# Patient Record
Sex: FEMALE | Race: WHITE | NOT HISPANIC OR LATINO | Employment: OTHER | URBAN - METROPOLITAN AREA
[De-identification: names, ages, dates, MRNs, and addresses within clinical notes are randomized per-mention and may not be internally consistent; named-entity substitution may affect disease eponyms.]

---

## 2017-01-13 PROBLEM — Z72.0 TOBACCO USE: Status: ACTIVE | Noted: 2017-01-13

## 2017-01-13 PROBLEM — I48.91 A-FIB (HCC): Status: ACTIVE | Noted: 2017-01-13

## 2017-01-13 PROBLEM — K52.9 CHRONIC COLITIS: Status: ACTIVE | Noted: 2017-01-13

## 2017-03-30 ENCOUNTER — OFFICE VISIT (OUTPATIENT)
Dept: CARDIOLOGY | Facility: CLINIC | Age: 67
End: 2017-03-30

## 2017-03-30 VITALS
BODY MASS INDEX: 23.49 KG/M2 | HEIGHT: 65 IN | DIASTOLIC BLOOD PRESSURE: 68 MMHG | WEIGHT: 141 LBS | SYSTOLIC BLOOD PRESSURE: 116 MMHG | HEART RATE: 72 BPM

## 2017-03-30 DIAGNOSIS — I48.0 PAROXYSMAL ATRIAL FIBRILLATION (HCC): ICD-10-CM

## 2017-03-30 DIAGNOSIS — Z72.0 TOBACCO USE: Primary | ICD-10-CM

## 2017-03-30 DIAGNOSIS — F10.90 CHRONIC ALCOHOL USE: ICD-10-CM

## 2017-03-30 PROCEDURE — 99213 OFFICE O/P EST LOW 20 MIN: CPT | Performed by: PHYSICIAN ASSISTANT

## 2017-03-30 PROCEDURE — 93000 ELECTROCARDIOGRAM COMPLETE: CPT | Performed by: PHYSICIAN ASSISTANT

## 2017-03-30 RX ORDER — MELOXICAM 15 MG/1
15 TABLET ORAL DAILY
COMMUNITY
End: 2019-10-11

## 2017-03-30 RX ORDER — ERGOCALCIFEROL (VITAMIN D2) 10 MCG
400 TABLET ORAL DAILY
COMMUNITY
End: 2019-10-11

## 2017-03-30 NOTE — PROGRESS NOTES
"     [facility] - Office Note  Mar Joseph         [address] [address]  1950   [phone] (home) [phone] (work)     LOCATION:  [place] office.  Visit Type: Follow Up.    PCP:  Adair Duarte MD    03/30/17   Mar Joseph is a 66 y.o.  female  Self employed.      Chief Complaint: Follow up on AFib.  PROBLEM LIST:  1. Isolated episode of atrial fibrillation converting spontaneously on beta-blocker therapy, September 2013.  2. Fall with right tibia/fibula fracture, status post tibia IM harish, September 2013.  3. History of chronic alcohol use.  4. History of chronic tobacco use.  5. Chronic colitis.      Allergies   Allergen Reactions   • Penicillins Hives   • Sulfa Antibiotics      nausea         Current Outpatient Prescriptions:   •  atenolol (TENORMIN) 100 MG tablet, Take 100 mg by mouth daily., Disp: , Rfl:   •  bimatoprost (LUMIGAN) 0.01 % ophthalmic drops, 1 drop every night., Disp: , Rfl:   •  escitalopram (LEXAPRO) 10 MG tablet, Take 10 mg by mouth daily., Disp: , Rfl:   •  meloxicam (MOBIC) 15 MG tablet, Take 15 mg by mouth Daily., Disp: , Rfl:   •  Vitamin D, Cholecalciferol, (CHOLECALCIFEROL) 400 UNITS tablet, Take 400 Units by mouth Daily., Disp: , Rfl:   •  vitamin E 100 UNIT capsule, Take 100 Units by mouth Daily., Disp: , Rfl:     HPI  Mar is here for routine follow-up on history of remote atrial fibrillation.  From a cardiac standpoint she is done well since her last visit.  Occasionally she will get a \"flutter\" that will last a few seconds.  She's had no early follow-up or ER visits.  She is currently dealing with a sinus infection and is wanting us to clarify what she can and cannot take over-the-counter.  The following portions of the patient's history were reviewed in the chart and updated as appropriate: allergies, current medications, past family history, past medical history, past social history, past surgical history and " "problem list.    Review of Systems   HENT: Positive for congestion.    Cardiovascular: Positive for palpitations. Negative for chest pain, dyspnea on exertion and irregular heartbeat.   Neurological: Negative.    All other systems reviewed and are negative.            height is 65\" (165.1 cm) and weight is 141 lb (64 kg). Her blood pressure is 116/68 and her pulse is 72.   Physical Exam   Constitutional: Vital signs are normal. She appears well-developed and well-nourished.   Cardiovascular: Normal rate, regular rhythm, S1 normal, S2 normal, normal heart sounds, intact distal pulses and normal pulses.    Pulmonary/Chest: Effort normal and breath sounds normal. She has no wheezes. She has no rhonchi. She has no rales.   Abdominal: Soft. Normal appearance and bowel sounds are normal. There is no hepatosplenomegaly.   Neurological: She is alert.           ECG 12 Lead  Date/Time: 3/30/2017 2:03 PM  Performed by: NURIA BUCIO  Authorized by: NURIA BUCIO   Previous ECG: no previous ECG available  Rhythm: sinus rhythm  Rate: normal  QRS axis: normal  Clinical impression: normal ECG             Assessment/ Plan     Tobacco use:  Patient not interested in smoking cessation at this time.    Chronic alcohol use:  Per PCP.    Paroxysmal atrial fibrillation:  No documented recurrences.  EKG today reviewed and within normal limits.  Return to clinic one year or sooner when necessary.    Nuria Bucio PA-C  3/30/2017 2:02 PM      EMR Dragon/Transcription disclaimer:   Much of this encounter note is an electronic transcription/translation of spoken language to printed text. The electronic translation of spoken language may permit erroneous, or at times, nonsensical words or phrases to be inadvertently transcribed; Although I have reviewed the note for such errors, some may still exist.      "

## 2019-10-09 ENCOUNTER — LAB (OUTPATIENT)
Dept: LAB | Facility: HOSPITAL | Age: 69
End: 2019-10-09

## 2019-10-09 ENCOUNTER — OFFICE VISIT (OUTPATIENT)
Dept: CARDIOLOGY | Facility: HOSPITAL | Age: 69
End: 2019-10-09

## 2019-10-09 ENCOUNTER — HOSPITAL ENCOUNTER (OUTPATIENT)
Dept: CARDIOLOGY | Facility: HOSPITAL | Age: 69
Discharge: HOME | End: 2019-10-09
Admitting: NURSE PRACTITIONER

## 2019-10-09 VITALS
HEART RATE: 83 BPM | HEIGHT: 64 IN | BODY MASS INDEX: 22.9 KG/M2 | WEIGHT: 134.13 LBS | RESPIRATION RATE: 18 BRPM | SYSTOLIC BLOOD PRESSURE: 135 MMHG | TEMPERATURE: 97 F | DIASTOLIC BLOOD PRESSURE: 69 MMHG | OXYGEN SATURATION: 99 %

## 2019-10-09 DIAGNOSIS — I48.91 ATRIAL FIBRILLATION, UNSPECIFIED TYPE (HCC): ICD-10-CM

## 2019-10-09 DIAGNOSIS — I48.19 OTHER PERSISTENT ATRIAL FIBRILLATION (HCC): Primary | ICD-10-CM

## 2019-10-09 DIAGNOSIS — I48.19 OTHER PERSISTENT ATRIAL FIBRILLATION (HCC): ICD-10-CM

## 2019-10-09 LAB
ALBUMIN SERPL-MCNC: 4.6 G/DL (ref 3.5–5.2)
ALBUMIN/GLOB SERPL: 1.5 G/DL
ALP SERPL-CCNC: 57 U/L (ref 39–117)
ALT SERPL W P-5'-P-CCNC: 11 U/L (ref 1–33)
ANION GAP SERPL CALCULATED.3IONS-SCNC: 11 MMOL/L (ref 5–15)
AST SERPL-CCNC: 17 U/L (ref 1–32)
BASOPHILS # BLD AUTO: 0.05 10*3/MM3 (ref 0–0.2)
BASOPHILS NFR BLD AUTO: 0.6 % (ref 0–1.5)
BILIRUB SERPL-MCNC: 0.4 MG/DL (ref 0.2–1.2)
BUN BLD-MCNC: 13 MG/DL (ref 8–23)
BUN/CREAT SERPL: 26 (ref 7–25)
CALCIUM SPEC-SCNC: 10 MG/DL (ref 8.6–10.5)
CHLORIDE SERPL-SCNC: 98 MMOL/L (ref 98–107)
CO2 SERPL-SCNC: 27 MMOL/L (ref 22–29)
CREAT BLD-MCNC: 0.5 MG/DL (ref 0.57–1)
DEPRECATED RDW RBC AUTO: 43.7 FL (ref 37–54)
EOSINOPHIL # BLD AUTO: 0.1 10*3/MM3 (ref 0–0.4)
EOSINOPHIL NFR BLD AUTO: 1.1 % (ref 0.3–6.2)
ERYTHROCYTE [DISTWIDTH] IN BLOOD BY AUTOMATED COUNT: 12 % (ref 12.3–15.4)
GFR SERPL CREATININE-BSD FRML MDRD: 123 ML/MIN/1.73
GLOBULIN UR ELPH-MCNC: 3 GM/DL
GLUCOSE BLD-MCNC: 105 MG/DL (ref 65–99)
HCT VFR BLD AUTO: 39.7 % (ref 34–46.6)
HGB BLD-MCNC: 13.5 G/DL (ref 12–15.9)
IMM GRANULOCYTES # BLD AUTO: 0.06 10*3/MM3 (ref 0–0.05)
IMM GRANULOCYTES NFR BLD AUTO: 0.7 % (ref 0–0.5)
LYMPHOCYTES # BLD AUTO: 1.51 10*3/MM3 (ref 0.7–3.1)
LYMPHOCYTES NFR BLD AUTO: 16.6 % (ref 19.6–45.3)
MAGNESIUM SERPL-MCNC: 2.1 MG/DL (ref 1.6–2.4)
MCH RBC QN AUTO: 33.5 PG (ref 26.6–33)
MCHC RBC AUTO-ENTMCNC: 34 G/DL (ref 31.5–35.7)
MCV RBC AUTO: 98.5 FL (ref 79–97)
MONOCYTES # BLD AUTO: 0.82 10*3/MM3 (ref 0.1–0.9)
MONOCYTES NFR BLD AUTO: 9 % (ref 5–12)
NEUTROPHILS # BLD AUTO: 6.54 10*3/MM3 (ref 1.7–7)
NEUTROPHILS NFR BLD AUTO: 72 % (ref 42.7–76)
NRBC BLD AUTO-RTO: 0 /100 WBC (ref 0–0.2)
PLATELET # BLD AUTO: 259 10*3/MM3 (ref 140–450)
PMV BLD AUTO: 12.4 FL (ref 6–12)
POTASSIUM BLD-SCNC: 4.3 MMOL/L (ref 3.5–5.2)
PROT SERPL-MCNC: 7.6 G/DL (ref 6–8.5)
RBC # BLD AUTO: 4.03 10*6/MM3 (ref 3.77–5.28)
SODIUM BLD-SCNC: 136 MMOL/L (ref 136–145)
T-UPTAKE NFR SERPL: 0.99 TBI (ref 0.8–1.3)
T4 SERPL-MCNC: 5.75 MCG/DL (ref 4.5–11.7)
TSH SERPL DL<=0.05 MIU/L-ACNC: 1.55 UIU/ML (ref 0.27–4.2)
WBC NRBC COR # BLD: 9.08 10*3/MM3 (ref 3.4–10.8)

## 2019-10-09 PROCEDURE — 85025 COMPLETE CBC W/AUTO DIFF WBC: CPT

## 2019-10-09 PROCEDURE — 36415 COLL VENOUS BLD VENIPUNCTURE: CPT

## 2019-10-09 PROCEDURE — 84436 ASSAY OF TOTAL THYROXINE: CPT

## 2019-10-09 PROCEDURE — 99214 OFFICE O/P EST MOD 30 MIN: CPT | Performed by: NURSE PRACTITIONER

## 2019-10-09 PROCEDURE — 80053 COMPREHEN METABOLIC PANEL: CPT

## 2019-10-09 PROCEDURE — 83735 ASSAY OF MAGNESIUM: CPT

## 2019-10-09 PROCEDURE — 84443 ASSAY THYROID STIM HORMONE: CPT

## 2019-10-09 PROCEDURE — 84479 ASSAY OF THYROID (T3 OR T4): CPT

## 2019-10-09 RX ORDER — TRAMADOL HYDROCHLORIDE 50 MG/1
1 TABLET ORAL EVERY 12 HOURS SCHEDULED
COMMUNITY
End: 2019-11-25

## 2019-10-09 RX ORDER — APIXABAN 5 MG/1
1 TABLET, FILM COATED ORAL 2 TIMES DAILY
Refills: 0 | Status: ON HOLD | COMMUNITY
Start: 2019-10-08 | End: 2019-10-16 | Stop reason: SDUPTHER

## 2019-10-09 RX ORDER — IBUPROFEN 200 MG
1 TABLET ORAL EVERY 6 HOURS
COMMUNITY
End: 2019-10-11

## 2019-10-09 NOTE — PROGRESS NOTES
[facility]  [facility]      Encounter Date:10/09/2019     Mar Joseph  [address] [address]  [unfilled]    1950    Adair Duarte MD    Mar Joseph is a 68 y.o. female.      Subjective:     Chief Complaint:  Atrial Fibrillation       HPI   Patient is a 68-year-old female with past medical history significant for isolated episode of atrial fibrillation in 2013 who presents to the [facility] as an add-on visit for atrial fibrillation.  2 weeks ago she started having significant fatigue, which was unusual for her.  She normally goes to the gym on a daily basis and reports on Friday she did her routine aerobic exercise and her heart rate Jumping up to 135 according to her apple watch.  No shortness of breath, lightheadedness or presyncope, chest pain.  She went to urgent treatment center and was started on Eliquis and her atenolol was increased from 25 mg daily to 50 mg twice a day.  She has had some improvement in her symptoms but still has some fatigue.  She continues to exercise regularly without bothersome symptoms.  She has no history of CAD, hypertension, DM or CHF.  No history of sleep apnea or sleep disturbances.  She does drink 2 light beers a night.  She recently changed her 1 cup of coffee a day to decaf.    Patient Active Problem List   Diagnosis   • A-fib (CMS/HCC)   • Chronic alcohol use   • Tobacco use   • Chronic colitis   • Palpitation       Past Medical History:   Diagnosis Date   • A-fib (CMS/HCC)    • Chronic alcohol use    • Chronic colitis    • Palpitation     Benign palpitations   • Tibia/fibula fracture     Fall with right tibia/fibula fracture, status post tibia IM harish, September 2013.   • Tobacco use        History reviewed. No pertinent surgical history.    Family History   Problem Relation Age of Onset   • Asthma Mother    • No Known Problems Father    • Heart disease Sister    • Cancer Brother    • No Known Problems  Maternal Grandmother    • No Known Problems Maternal Grandfather    • No Known Problems Paternal Grandmother    • No Known Problems Paternal Grandfather    • No Known Problems Brother        Social History     Socioeconomic History   • Marital status:      Spouse name: Not on file   • Number of children: Not on file   • Years of education: Not on file   • Highest education level: Not on file   Tobacco Use   • Smoking status: Current Every Day Smoker     Packs/day: 1.00     Types: Cigarettes   • Smokeless tobacco: Never Used   Substance and Sexual Activity   • Alcohol use: Yes     Frequency: Monthly or less     Drinks per session: 1 or 2     Comment: occasional   • Drug use: No   • Sexual activity: Defer   Social History Narrative    Caffeine: 1 decaf recently       Allergies   Allergen Reactions   • Penicillins Hives   • Sulfa Antibiotics      nausea         Current Outpatient Medications:   •  atenolol (TENORMIN) 25 MG tablet, Take 50 mg by mouth 2 (Two) Times a Day., Disp: , Rfl:   •  bimatoprost (LUMIGAN) 0.01 % ophthalmic drops, 1 drop every night., Disp: , Rfl:   •  ELIQUIS 5 MG tablet tablet, Take 1 tablet by mouth 2 (Two) Times a Day., Disp: , Rfl: 0  •  escitalopram (LEXAPRO) 10 MG tablet, Take 10 mg by mouth daily., Disp: , Rfl:   •  ibuprofen (ADVIL,MOTRIN) 200 MG tablet, Take 1 tablet by mouth Every 6 (Six) Hours., Disp: , Rfl:   •  meloxicam (MOBIC) 15 MG tablet, Take 15 mg by mouth Daily., Disp: , Rfl:   •  traMADol (ULTRAM) 50 MG tablet, Take 1 tablet by mouth Every 12 (Twelve) Hours., Disp: , Rfl:   •  Vitamin D, Cholecalciferol, (CHOLECALCIFEROL) 400 UNITS tablet, Take 400 Units by mouth Daily., Disp: , Rfl:   •  vitamin E 100 UNIT capsule, Take 100 Units by mouth Daily., Disp: , Rfl:     The following portions of the patient's history were reviewed today and updated as appropriate: allergies, current medications, past family history, past medical history, past social history, past surgical  "history and problem list     Review of Systems   Constitution: Positive for malaise/fatigue. Negative for chills and fever.   HENT: Negative.    Eyes: Negative.    Cardiovascular: Positive for irregular heartbeat. Negative for chest pain, claudication, cyanosis, dyspnea on exertion, leg swelling, near-syncope, orthopnea, palpitations, paroxysmal nocturnal dyspnea and syncope.   Respiratory: Negative for cough, shortness of breath and snoring.    Endocrine: Negative.    Hematologic/Lymphatic: Does not bruise/bleed easily.   Skin: Negative for poor wound healing.   Musculoskeletal: Positive for back pain.   Gastrointestinal: Negative for abdominal pain, heartburn, hematemesis, melena, nausea and vomiting.   Genitourinary: Negative.  Negative for hematuria.   Neurological: Negative.    Psychiatric/Behavioral: Negative.    Allergic/Immunologic: Negative.        Objective:     Vitals:    10/09/19 1242   BP: 135/69   BP Location: Right arm   Patient Position: Sitting   Cuff Size: Adult   Pulse: 83   Resp: 18   Temp: 97 °F (36.1 °C)   TempSrc: Temporal   SpO2: 99%   Weight: 60.8 kg (134 lb 2 oz)   Height: 162.6 cm (64\")       Physical Exam   Constitutional: She is oriented to person, place, and time. She appears well-developed and well-nourished. No distress.   HENT:   Head: Normocephalic.   Eyes: Conjunctivae are normal. Pupils are equal, round, and reactive to light.   Neck: Neck supple. No JVD present. No thyromegaly present.   Cardiovascular: Normal rate, normal heart sounds and intact distal pulses. An irregularly irregular rhythm present. Exam reveals no gallop and no friction rub.   No murmur heard.  Pulmonary/Chest: Effort normal and breath sounds normal. No respiratory distress. She has no wheezes. She has no rales. She exhibits no tenderness.   Abdominal: Soft. Bowel sounds are normal.   Musculoskeletal: Normal range of motion. She exhibits no edema.   Neurological: She is alert and oriented to person, place, and " time.   Skin: Skin is warm and dry.   Psychiatric: She has a normal mood and affect. Her behavior is normal. Thought content normal.   Vitals reviewed.      Lab and Diagnostic Review:    EKG 10/8/2019 showed atrial fibrillation with normal ventricular response, heart rate 97, QRS 90, QTc 434 ms    Assessment and Plan:   1. Other persistent atrial fibrillation  Atrial fibrillation education provided today including: causes of afib, s/s, risks for stroke and use of anticoagulation for stroke prevention and treatment of atrial fibrillation. Rhythm vs rate control discussed as well as medication management.  CHADS-VASc Risk Assessment            2       Total Score        1 Age 65-74    1 Sex: Female        Criteria that do not apply:    CHF    Hypertension    Age >/= 75    DM    PRIOR STROKE/TIA/THROMBO    Vascular Disease        Continue eliquis 5mg PO BID  Avoid NSAIDs  Decrease ETOH, preferably avoid  Keep appt with Dr. Lafleur. Will need TTE if YOLANDA/ECV not done. At this time she is asymptomatic so no need for urgent ECV    - Thyroid Panel With TSH; Future  - Comprehensive Metabolic Panel; Future  - CBC & Differential; Future  - Magnesium; Future    RV PRN    It has been a pleasure to participate in the care of this patient.  Patient was instructed to call the [facility] with any questions, concerns, or worsening symptoms.    *Please note that portions of this note were completed with a voice recognition program. Efforts were made to edit the dictations, but occasionally words are mistranscribed.

## 2019-10-11 ENCOUNTER — PREP FOR SURGERY (OUTPATIENT)
Dept: OTHER | Facility: HOSPITAL | Age: 69
End: 2019-10-11

## 2019-10-11 ENCOUNTER — OFFICE VISIT (OUTPATIENT)
Dept: CARDIOLOGY | Facility: CLINIC | Age: 69
End: 2019-10-11

## 2019-10-11 VITALS
OXYGEN SATURATION: 99 % | SYSTOLIC BLOOD PRESSURE: 110 MMHG | HEART RATE: 73 BPM | WEIGHT: 132 LBS | BODY MASS INDEX: 22.53 KG/M2 | DIASTOLIC BLOOD PRESSURE: 60 MMHG | HEIGHT: 64 IN

## 2019-10-11 DIAGNOSIS — Z72.0 TOBACCO USE: ICD-10-CM

## 2019-10-11 DIAGNOSIS — I48.0 PAROXYSMAL ATRIAL FIBRILLATION (HCC): Primary | ICD-10-CM

## 2019-10-11 DIAGNOSIS — E78.2 MIXED HYPERLIPIDEMIA: ICD-10-CM

## 2019-10-11 PROBLEM — E78.1 PURE HYPERTRIGLYCERIDEMIA: Status: ACTIVE | Noted: 2019-10-11

## 2019-10-11 PROCEDURE — 99214 OFFICE O/P EST MOD 30 MIN: CPT | Performed by: INTERNAL MEDICINE

## 2019-10-11 PROCEDURE — 93000 ELECTROCARDIOGRAM COMPLETE: CPT | Performed by: INTERNAL MEDICINE

## 2019-10-11 RX ORDER — SODIUM CHLORIDE 0.9 % (FLUSH) 0.9 %
10 SYRINGE (ML) INJECTION AS NEEDED
Status: CANCELLED | OUTPATIENT
Start: 2019-10-11

## 2019-10-11 RX ORDER — ONDANSETRON 2 MG/ML
4 INJECTION INTRAMUSCULAR; INTRAVENOUS EVERY 6 HOURS PRN
Status: CANCELLED | OUTPATIENT
Start: 2019-10-11

## 2019-10-11 RX ORDER — SODIUM CHLORIDE 0.9 % (FLUSH) 0.9 %
3 SYRINGE (ML) INJECTION EVERY 12 HOURS SCHEDULED
Status: CANCELLED | OUTPATIENT
Start: 2019-10-11

## 2019-10-11 RX ORDER — NITROGLYCERIN 0.4 MG/1
0.4 TABLET SUBLINGUAL
Status: CANCELLED | OUTPATIENT
Start: 2019-10-11

## 2019-10-11 NOTE — PROGRESS NOTES
Subjective   Mar Joseph is a 68 y.o. female.  Primary Care: Adair Duarte MD  Referring: Adair Duarte MD  [address] [place] [address]      Chief Complaint   Patient presents with   • Atrial Fibrillation       Patient Active Problem List    Diagnosis Date Noted   • Atrial fibrillation (CMS/HCC) 01/13/2017     1. Isolated episode of atrial fibrillation converting spontaneously on beta-blocker therapy, September 2013.  2. Recurrent persistent atrial fibrillation.  3. CHADVASC 2   • Palpitation    • Mixed hyperlipidemia    • Tobacco use    • Chronic colitis 01/13/2017      History of Present Illness   This is a 68-year-old dyslipidemic female smoker with a prior history of paroxysmal atrial fibrillation with no known recurrence since 2013.  She recently presented to primary care with a 2 to 3-week complaint of awareness of tachypalpitations.  She checks her rhythm on her iWatch which shows atrial fibrillation.  She does not check her blood pressure regularly.  She denies associated chest pain dizziness or syncope.  She continues to exercise regularly 1-1/2 hours 2 times per week.  She is originally presented to an urgent treatment center where she was prescribed Eliquis 5 mg twice daily.  She is concerned about her cup coming spinal steroid injection.  Her thyroid function is within normal limits.  She denies orthopnea, PND, claudication, lower extremity edema.  She denies dizziness or syncope.    History reviewed. No pertinent surgical history.    The following portions of the patient's history were reviewed and updated as appropriate: allergies, current medications, past family history, past medical history, past social history, past surgical history and problem list.    Allergies   Allergen Reactions   • Penicillins Hives   • Sulfa Antibiotics      nausea         Current Outpatient Medications:   •  atenolol (TENORMIN) 100 MG tablet, Take 50 mg by mouth 2 (Two) Times a Day., Disp: , Rfl:   •   bimatoprost (LUMIGAN) 0.01 % ophthalmic drops, 1 drop every night., Disp: , Rfl:   •  ELIQUIS 5 MG tablet tablet, Take 1 tablet by mouth 2 (Two) Times a Day., Disp: , Rfl: 0  •  escitalopram (LEXAPRO) 10 MG tablet, Take 10 mg by mouth daily., Disp: , Rfl:   •  traMADol (ULTRAM) 50 MG tablet, Take 1 tablet by mouth Every 12 (Twelve) Hours., Disp: , Rfl:     Review of Systems   Constitution: Positive for malaise/fatigue.   HENT: Negative.    Eyes: Positive for vision loss in left eye and vision loss in right eye.   Cardiovascular: Positive for palpitations.   Respiratory: Negative.    Endocrine: Negative.    Hematologic/Lymphatic: Negative.    Skin: Negative.    Musculoskeletal: Positive for arthritis.   Gastrointestinal: Negative.    Genitourinary: Negative.    Neurological: Negative.    Psychiatric/Behavioral: Negative.    Allergic/Immunologic: Negative.    All other systems reviewed and are negative.      Social History     Socioeconomic History   • Marital status:      Spouse name: Not on file   • Number of children: Not on file   • Years of education: Not on file   • Highest education level: Not on file   Tobacco Use   • Smoking status: Current Every Day Smoker     Packs/day: 0.75     Types: Cigarettes   • Smokeless tobacco: Never Used   Substance and Sexual Activity   • Alcohol use: Yes     Frequency: Monthly or less     Drinks per session: 1 or 2     Comment: occasional   • Drug use: No   • Sexual activity: Defer   Social History Narrative    Caffeine: 1 decaf recently       Family History   Problem Relation Age of Onset   • Asthma Mother    • No Known Problems Father    • Heart disease Sister    • Cancer Brother    • No Known Problems Maternal Grandmother    • No Known Problems Maternal Grandfather    • No Known Problems Paternal Grandmother    • No Known Problems Paternal Grandfather    • No Known Problems Brother        Objective      /60 (BP Location: Left arm, Patient Position: Sitting)    "Pulse 73   Ht 162.6 cm (64\")   Wt 59.9 kg (132 lb)   SpO2 99%   BMI 22.66 kg/m²     Physical Exam   Constitutional: She is oriented to person, place, and time. She appears well-developed and well-nourished. No distress.   HENT:   Head: Normocephalic and atraumatic.   Mouth/Throat: Oropharynx is clear and moist.   Eyes: Pupils are equal, round, and reactive to light. No scleral icterus.   Neck: Neck supple. No JVD present. No tracheal deviation present. No thyromegaly present.   Cardiovascular: Normal rate and normal heart sounds. An irregular rhythm present. Exam reveals no gallop and no friction rub.   No murmur heard.  Pulmonary/Chest: Effort normal and breath sounds normal. No respiratory distress. She has no wheezes. She has no rales.   Abdominal: Soft. Bowel sounds are normal. She exhibits no distension and no mass. There is no tenderness. There is no rebound and no guarding.   Musculoskeletal: Normal range of motion. She exhibits no edema or deformity.   Lymphadenopathy:     She has no cervical adenopathy.   Neurological: She is alert and oriented to person, place, and time. No cranial nerve deficit.   Skin: Skin is warm and dry. No rash noted. She is not diaphoretic.   Psychiatric: She has a normal mood and affect.   Nursing note and vitals reviewed.        ECG 12 Lead  Date/Time: 10/11/2019 8:26 AM  Performed by: Marcelo Lafleur MD  Authorized by: Marcelo Lafleur MD   Previous ECG: no previous ECG available  Rhythm: sinus rhythm and atrial fibrillation  Rate: normal  BPM: 78  Conduction: conduction normal  ST Segments: ST segments normal  T Waves: T waves normal  QRS axis: normal  Other: no other findings    Clinical impression: abnormal EKG                Lab Review:   Lab Results   Component Value Date    GLUCOSE 105 (H) 10/09/2019    BUN 13 10/09/2019    CREATININE 0.50 (L) 10/09/2019    EGFRIFNONA 123 10/09/2019    BCR 26.0 (H) 10/09/2019    CO2 27.0 10/09/2019    CALCIUM 10.0 10/09/2019    ALBUMIN 4.60 " 10/09/2019    AST 17 10/09/2019    ALT 11 10/09/2019       Lab Results   Component Value Date    WBC 9.08 10/09/2019    HGB 13.5 10/09/2019    HCT 39.7 10/09/2019    MCV 98.5 (H) 10/09/2019     10/09/2019           Lab Results   Component Value Date    TSH 1.550 10/09/2019         CHADS-VASc Risk Assessment            2       Total Score        1 Age 65-74    1 Sex: Female        Criteria that do not apply:    CHF    Hypertension    Age >/= 75    DM    PRIOR STROKE/TIA/THROMBO    Vascular Disease        Assessment:   Diagnosis Plan   1.  Persistent atrial fibrillation (CMS/HCC), ongoing awareness of abnormal rhythm despite controlled rate. ECG 12 Lead    Cardioversion External in Cardiology Department    Adult Transesophageal Echo (YOLANDA) W/ Cont if Necessary Per Protocol   2. Mixed hyperlipidemia   defer to primary care   3. Tobacco use   smoking cessation recommended     Plan:  Continue current medications.  Transesophageal echocardiogram followed by ECV if appropriate.  Subsequently we will consider changing her to sotalol.  Follow-up after the procedure.  Thank you for allowing us to participate in the care of your patient.     Scribed for Marcelo Lafleur MD by Electronically signed by Electronically signed by RONAK Walsh, 10/11/19, 10:55 AM.    I, Marcelo Lafleur MD, personally performed the services described in this documentation as scribed by the above named individual in my presence, and it is both accurate and complete.  10/11/2019  11:16 AM

## 2019-10-11 NOTE — H&P (VIEW-ONLY)
Subjective   Mar Joseph is a 68 y.o. female.  Primary Care: Adair Duarte MD  Referring: Adair Duarte MD  [address] [place] [address]      Chief Complaint   Patient presents with   • Atrial Fibrillation       Patient Active Problem List    Diagnosis Date Noted   • Atrial fibrillation (CMS/HCC) 01/13/2017     1. Isolated episode of atrial fibrillation converting spontaneously on beta-blocker therapy, September 2013.  2. Recurrent persistent atrial fibrillation.  3. CHADVASC 2   • Palpitation    • Mixed hyperlipidemia    • Tobacco use    • Chronic colitis 01/13/2017      History of Present Illness   This is a 68-year-old dyslipidemic female smoker with a prior history of paroxysmal atrial fibrillation with no known recurrence since 2013.  She recently presented to primary care with a 2 to 3-week complaint of awareness of tachypalpitations.  She checks her rhythm on her iWatch which shows atrial fibrillation.  She does not check her blood pressure regularly.  She denies associated chest pain dizziness or syncope.  She continues to exercise regularly 1-1/2 hours 2 times per week.  She is originally presented to an urgent treatment center where she was prescribed Eliquis 5 mg twice daily.  She is concerned about her cup coming spinal steroid injection.  Her thyroid function is within normal limits.  She denies orthopnea, PND, claudication, lower extremity edema.  She denies dizziness or syncope.    History reviewed. No pertinent surgical history.    The following portions of the patient's history were reviewed and updated as appropriate: allergies, current medications, past family history, past medical history, past social history, past surgical history and problem list.    Allergies   Allergen Reactions   • Penicillins Hives   • Sulfa Antibiotics      nausea         Current Outpatient Medications:   •  atenolol (TENORMIN) 100 MG tablet, Take 50 mg by mouth 2 (Two) Times a Day., Disp: , Rfl:   •   bimatoprost (LUMIGAN) 0.01 % ophthalmic drops, 1 drop every night., Disp: , Rfl:   •  ELIQUIS 5 MG tablet tablet, Take 1 tablet by mouth 2 (Two) Times a Day., Disp: , Rfl: 0  •  escitalopram (LEXAPRO) 10 MG tablet, Take 10 mg by mouth daily., Disp: , Rfl:   •  traMADol (ULTRAM) 50 MG tablet, Take 1 tablet by mouth Every 12 (Twelve) Hours., Disp: , Rfl:     Review of Systems   Constitution: Positive for malaise/fatigue.   HENT: Negative.    Eyes: Positive for vision loss in left eye and vision loss in right eye.   Cardiovascular: Positive for palpitations.   Respiratory: Negative.    Endocrine: Negative.    Hematologic/Lymphatic: Negative.    Skin: Negative.    Musculoskeletal: Positive for arthritis.   Gastrointestinal: Negative.    Genitourinary: Negative.    Neurological: Negative.    Psychiatric/Behavioral: Negative.    Allergic/Immunologic: Negative.    All other systems reviewed and are negative.      Social History     Socioeconomic History   • Marital status:      Spouse name: Not on file   • Number of children: Not on file   • Years of education: Not on file   • Highest education level: Not on file   Tobacco Use   • Smoking status: Current Every Day Smoker     Packs/day: 0.75     Types: Cigarettes   • Smokeless tobacco: Never Used   Substance and Sexual Activity   • Alcohol use: Yes     Frequency: Monthly or less     Drinks per session: 1 or 2     Comment: occasional   • Drug use: No   • Sexual activity: Defer   Social History Narrative    Caffeine: 1 decaf recently       Family History   Problem Relation Age of Onset   • Asthma Mother    • No Known Problems Father    • Heart disease Sister    • Cancer Brother    • No Known Problems Maternal Grandmother    • No Known Problems Maternal Grandfather    • No Known Problems Paternal Grandmother    • No Known Problems Paternal Grandfather    • No Known Problems Brother        Objective      /60 (BP Location: Left arm, Patient Position: Sitting)    "Pulse 73   Ht 162.6 cm (64\")   Wt 59.9 kg (132 lb)   SpO2 99%   BMI 22.66 kg/m²     Physical Exam   Constitutional: She is oriented to person, place, and time. She appears well-developed and well-nourished. No distress.   HENT:   Head: Normocephalic and atraumatic.   Mouth/Throat: Oropharynx is clear and moist.   Eyes: Pupils are equal, round, and reactive to light. No scleral icterus.   Neck: Neck supple. No JVD present. No tracheal deviation present. No thyromegaly present.   Cardiovascular: Normal rate and normal heart sounds. An irregular rhythm present. Exam reveals no gallop and no friction rub.   No murmur heard.  Pulmonary/Chest: Effort normal and breath sounds normal. No respiratory distress. She has no wheezes. She has no rales.   Abdominal: Soft. Bowel sounds are normal. She exhibits no distension and no mass. There is no tenderness. There is no rebound and no guarding.   Musculoskeletal: Normal range of motion. She exhibits no edema or deformity.   Lymphadenopathy:     She has no cervical adenopathy.   Neurological: She is alert and oriented to person, place, and time. No cranial nerve deficit.   Skin: Skin is warm and dry. No rash noted. She is not diaphoretic.   Psychiatric: She has a normal mood and affect.   Nursing note and vitals reviewed.        ECG 12 Lead  Date/Time: 10/11/2019 8:26 AM  Performed by: Marcelo Lafleur MD  Authorized by: Marcelo Lafleur MD   Previous ECG: no previous ECG available  Rhythm: sinus rhythm and atrial fibrillation  Rate: normal  BPM: 78  Conduction: conduction normal  ST Segments: ST segments normal  T Waves: T waves normal  QRS axis: normal  Other: no other findings    Clinical impression: abnormal EKG                Lab Review:   Lab Results   Component Value Date    GLUCOSE 105 (H) 10/09/2019    BUN 13 10/09/2019    CREATININE 0.50 (L) 10/09/2019    EGFRIFNONA 123 10/09/2019    BCR 26.0 (H) 10/09/2019    CO2 27.0 10/09/2019    CALCIUM 10.0 10/09/2019    ALBUMIN 4.60 " 10/09/2019    AST 17 10/09/2019    ALT 11 10/09/2019       Lab Results   Component Value Date    WBC 9.08 10/09/2019    HGB 13.5 10/09/2019    HCT 39.7 10/09/2019    MCV 98.5 (H) 10/09/2019     10/09/2019           Lab Results   Component Value Date    TSH 1.550 10/09/2019         CHADS-VASc Risk Assessment            2       Total Score        1 Age 65-74    1 Sex: Female        Criteria that do not apply:    CHF    Hypertension    Age >/= 75    DM    PRIOR STROKE/TIA/THROMBO    Vascular Disease        Assessment:   Diagnosis Plan   1.  Persistent atrial fibrillation (CMS/HCC), ongoing awareness of abnormal rhythm despite controlled rate. ECG 12 Lead    Cardioversion External in Cardiology Department    Adult Transesophageal Echo (YOLANDA) W/ Cont if Necessary Per Protocol   2. Mixed hyperlipidemia   defer to primary care   3. Tobacco use   smoking cessation recommended     Plan:  Continue current medications.  Transesophageal echocardiogram followed by ECV if appropriate.  Subsequently we will consider changing her to sotalol.  Follow-up after the procedure.  Thank you for allowing us to participate in the care of your patient.     Scribed for Marcelo Lafleur MD by Electronically signed by Electronically signed by RONAK Walsh, 10/11/19, 10:55 AM.    I, Marcelo Lafleur MD, personally performed the services described in this documentation as scribed by the above named individual in my presence, and it is both accurate and complete.  10/11/2019  11:16 AM

## 2019-10-16 ENCOUNTER — HOSPITAL ENCOUNTER (OUTPATIENT)
Dept: CARDIOLOGY | Facility: HOSPITAL | Age: 69
Discharge: HOME | End: 2019-10-16
Attending: INTERNAL MEDICINE | Admitting: INTERNAL MEDICINE

## 2019-10-16 VITALS
HEIGHT: 64 IN | TEMPERATURE: 97.3 F | OXYGEN SATURATION: 95 % | RESPIRATION RATE: 16 BRPM | SYSTOLIC BLOOD PRESSURE: 140 MMHG | BODY MASS INDEX: 22.36 KG/M2 | DIASTOLIC BLOOD PRESSURE: 82 MMHG | HEART RATE: 65 BPM | WEIGHT: 131 LBS

## 2019-10-16 DIAGNOSIS — I48.0 PAROXYSMAL ATRIAL FIBRILLATION (HCC): ICD-10-CM

## 2019-10-16 LAB
ANION GAP SERPL CALCULATED.3IONS-SCNC: 12 MMOL/L (ref 5–15)
BH CV VAS BP LEFT ARM: NORMAL MMHG
BUN BLD-MCNC: 8 MG/DL (ref 8–23)
BUN/CREAT SERPL: 18.6 (ref 7–25)
CALCIUM SPEC-SCNC: 9.5 MG/DL (ref 8.6–10.5)
CHLORIDE SERPL-SCNC: 102 MMOL/L (ref 98–107)
CO2 SERPL-SCNC: 24 MMOL/L (ref 22–29)
CREAT BLD-MCNC: 0.43 MG/DL (ref 0.57–1)
DEPRECATED RDW RBC AUTO: 44.1 FL (ref 37–54)
ERYTHROCYTE [DISTWIDTH] IN BLOOD BY AUTOMATED COUNT: 12.1 % (ref 12.3–15.4)
GFR SERPL CREATININE-BSD FRML MDRD: 146 ML/MIN/1.73
GLUCOSE BLD-MCNC: 92 MG/DL (ref 65–99)
HCT VFR BLD AUTO: 37.4 % (ref 34–46.6)
HGB BLD-MCNC: 12.5 G/DL (ref 12–15.9)
LV EF 2D ECHO EST: 60 %
MAGNESIUM SERPL-MCNC: 1.9 MG/DL (ref 1.6–2.4)
MCH RBC QN AUTO: 32.8 PG (ref 26.6–33)
MCHC RBC AUTO-ENTMCNC: 33.4 G/DL (ref 31.5–35.7)
MCV RBC AUTO: 98.2 FL (ref 79–97)
PLATELET # BLD AUTO: 225 10*3/MM3 (ref 140–450)
PMV BLD AUTO: 10.6 FL (ref 6–12)
POTASSIUM BLD-SCNC: 4.5 MMOL/L (ref 3.5–5.2)
RBC # BLD AUTO: 3.81 10*6/MM3 (ref 3.77–5.28)
SODIUM BLD-SCNC: 138 MMOL/L (ref 136–145)
WBC NRBC COR # BLD: 6.85 10*3/MM3 (ref 3.4–10.8)

## 2019-10-16 PROCEDURE — 36415 COLL VENOUS BLD VENIPUNCTURE: CPT

## 2019-10-16 PROCEDURE — 93321 DOPPLER ECHO F-UP/LMTD STD: CPT | Performed by: INTERNAL MEDICINE

## 2019-10-16 PROCEDURE — 93312 ECHO TRANSESOPHAGEAL: CPT | Performed by: INTERNAL MEDICINE

## 2019-10-16 PROCEDURE — 25010000002 MIDAZOLAM PER 1 MG: Performed by: INTERNAL MEDICINE

## 2019-10-16 PROCEDURE — 93005 ELECTROCARDIOGRAM TRACING: CPT | Performed by: INTERNAL MEDICINE

## 2019-10-16 PROCEDURE — 92960 CARDIOVERSION ELECTRIC EXT: CPT | Performed by: INTERNAL MEDICINE

## 2019-10-16 PROCEDURE — 92960 CARDIOVERSION ELECTRIC EXT: CPT

## 2019-10-16 PROCEDURE — 85027 COMPLETE CBC AUTOMATED: CPT

## 2019-10-16 PROCEDURE — 80048 BASIC METABOLIC PNL TOTAL CA: CPT

## 2019-10-16 PROCEDURE — 93321 DOPPLER ECHO F-UP/LMTD STD: CPT

## 2019-10-16 PROCEDURE — 83735 ASSAY OF MAGNESIUM: CPT

## 2019-10-16 PROCEDURE — 93325 DOPPLER ECHO COLOR FLOW MAPG: CPT

## 2019-10-16 PROCEDURE — 93312 ECHO TRANSESOPHAGEAL: CPT

## 2019-10-16 PROCEDURE — 93325 DOPPLER ECHO COLOR FLOW MAPG: CPT | Performed by: INTERNAL MEDICINE

## 2019-10-16 RX ORDER — FLUMAZENIL 0.1 MG/ML
INJECTION INTRAVENOUS
Status: DISCONTINUED
Start: 2019-10-16 | End: 2019-10-16 | Stop reason: WASHOUT

## 2019-10-16 RX ORDER — SOTALOL HYDROCHLORIDE 80 MG/1
80 TABLET ORAL EVERY 12 HOURS SCHEDULED
Status: DISCONTINUED | OUTPATIENT
Start: 2019-10-16 | End: 2019-10-16 | Stop reason: HOSPADM

## 2019-10-16 RX ORDER — MIDAZOLAM HYDROCHLORIDE 1 MG/ML
INJECTION INTRAMUSCULAR; INTRAVENOUS
Status: DISCONTINUED
Start: 2019-10-16 | End: 2019-10-16 | Stop reason: HOSPADM

## 2019-10-16 RX ORDER — FENTANYL CITRATE 50 UG/ML
INJECTION, SOLUTION INTRAMUSCULAR; INTRAVENOUS
Status: DISCONTINUED
Start: 2019-10-16 | End: 2019-10-16 | Stop reason: WASHOUT

## 2019-10-16 RX ORDER — NALOXONE HYDROCHLORIDE 0.4 MG/ML
INJECTION, SOLUTION INTRAMUSCULAR; INTRAVENOUS; SUBCUTANEOUS
Status: DISCONTINUED
Start: 2019-10-16 | End: 2019-10-16 | Stop reason: WASHOUT

## 2019-10-16 RX ORDER — MIDAZOLAM HYDROCHLORIDE 1 MG/ML
INJECTION INTRAMUSCULAR; INTRAVENOUS
Status: DISCONTINUED | OUTPATIENT
Start: 2019-10-16 | End: 2019-10-16 | Stop reason: HOSPADM

## 2019-10-16 RX ORDER — SOTALOL HYDROCHLORIDE 80 MG/1
80 TABLET ORAL EVERY 12 HOURS SCHEDULED
Qty: 60 TABLET | Refills: 3 | Status: SHIPPED | OUTPATIENT
Start: 2019-10-16 | End: 2019-10-16

## 2019-10-16 RX ORDER — SOTALOL HYDROCHLORIDE 80 MG/1
80 TABLET ORAL EVERY 12 HOURS SCHEDULED
Qty: 60 TABLET | Refills: 3 | Status: SHIPPED | OUTPATIENT
Start: 2019-10-16 | End: 2019-10-17

## 2019-10-16 RX ADMIN — SOTALOL HYDROCHLORIDE 80 MG: 80 TABLET ORAL at 09:34

## 2019-10-16 RX ADMIN — MIDAZOLAM HYDROCHLORIDE 2 MG: 1 INJECTION, SOLUTION INTRAMUSCULAR; INTRAVENOUS at 08:53

## 2019-10-16 RX ADMIN — METHOHEXITAL SODIUM 40 MG: 500 INJECTION, POWDER, LYOPHILIZED, FOR SOLUTION INTRAMUSCULAR; INTRAVENOUS; RECTAL at 08:54

## 2019-10-16 RX ADMIN — METHOHEXITAL SODIUM 20 MG: 500 INJECTION, POWDER, LYOPHILIZED, FOR SOLUTION INTRAMUSCULAR; INTRAVENOUS; RECTAL at 08:59

## 2019-10-16 NOTE — INTERVAL H&P NOTE
H&P reviewed. The patient was examined and there are no changes to the H&P.      Pre-cardiac Catheterization Report  Cardiovascular Laboratory  [facility]    Patient:  Mar Joseph  :  1950  PCP:  Adair Duarte MD  PHONE:  [phone]    DATE: 10/16/2019      MEDICATIONS:  Prior to Admission medications    Medication Sig Start Date End Date Taking? Authorizing Provider   atenolol (TENORMIN) 100 MG tablet Take 50 mg by mouth 2 (Two) Times a Day.   Yes Emergency, Nurse FAITH Aldana   bimatoprost (LUMIGAN) 0.01 % ophthalmic drops 1 drop every night.   Yes Emergency, Nurse FAITH Aldana   ELIQUIS 5 MG tablet tablet Take 1 tablet by mouth 2 (Two) Times a Day. 10/8/19  Yes Zahira Kwon MD   escitalopram (LEXAPRO) 10 MG tablet Take 10 mg by mouth daily.   Yes Emergency, Nurse FAITH Aldana   traMADol (ULTRAM) 50 MG tablet Take 1 tablet by mouth Every 12 (Twelve) Hours.   Yes Provider, MD Zahira       Past medical & surgical history, social and family history reviewed in the electronic medical record.    Physical Exam:    Vitals:   Vitals:    10/16/19 0718   BP: 131/88   Pulse: 93   Resp: 16   Temp: 97.3 °F (36.3 °C)   SpO2: 97%    There were no vitals filed for this visit.There is no height or weight on file to calculate BMI.    GENERAL: No apparent distress.  No significant changes since last exam.  CHEST: Clear to auscultation bilaterally no stridor no wheeze.  CV: S1, S2, Regular without Murmurs, Rubs or Gallops  EXTREMITIES: No edema.          Results from last 7 days   Lab Units 10/09/19  1345   SODIUM mmol/L 136   POTASSIUM mmol/L 4.3   CHLORIDE mmol/L 98   CO2 mmol/L 27.0   BUN mg/dL 13   CREATININE mg/dL 0.50*   GLUCOSE mg/dL 105*   CALCIUM mg/dL 10.0     Results from last 7 days   Lab Units 10/16/19  0744   WBC 10*3/mm3 6.85   HEMOGLOBIN g/dL 12.5   HEMATOCRIT % 37.4   PLATELETS 10*3/mm3 225   Estimated Creatinine Clearance: 63.6 mL/min (A) (by C-G formula based on SCr of 0.5 mg/dL  (L)).    IMPRESSION:  Afib    PLAN:  · YOLANDA/ECV      Electronically signed by RONAK Walsh, 10/16/19, 8:01 AM.

## 2019-10-17 RX ORDER — SOTALOL HYDROCHLORIDE 80 MG/1
80 TABLET ORAL EVERY 12 HOURS SCHEDULED
Qty: 60 TABLET | Refills: 3 | Status: SHIPPED | OUTPATIENT
Start: 2019-10-17 | End: 2020-01-24

## 2019-10-18 ENCOUNTER — CLINICAL SUPPORT (OUTPATIENT)
Dept: CARDIOLOGY | Facility: CLINIC | Age: 69
End: 2019-10-18

## 2019-10-18 DIAGNOSIS — I48.0 PAROXYSMAL ATRIAL FIBRILLATION (HCC): Primary | ICD-10-CM

## 2019-10-18 PROCEDURE — 93000 ELECTROCARDIOGRAM COMPLETE: CPT | Performed by: INTERNAL MEDICINE

## 2019-10-23 ENCOUNTER — TELEPHONE (OUTPATIENT)
Dept: CARDIOLOGY | Facility: CLINIC | Age: 69
End: 2019-10-23

## 2019-10-23 NOTE — TELEPHONE ENCOUNTER
Patient needs clearance to hold eliquis for 72 hours prior and 24 hours after epidural injections at [facility].      The patient had a recent successful cardioversion.  She is taking Eliquis.  They want more than the traditional 48 hour hold.  Please advise.        Telma  P: [phone]

## 2019-10-23 NOTE — TELEPHONE ENCOUNTER
The package insert for Eliquis states, hold treatment greater than 24 hours before surgery or invasive procedures with low bleeding risk or greater than 48 hours if moderate to high bleeding risk.  Being that she was only recently cardioverted from atrial fibrillation to sinus rhythm I am uncomfortable assuming the risk for an recommended delay in anticoagulation therapy.

## 2019-10-23 NOTE — TELEPHONE ENCOUNTER
Per Dr. Miquel little for the patient to hold Eliquis 72 hours prior and 24 hours after the procedure.    LVM with MD office.

## 2019-10-28 ENCOUNTER — TELEPHONE (OUTPATIENT)
Dept: CARDIOLOGY | Facility: CLINIC | Age: 69
End: 2019-10-28

## 2019-10-28 DIAGNOSIS — I48.0 PAROXYSMAL ATRIAL FIBRILLATION (HCC): Primary | ICD-10-CM

## 2019-10-28 NOTE — TELEPHONE ENCOUNTER
Pt called and stated that she has been in and out of a-fib since cardioversion 10/16/19.  Pt has been consistently in a-fib since Saturday 10/26/19. Pt has been off Eliqus 5 mg BID since 10/23/19. Pt states that her HR is 70s-100s.    Pt is supposed to restart Eliquis tomorrow morning.    Pt had epidural injections this morning.     Please advise

## 2019-10-28 NOTE — TELEPHONE ENCOUNTER
Telma from [facility] called and LVM asking about holding Eliquis. Returned call, no answer. LVM stating that per Dr Lafleur, see above, pt can hold Eliquis 72 hours prior to procedure and 24 hours after procedure.    Advised Telma to call back with any questions and left direct line number.

## 2019-11-01 ENCOUNTER — TELEPHONE (OUTPATIENT)
Dept: CARDIOLOGY | Facility: CLINIC | Age: 69
End: 2019-11-01

## 2019-11-01 NOTE — TELEPHONE ENCOUNTER
Patient called wanting to know if she needs to keep appointment with [facility] on 11/22/2019 and with JDA with EP on 11/25/2019.    Patient would like to move appointment with AA out a couple months.

## 2019-11-18 ENCOUNTER — OFFICE VISIT (OUTPATIENT)
Dept: FAMILY MEDICINE CLINIC | Facility: CLINIC | Age: 69
End: 2019-11-18

## 2019-11-18 VITALS
BODY MASS INDEX: 21.17 KG/M2 | OXYGEN SATURATION: 98 % | HEART RATE: 68 BPM | DIASTOLIC BLOOD PRESSURE: 70 MMHG | SYSTOLIC BLOOD PRESSURE: 130 MMHG | WEIGHT: 124 LBS | HEIGHT: 64 IN

## 2019-11-18 DIAGNOSIS — J01.90 ACUTE SINUSITIS, RECURRENCE NOT SPECIFIED, UNSPECIFIED LOCATION: Primary | ICD-10-CM

## 2019-11-18 DIAGNOSIS — F41.9 ANXIETY: ICD-10-CM

## 2019-11-18 PROCEDURE — 99203 OFFICE O/P NEW LOW 30 MIN: CPT | Performed by: FAMILY MEDICINE

## 2019-11-18 RX ORDER — IPRATROPIUM BROMIDE 42 UG/1
2 SPRAY, METERED NASAL 4 TIMES DAILY
Qty: 15 ML | Refills: 1 | Status: SHIPPED | OUTPATIENT
Start: 2019-11-18 | End: 2019-11-23

## 2019-11-18 RX ORDER — BROMPHENIRAMINE MALEATE, PSEUDOEPHEDRINE HYDROCHLORIDE, AND DEXTROMETHORPHAN HYDROBROMIDE 2; 30; 10 MG/5ML; MG/5ML; MG/5ML
5 SYRUP ORAL 4 TIMES DAILY PRN
Qty: 118 ML | Refills: 1 | Status: SHIPPED | OUTPATIENT
Start: 2019-11-18 | End: 2019-11-25

## 2019-11-18 RX ORDER — FLUTICASONE PROPIONATE 50 MCG
2 SPRAY, SUSPENSION (ML) NASAL 2 TIMES DAILY
Qty: 9.9 ML | Refills: 0 | Status: SHIPPED | OUTPATIENT
Start: 2019-11-18 | End: 2019-11-23
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
## 2019-11-18 NOTE — PATIENT INSTRUCTIONS
Patient instructions:  · Attain adequate rest and increase clear fluid intake.   · Use warm salt water gargles, lozenges, honey as a natural antitussive, and/or Delsym syrup as needed for cough.   · Use warm compresses over sinuses for comfort.   · Zinc lozenges may prevent the adhesion of viruses in the respiratory tract to reduce your risk of getting sick.  · Alternate use of Tylenol 500 mg and Ibuprofen 400 mg every 4 hours as needed for headache, body aches, and/or fever reduction  · Use Loratadine (Claritin), Cetrizine (Zyrtec), or Fexofenadine (Allegra) once daily over the counter, Flonase 1 spray each nostril daily unless instructed differently by prescription, and auto-inflate ears using modified valsalva maneuver approximately 20 times daily for ear congestion if needed.

## 2019-11-23 NOTE — PROGRESS NOTES
Cardiac Electrophysiology Outpatient Follow Up Note            [facility] at [facility]     Follow Up Office Visit      Mar Joseph  [id number]  11/25/2019  [unfilled]  [unfilled]    Primary Care Physician: Anand Pineda DO    Referred By: Marcelo Lafleur MD    Subjective     Chief Complaint:   Chief Complaint   Patient presents with   • Atrial Fibrillation     Problem List:     1. Persistent Atrial Fibrillation  a. CHADSVASc = 2  on Eliquis   b. Diagnosed 2013  c. Recurrence with YOLANDA/ECV 10/16/2019 / LVEF 60% / LA moderately dilated   2. Dyslipidemia  3. Chronic Colitis  4. Anxiety  5. Tobacco Abuse  6. Surgeries  a. Tibia / Fibula Fracture with surgical repair    History of Present Illness:   Mrs. Mar Joseph is a 69 y.o. female who presents to my electrophysiology clinic for follow up of  her persistent atrial fibrillation with consideration for antiarrhythmic therapy verses possible ablation.  She is currently taking Atenol and Eliquis therapy.  She notes she has had atrial fibrillation for some years in a paroxysmal atrial fibrillation.  The episodes make her feel tired fatigued and she feels her heart pounding awfully in her chest when it happens.  She had her first persistent episode of age fibrillation in October of this year which required transthoracic cardioversion.  Prior to this she was 90th antiarrhythmic drug therapy.  She was started on sotalol at that time.  She says that the episodes of age fibrillation and become less frequent and shorter but she is still having a couple of them every week.    She has no chest pain nausea vomiting fevers chills.  She has never passed out she has no presyncope.  She has no anginal symptoms heart failure symptoms.  She does not have orthopnea or paroxysmal nocturnal dyspnea.    She is tolerating Eliquis beautifully.    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    He had discussed with  Dr. Lafleur the option of catheter ablation procedure and is quite interested in this.    Review of Systems:   Review of Systems:   Constitutional: No fevers or chills, no recent weight gain or weight loss or fatigue  Eyes: No visual loss, blurred vision, double vision, yellow sclerae.  ENT: No headaches, hearing loss, vertigo, congestion or sore throat.   Cardiovascular: Per HPI  Respiratory: No cough or wheezing, no sputum production, no hematemesis   Gastrointestinal: No abdominal pain, no nausea, vomiting, constipation, diarrhea, melena.   Genitourinary: No dysuria, hematuria or increased frequency.  Musculoskeletal:  No gait disturbance, weakness or joint pain or stiffness  Integumentary: No rashes, urticaria, ulcers or sores.   Neurological: No headache, dizziness, syncope, paralysis, ataxia, no prior CVA/TIA  Psychiatric: No anxiety, or depression  Endocrine: No diaphoresis, cold or heat intolerance. No polyuria or polydipsia.   Hematologic/Lymphatic: No anemia, abnormal bruising or bleeding. No history of DVT/PE.       Past Medical History:   Past Medical History:   Diagnosis Date   • A-fib (CMS/HCC)    • Arrhythmia    • Arthritis    • Chronic alcohol use    • Chronic colitis    • History of cardioversion    • History of transesophageal echocardiography (YOLANDA)    • Menopause    • Palpitation     Benign palpitations   • Tibia/fibula fracture     Fall with right tibia/fibula fracture, status post tibia IM harish, September 2013.   • Tobacco use        Past Surgical History:   Past Surgical History:   Procedure Laterality Date   • LEG SURGERY Right     states five breaks, steal harish in R leg       Family History:   Family History   Problem Relation Age of Onset   • Asthma Mother    • No Known Problems Father    • Heart disease Sister    • Cancer Brother    • No Known Problems Maternal Grandmother    • No Known Problems Maternal Grandfather    • No Known Problems Paternal Grandmother    • No Known Problems Paternal  "Grandfather    • No Known Problems Brother    • Heart disease Other    • Heart attack Other    • Diabetes Other        Social History:   Social History     Socioeconomic History   • Marital status:      Spouse name: Not on file   • Number of children: Not on file   • Years of education: Not on file   • Highest education level: Not on file   Tobacco Use   • Smoking status: Current Every Day Smoker     Packs/day: 0.75     Types: Cigarettes   • Smokeless tobacco: Never Used   Substance and Sexual Activity   • Alcohol use: Yes     Frequency: Monthly or less     Drinks per session: 1 or 2     Comment: occasional   • Drug use: No   • Sexual activity: Defer   Social History Narrative    Caffeine: 1 decaf recently       Medications:     Current Outpatient Medications:   •  apixaban (ELIQUIS) 5 MG tablet tablet, Take 1 tablet by mouth 2 (Two) Times a Day., Disp: 180 tablet, Rfl: 3  •  bimatoprost (LUMIGAN) 0.01 % ophthalmic drops, 1 drop every night., Disp: , Rfl:   •  escitalopram (LEXAPRO) 10 MG tablet, Take 10 mg by mouth daily., Disp: , Rfl:   •  sotalol (BETAPACE) 80 MG tablet, Take 1 tablet by mouth Every 12 (Twelve) Hours., Disp: 60 tablet, Rfl: 3    Allergies:   Allergies   Allergen Reactions   • Penicillins Hives   • Sulfa Antibiotics Nausea Only     nausea       Objective     Physical Exam:  Vital Signs:   Vitals:    11/25/19 0935   BP: 130/64   BP Location: Left arm   Patient Position: Sitting   Pulse: 62   Weight: 56.7 kg (125 lb)   Height: 162.6 cm (64\")     GEN: Well nourished, well-developed, no acute distress  HEENT: Normocephalic, atraumatic, moist mucous membranes  NECK: Supple, no JVD, no thyromegaly, no lymphadenopathy  CARD: Regular rate and rhythm, normal S1 & S2 are present.  No murmur, gallop or rubs are appreciated.  LUNGS: Clear to auscultation bilateraly, normal respiratory effort  ABDOMEN: Soft, nontender, normal bowel sounds  EXTREMITIES: No gross deformities, no clubbing, cyanosis.  Edema " none  SKIN: Warm, dry  NEURO: No focal deficits, alert and oriented x 3  PSYCHIATRIC: Normal affect and mood, appropriate use of semantics and logic.        Lab Results   Component Value Date    GLUCOSE 92 10/16/2019    CALCIUM 9.5 10/16/2019     10/16/2019    K 4.5 10/16/2019    CO2 24.0 10/16/2019     10/16/2019    BUN 8 10/16/2019    CREATININE 0.43 (L) 10/16/2019    EGFRIFNONA 146 10/16/2019    BCR 18.6 10/16/2019    ANIONGAP 12.0 10/16/2019     Lab Results   Component Value Date    WBC 6.85 10/16/2019    HGB 12.5 10/16/2019    HCT 37.4 10/16/2019    MCV 98.2 (H) 10/16/2019     10/16/2019     No results found for: INR, PROTIME  Lab Results   Component Value Date    TSH 1.550 10/09/2019    B5FFWES 5.75 10/09/2019       Cardiac Testing:  .all cardiac testing.    I personally viewed and interpreted the patient's EKG/Telemetry/lab data      ECG 12 Lead  Date/Time: 11/25/2019 10:42 AM  Performed by: Isiah Garcia DO  Authorized by: Isiah Garcia DO   Comparison: compared with previous ECG from 10/19/2019  Comparison to previous ECG: Sinus rhythm is now present.  Rhythm: sinus rhythm    Clinical impression: normal ECG        Tobacco Cessation: Cessation Counseling Provided   Obstructive Sleep Apnea Screening: N/A    Assessment & Plan      paroxysmal atrial fibrillation and a 69-year-old female patient with a structurally normal heart and moderate left atrial enlargement.  Her symptoms are refractory to sotalol therapy and she has had breakthrough episodes that are highly symptomatic.  She is done extensive amount of reading about catheter ablation for age fibrillation and she would like to proceed.  I discussed with her at length a 1 to 2% procedural complication rate including but not limited to bleeding at the groin cardiac perforation tamponade stroke myocardial infarction death phrenic nerve injury atrial esophageal injury atrial esophageal fistula in the morbidities associated with this.   He is highly motivated to proceed.    We will book her for a cryo-balloon ablation at a time of her choosing.  We will have her stop her sotalol 3 days before the procedure.  A preoperative CT scan will be obtained.  We will leave her anticoagulated with apixaban throughout the procedure time.    The patient was able to give verbal informed consent after revisiting the key portions of the risk versus benefit profile of the procedure.  This discussion was framed by our lengthy conversations (please see our detailed notes).  Patient verbalized strong understanding of this discussion and a strong desire to proceed with the procedure.  Please note that this detailed informed consent process utilized mutual and shared decision making process ( [facility] [place] Sharing Decision Making Tool ) between all parties involved, principally the physician and patient, but also potentially with input from the patient's selected family and friends.    Ongoing Tobacco Abuse Counseling:    This patient currently uses tobacco products.  We discussed that tobacco abuse is an addiction and is strongly linked with poor health outcomes such as heart disease, stroke, vascular disease, cancer and premature death.  I strongly advised the patient that immediate cessation of tobacco is critical to preserving, maintaining and improving their health.  We discussed various methods of tobacco abuse cessation, including counseling and various pharmacologic and non-pharmacologic therapies. We spent over 10 minutes discussing all options pertinent to tobacco cessation.  The patient voiced a clear understanding of these risks and these medical facts regarding tobacco abuse.    Isiah Garcia DO, [facility], [facility]  Cardiac Electrophysiologist  [facility]      Follow Up:     Thank you for allowing me to participate in the care of your patient. Please to not hesitate to contact me with additional questions or  concerns.        Isiah Garcia, DO, FACC, [facility]  Cardiac Electrophysiologist

## 2019-11-25 ENCOUNTER — CONSULT (OUTPATIENT)
Dept: CARDIOLOGY | Facility: CLINIC | Age: 69
End: 2019-11-25

## 2019-11-25 VITALS
HEART RATE: 62 BPM | WEIGHT: 125 LBS | BODY MASS INDEX: 21.34 KG/M2 | DIASTOLIC BLOOD PRESSURE: 64 MMHG | HEIGHT: 64 IN | SYSTOLIC BLOOD PRESSURE: 130 MMHG

## 2019-11-25 DIAGNOSIS — I48.0 PAROXYSMAL ATRIAL FIBRILLATION (HCC): Primary | ICD-10-CM

## 2019-11-25 PROCEDURE — 93000 ELECTROCARDIOGRAM COMPLETE: CPT | Performed by: INTERNAL MEDICINE

## 2019-11-25 PROCEDURE — 99205 OFFICE O/P NEW HI 60 MIN: CPT | Performed by: INTERNAL MEDICINE

## 2019-11-25 PROCEDURE — 99406 BEHAV CHNG SMOKING 3-10 MIN: CPT | Performed by: INTERNAL MEDICINE

## 2020-01-24 RX ORDER — SOTALOL HYDROCHLORIDE 80 MG/1
TABLET ORAL
Qty: 180 TABLET | Refills: 1 | Status: SHIPPED | OUTPATIENT
Start: 2020-01-24 | End: 2020-02-03

## 2020-02-03 ENCOUNTER — OFFICE VISIT (OUTPATIENT)
Dept: CARDIOLOGY | Facility: CLINIC | Age: 70
End: 2020-02-03

## 2020-02-03 VITALS
DIASTOLIC BLOOD PRESSURE: 60 MMHG | BODY MASS INDEX: 21.85 KG/M2 | WEIGHT: 128 LBS | OXYGEN SATURATION: 98 % | HEIGHT: 64 IN | SYSTOLIC BLOOD PRESSURE: 112 MMHG | HEART RATE: 69 BPM

## 2020-02-03 DIAGNOSIS — I48.0 PAROXYSMAL ATRIAL FIBRILLATION (HCC): Primary | ICD-10-CM

## 2020-02-03 PROCEDURE — 93000 ELECTROCARDIOGRAM COMPLETE: CPT | Performed by: INTERNAL MEDICINE

## 2020-02-03 PROCEDURE — 99213 OFFICE O/P EST LOW 20 MIN: CPT | Performed by: INTERNAL MEDICINE

## 2020-02-03 RX ORDER — SOTALOL HYDROCHLORIDE 80 MG/1
120 TABLET ORAL EVERY 12 HOURS
Qty: 180 TABLET | Refills: 1
Start: 2020-02-03 | End: 2020-04-20 | Stop reason: SDUPTHER

## 2020-02-03 NOTE — PROGRESS NOTES
"[facility] at [facility]  Office visit  Mar WILLARD Weathers  1950    [phone] (work)    VISIT DATE:  2/3/2020    PCP: Anand Pineda DO  [id number]  [facility] [id number]    CC:  Chief Complaint   Patient presents with   • Atrial Fibrillation       Previous cardiac studies and procedures:  October 2019  Transesophageal echocardiogram with successful cardioversion  · Left ventricular systolic function is normal. Estimated EF = 60%.  · Mild to moderate biatrial enlargement.  · No evidence of intracardiac thrombus or mass, clear left atrial appendage.  · No significant valvular disease.    ASSESSMENT:   Diagnosis Plan   1. Paroxysmal atrial fibrillation (CMS/HCC)         PLAN:  Continue Eliquis 5 mg p.o. twice daily  Increasing sotalol 120 mg p.o. twice daily, repeat ECG 48 hours after initiation of increased dose.  If she still has breakthrough on 120 mg p.o. twice daily sotalol will consider an alternative such as flecainide.  Planning for potential A. fib ablation with Dr. Garcia this summer.    Subjective  History of paroxysmal atrial fibrillation, initial episode in 2013 with recurrence last fall.  Still having episodes of A. fib lasting 1 to 2 days, usually occurring on the weekends.  Feels fatigued with intermittent palpitations.  She says she can feels when it starts and stops.  No obvious triggers.  She is compliant with sotalol.  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    PHYSICAL EXAMINATION:  Vitals:    02/03/20 1021   BP: 112/60   BP Location: Left arm   Patient Position: Sitting   Pulse: 69   SpO2: 98%   Weight: 58.1 kg (128 lb)   Height: 162.6 cm (64\")     General Appearance:    Alert, cooperative, no distress, appears stated age   Head:    Normocephalic, without obvious abnormality, atraumatic   Eyes:    conjunctiva/corneas clear   Nose:   Nares normal, septum midline, mucosa normal, " no drainage   Throat:   Lips, teeth and gums normal   Neck:   Supple, symmetrical, trachea midline, no carotid    bruit or JVD   Lungs:     Clear to auscultation bilaterally, respirations unlabored   Chest Wall:    No tenderness or deformity    Heart:    Regular rate and rhythm, S1 and S2 normal, no murmur, rub   or gallop, normal carotid impulse bilaterally without bruit.   Abdomen:     Soft, non-tender   Extremities:   Extremities normal, atraumatic, no cyanosis or edema   Pulses:   2+ and symmetric all extremities   Skin:   Skin color, texture, turgor normal, no rashes or lesions       Diagnostic Data:    ECG 12 Lead  Date/Time: 2/3/2020 10:25 AM  Performed by: Kirk Ocasio III, MD  Authorized by: Kirk Ocasio III, MD   Comparison: compared with previous ECG from 11/25/2019  Similar to previous ECG  Rhythm: sinus rhythm    Clinical impression: normal ECG          No results found for: CHLPL, TRIG, HDL, LDLDIRECT  Lab Results   Component Value Date    GLUCOSE 92 10/16/2019    BUN 8 10/16/2019    CREATININE 0.43 (L) 10/16/2019     10/16/2019    K 4.5 10/16/2019     10/16/2019    CO2 24.0 10/16/2019     No results found for: HGBA1C  Lab Results   Component Value Date    WBC 6.85 10/16/2019    HGB 12.5 10/16/2019    HCT 37.4 10/16/2019     10/16/2019       Allergies  Allergies   Allergen Reactions   • Penicillins Hives   • Sulfa Antibiotics Nausea Only     nausea       Current Medications    Current Outpatient Medications:   •  apixaban (ELIQUIS) 5 MG tablet tablet, Take 1 tablet by mouth 2 (Two) Times a Day., Disp: 180 tablet, Rfl: 3  •  bimatoprost (LUMIGAN) 0.01 % ophthalmic drops, 1 drop every night., Disp: , Rfl:   •  escitalopram (LEXAPRO) 10 MG tablet, Take 10 mg by mouth daily., Disp: , Rfl:   •  sotalol (BETAPACE) 80 MG tablet, Take 1.5 tablets by mouth Every 12 (Twelve) Hours., Disp: 180 tablet, Rfl: 1          ROS  Review of Systems   Cardiovascular: Positive for irregular heartbeat  and palpitations.   Respiratory: Negative for cough and shortness of breath.        SOCIAL HX  Social History     Socioeconomic History   • Marital status:      Spouse name: Not on file   • Number of children: Not on file   • Years of education: Not on file   • Highest education level: Not on file   Tobacco Use   • Smoking status: Current Every Day Smoker     Packs/day: 0.25     Types: Cigarettes   • Smokeless tobacco: Never Used   Substance and Sexual Activity   • Alcohol use: Yes     Alcohol/week: 2.0 standard drinks     Types: 1 Glasses of wine, 1 Cans of beer per week     Frequency: Monthly or less     Drinks per session: 1 or 2     Comment: one daily   • Drug use: No   • Sexual activity: Defer   Social History Narrative    Caffeine: 1 decaf recently       FAMILY HX  Family History   Problem Relation Age of Onset   • Asthma Mother    • No Known Problems Father    • Heart disease Sister    • Cancer Brother    • No Known Problems Maternal Grandmother    • No Known Problems Maternal Grandfather    • No Known Problems Paternal Grandmother    • No Known Problems Paternal Grandfather    • No Known Problems Brother    • Heart disease Other    • Heart attack Other    • Diabetes Other              Kirk Ocasio III, MD, FACC

## 2020-02-10 ENCOUNTER — CLINICAL SUPPORT (OUTPATIENT)
Dept: CARDIOLOGY | Facility: CLINIC | Age: 70
End: 2020-02-10

## 2020-02-10 DIAGNOSIS — I48.0 PAROXYSMAL ATRIAL FIBRILLATION (HCC): Primary | ICD-10-CM

## 2020-02-10 PROCEDURE — 93000 ELECTROCARDIOGRAM COMPLETE: CPT | Performed by: INTERNAL MEDICINE

## 2020-04-20 RX ORDER — SOTALOL HYDROCHLORIDE 80 MG/1
120 TABLET ORAL EVERY 12 HOURS
Qty: 270 TABLET | Refills: 1 | Status: ON HOLD | OUTPATIENT
Start: 2020-04-20 | End: 2020-06-26

## 2020-05-18 ENCOUNTER — OFFICE VISIT (OUTPATIENT)
Dept: FAMILY MEDICINE CLINIC | Facility: CLINIC | Age: 70
End: 2020-05-18

## 2020-05-18 VITALS
DIASTOLIC BLOOD PRESSURE: 82 MMHG | HEART RATE: 71 BPM | SYSTOLIC BLOOD PRESSURE: 124 MMHG | BODY MASS INDEX: 22.26 KG/M2 | HEIGHT: 64 IN | OXYGEN SATURATION: 98 % | WEIGHT: 130.4 LBS

## 2020-05-18 DIAGNOSIS — Z00.00 MEDICARE ANNUAL WELLNESS VISIT, SUBSEQUENT: Primary | ICD-10-CM

## 2020-05-18 DIAGNOSIS — F41.9 ANXIETY: ICD-10-CM

## 2020-05-18 DIAGNOSIS — I48.0 PAROXYSMAL ATRIAL FIBRILLATION (HCC): ICD-10-CM

## 2020-05-18 DIAGNOSIS — E78.2 MIXED HYPERLIPIDEMIA: ICD-10-CM

## 2020-05-18 DIAGNOSIS — Z00.00 PREVENTATIVE HEALTH CARE: ICD-10-CM

## 2020-05-18 PROCEDURE — G0439 PPPS, SUBSEQ VISIT: HCPCS | Performed by: FAMILY MEDICINE

## 2020-05-18 PROCEDURE — 96160 PT-FOCUSED HLTH RISK ASSMT: CPT | Performed by: FAMILY MEDICINE

## 2020-05-18 PROCEDURE — 99397 PER PM REEVAL EST PAT 65+ YR: CPT | Performed by: FAMILY MEDICINE

## 2020-05-18 RX ORDER — ESCITALOPRAM OXALATE 10 MG/1
10 TABLET ORAL DAILY
Qty: 90 TABLET | Refills: 3 | Status: SHIPPED | OUTPATIENT
Start: 2020-05-18 | End: 2021-03-29

## 2020-06-01 ENCOUNTER — OFFICE VISIT (OUTPATIENT)
Dept: CARDIOLOGY | Facility: CLINIC | Age: 70
End: 2020-06-01

## 2020-06-01 VITALS
DIASTOLIC BLOOD PRESSURE: 74 MMHG | HEIGHT: 65 IN | HEART RATE: 91 BPM | OXYGEN SATURATION: 98 % | WEIGHT: 130.8 LBS | BODY MASS INDEX: 21.79 KG/M2 | TEMPERATURE: 97.6 F | SYSTOLIC BLOOD PRESSURE: 122 MMHG

## 2020-06-01 DIAGNOSIS — E78.2 MIXED HYPERLIPIDEMIA: ICD-10-CM

## 2020-06-01 DIAGNOSIS — I48.0 PAROXYSMAL ATRIAL FIBRILLATION (HCC): Primary | ICD-10-CM

## 2020-06-01 PROCEDURE — 93000 ELECTROCARDIOGRAM COMPLETE: CPT | Performed by: INTERNAL MEDICINE

## 2020-06-01 PROCEDURE — 99213 OFFICE O/P EST LOW 20 MIN: CPT | Performed by: INTERNAL MEDICINE

## 2020-06-01 NOTE — PROGRESS NOTES
"[facility] at [facility]  Office visit  Mar Joseph  1950    [phone] (work)    VISIT DATE:  6/1/2020    PCP: Anand Pineda DO  [address] [facility] [address]    CC:  Chief Complaint   Patient presents with   • Atrial Fibrillation       Previous cardiac studies and procedures:  October 2019  Transesophageal echocardiogram with successful cardioversion  · Left ventricular systolic function is normal. Estimated EF = 60%.  · Mild to moderate biatrial enlargement.  · No evidence of intracardiac thrombus or mass, clear left atrial appendage.  · No significant valvular disease.    ASSESSMENT:   Diagnosis Plan   1. Paroxysmal atrial fibrillation (CMS/HCC)     2. Mixed hyperlipidemia         PLAN:  Continue Eliquis 5 mg p.o. twice daily  Continue sotalol 120 mg p.o. twice daily  We will arrange for elective cardioversion this week if she does not self convert.  Arranging for potential pulmonary vein isolation with electrophysiology colleague    Subjective  History of paroxysmal atrial fibrillation, initial episode in 2013 with recurrence last fall.  She reports going out of rhythm approximately 2 days ago.  She has had generalized fatigue since that time.  Reports that she just feels depressed when she goes into [employer].  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  Twelve-lead EKG today reveals atrial fibrillation.  She reports that her episodes of atrial fibrillation usually last 2 to 3 days.    PHYSICAL EXAMINATION:  Vitals:    06/01/20 1154   BP: 122/74   BP Location: Left arm   Patient Position: Sitting   Pulse: 91   Temp: 97.6 °F (36.4 °C)   SpO2: 98%   Weight: 59.3 kg (130 lb 12.8 oz)   Height: 165.1 cm (65\")     General Appearance:    Alert, cooperative, no distress, appears stated age   Head:    Normocephalic, without obvious abnormality, atraumatic "   Eyes:    conjunctiva/corneas clear   Nose:   Nares normal, septum midline, mucosa normal, no drainage   Throat:   Lips, teeth and gums normal   Neck:   Supple, symmetrical, trachea midline, no carotid    bruit or JVD   Lungs:     Clear to auscultation bilaterally, respirations unlabored   Chest Wall:    No tenderness or deformity    Heart:   Irregularly irregular, S1 and S2 normal, no murmur, rub   or gallop, normal carotid impulse bilaterally without bruit.   Abdomen:     Soft, non-tender   Extremities:   Extremities normal, atraumatic, no cyanosis or edema   Pulses:   2+ and symmetric all extremities   Skin:   Skin color, texture, turgor normal, no rashes or lesions       Diagnostic Data:    ECG 12 Lead  Date/Time: 6/1/2020 12:22 PM  Performed by: Kirk Ocasio III, MD  Authorized by: Kirk Ocasio III, MD   Comparison: compared with previous ECG from 2/10/2020  Comparison to previous ECG: A. fib is replaced sinus rhythm  Rhythm: atrial fibrillation    Clinical impression: abnormal EKG          No results found for: CHLPL, TRIG, HDL, LDLDIRECT  Lab Results   Component Value Date    GLUCOSE 92 10/16/2019    BUN 8 10/16/2019    CREATININE 0.43 (L) 10/16/2019     10/16/2019    K 4.5 10/16/2019     10/16/2019    CO2 24.0 10/16/2019     No results found for: HGBA1C  Lab Results   Component Value Date    WBC 6.85 10/16/2019    HGB 12.5 10/16/2019    HCT 37.4 10/16/2019     10/16/2019       Allergies  Allergies   Allergen Reactions   • Penicillins Hives   • Sulfa Antibiotics Nausea Only     nausea       Current Medications    Current Outpatient Medications:   •  apixaban (ELIQUIS) 5 MG tablet tablet, Take 1 tablet by mouth 2 (Two) Times a Day., Disp: 180 tablet, Rfl: 3  •  bimatoprost (LUMIGAN) 0.01 % ophthalmic drops, 1 drop every night., Disp: , Rfl:   •  escitalopram (LEXAPRO) 10 MG tablet, Take 1 tablet by mouth Daily., Disp: 90 tablet, Rfl: 3  •  sotalol (BETAPACE) 80 MG tablet, Take 1.5 tablets  by mouth Every 12 (Twelve) Hours., Disp: 270 tablet, Rfl: 1          ROS  Review of Systems   Constitution: Positive for malaise/fatigue.   Cardiovascular: Positive for irregular heartbeat and palpitations.   Respiratory: Negative for cough and shortness of breath.        SOCIAL HX  Social History     Socioeconomic History   • Marital status:      Spouse name: Not on file   • Number of children: Not on file   • Years of education: Not on file   • Highest education level: Not on file   Tobacco Use   • Smoking status: Current Every Day Smoker     Packs/day: 0.25     Types: Cigarettes   • Smokeless tobacco: Never Used   Substance and Sexual Activity   • Alcohol use: Yes     Alcohol/week: 2.0 standard drinks     Types: 1 Glasses of wine, 1 Cans of beer per week     Frequency: Monthly or less     Drinks per session: 1 or 2     Comment: one daily   • Drug use: No   • Sexual activity: Defer   Social History Narrative    Caffeine: 1 decaf recently       FAMILY HX  Family History   Problem Relation Age of Onset   • Asthma Mother    • No Known Problems Father    • Heart disease Sister    • Cancer Brother    • No Known Problems Maternal Grandmother    • No Known Problems Maternal Grandfather    • No Known Problems Paternal Grandmother    • No Known Problems Paternal Grandfather    • No Known Problems Brother    • Heart disease Other    • Heart attack Other    • Diabetes Other              Kirk Ocasio III, MD, FACC

## 2020-06-03 DIAGNOSIS — I48.0 PAROXYSMAL ATRIAL FIBRILLATION (HCC): Primary | ICD-10-CM

## 2020-06-10 ENCOUNTER — TELEPHONE (OUTPATIENT)
Dept: CARDIOLOGY | Facility: CLINIC | Age: 70
End: 2020-06-10

## 2020-06-11 ENCOUNTER — PREP FOR SURGERY (OUTPATIENT)
Dept: OTHER | Facility: HOSPITAL | Age: 70
End: 2020-06-11

## 2020-06-11 DIAGNOSIS — I48.0 PAROXYSMAL ATRIAL FIBRILLATION (HCC): Primary | ICD-10-CM

## 2020-06-11 RX ORDER — SODIUM CHLORIDE 0.9 % (FLUSH) 0.9 %
3 SYRINGE (ML) INJECTION EVERY 12 HOURS SCHEDULED
Status: CANCELLED | OUTPATIENT
Start: 2020-06-11

## 2020-06-11 RX ORDER — SODIUM CHLORIDE 0.9 % (FLUSH) 0.9 %
10 SYRINGE (ML) INJECTION AS NEEDED
Status: CANCELLED | OUTPATIENT
Start: 2020-06-11

## 2020-06-11 RX ORDER — ACETAMINOPHEN 325 MG/1
650 TABLET ORAL EVERY 4 HOURS PRN
Status: CANCELLED | OUTPATIENT
Start: 2020-06-11

## 2020-06-11 RX ORDER — ONDANSETRON 2 MG/ML
4 INJECTION INTRAMUSCULAR; INTRAVENOUS EVERY 6 HOURS PRN
Status: CANCELLED | OUTPATIENT
Start: 2020-06-11

## 2020-06-11 RX ORDER — NITROGLYCERIN 0.4 MG/1
0.4 TABLET SUBLINGUAL
Status: CANCELLED | OUTPATIENT
Start: 2020-06-11

## 2020-06-22 NOTE — NURSING NOTE
PRE-PVA ASSESSMENT  Mar Joseph 1950   [address] [place], [place] [id number]     [phone] (work)      Referral Source: Dr Lafleur  Information obtained from: [x] Medical record review  [x] Patient report  Scheduled for: PVA on 6/26/20 with Dr. Garcia   Allergies   Allergen Reactions   • Penicillins Hives   • Sulfa Antibiotics Nausea Only     nausea       AFib Specific History:  AFIBTYPE: persistent    CHADS-VASc Risk Assessment            2       Total Score        1 Age 65-74    1 Sex: Female        Criteria that do not apply:    CHF    Hypertension    Age >/= 75    DM    PRIOR STROKE/TIA/THROMBO    Vascular Disease        Anticoagulation: Eliquis 5 mg bid (held medication 6/14, 6/15, 6/16 for back epidural B Holden aware)  Cardioversion x 1  Failed AAD(s): sotalol   Prior Ablation: No    Is Ms. Joseph aware of her AFib? Yes   Onset: 2013    Exacerbations: stress   Frequency:  persistent Alleviations: none    Duration: persistent     Symptoms:   [x] Palpitations:    [x] Chest Discomfort:    [] Dizziness:    [] Presyncope:    [] Lightheadedness:   [] Syncope:    [x] Fatigue:    [] Other:    [] Short of Breath:     Last Echo(s):           [x] YOLANDA Date: 10/16/19      EF: 60%           Mild to moderate biatrial enlargement, no significant            Valvular disease      Past medical History:   [] Diabetes  -No                No results found for: HGBA1C       [] HYPOthyroidism -No   [] HYPERthyroidism -No          TSH   Date Value Ref Range Status   10/09/2019 1.550 0.270 - 4.200 uIU/mL Final     [] HTN -No     [] Heart Failure -No     [] CVA -No                                [] TIA  -No         [] Ischemic         [] Hemorrhagic         [] Nonischemic         [] Embolic        [] Diastolic    [] CAD  -No        [] MI-No        [x] Dyslipidemia-per pt no med indicated, plans to start fish oil.  Managed by PCP.  Will have lipids rechecked soon      [] Ischemic Evaluation-No     [] Sleep Apnea Suspected-No         [] Obesity       No BMI 21.77     [] Depression  -No  [x] Anxiety                [] Urologic History-No        [] Urologic cancer surgery         [] Severe decrease in flow of urine stream        [] Recent unexplained gross hematuria       [] Hx urethral stricture     Other Pertinent PMH: chronic colitis, OA     Social / Lifestyle History:   [x]   Tobacco:                     [x] Current: 1/2 PPD x 35 yrs   [x]   Alcohol:          [x] Current: 1-2 beers per day   [x]   Caffeine: 1 cup decaf coffee per day   []   Recreational Drugs: No   []   Stress Issues: No    [x]   Exercise:  Gym three times week, bike and nu step    Summary of Patient Contact:  I spoke with Ms. Joseph about her upcoming PVA.   She was well informed about the procedure from prior discussion with Dr. Garcia and from reading the provided literature.  We discussed the procedure at length including risks, anesthesia, intra-op procedures, recovery, bedrest, sheath removal, discharge criteria, normal post-procedure expectations, and success rates.  I answered a few remaining questions. Ms. Joseph verbalized understanding and she is ready to proceed.      Ale Olivia RN

## 2020-06-24 ENCOUNTER — HOSPITAL ENCOUNTER (OUTPATIENT)
Dept: CT IMAGING | Facility: HOSPITAL | Age: 70
Discharge: HOME | End: 2020-06-24
Admitting: INTERNAL MEDICINE

## 2020-06-24 ENCOUNTER — APPOINTMENT (OUTPATIENT)
Dept: PREADMISSION TESTING | Facility: HOSPITAL | Age: 70
End: 2020-06-24

## 2020-06-24 DIAGNOSIS — I48.0 PAROXYSMAL ATRIAL FIBRILLATION (HCC): ICD-10-CM

## 2020-06-24 LAB
ANION GAP SERPL CALCULATED.3IONS-SCNC: 13 MMOL/L (ref 5–15)
BUN BLD-MCNC: 11 MG/DL (ref 8–23)
BUN/CREAT SERPL: 20.8 (ref 7–25)
CALCIUM SPEC-SCNC: 9.8 MG/DL (ref 8.6–10.5)
CHLORIDE SERPL-SCNC: 97 MMOL/L (ref 98–107)
CO2 SERPL-SCNC: 25 MMOL/L (ref 22–29)
CREAT BLD-MCNC: 0.53 MG/DL (ref 0.57–1)
DEPRECATED RDW RBC AUTO: 42.1 FL (ref 37–54)
ERYTHROCYTE [DISTWIDTH] IN BLOOD BY AUTOMATED COUNT: 11.9 % (ref 12.3–15.4)
GFR SERPL CREATININE-BSD FRML MDRD: 115 ML/MIN/1.73
GLUCOSE BLD-MCNC: 114 MG/DL (ref 65–99)
HCT VFR BLD AUTO: 41.5 % (ref 34–46.6)
HGB BLD-MCNC: 14.3 G/DL (ref 12–15.9)
MCH RBC QN AUTO: 33.4 PG (ref 26.6–33)
MCHC RBC AUTO-ENTMCNC: 34.5 G/DL (ref 31.5–35.7)
MCV RBC AUTO: 97 FL (ref 79–97)
PLATELET # BLD AUTO: 277 10*3/MM3 (ref 140–450)
PMV BLD AUTO: 10.7 FL (ref 6–12)
POTASSIUM BLD-SCNC: 4.5 MMOL/L (ref 3.5–5.2)
RBC # BLD AUTO: 4.28 10*6/MM3 (ref 3.77–5.28)
SODIUM BLD-SCNC: 135 MMOL/L (ref 136–145)
WBC NRBC COR # BLD: 8.85 10*3/MM3 (ref 3.4–10.8)

## 2020-06-24 PROCEDURE — 0 IOPAMIDOL PER 1 ML: Performed by: INTERNAL MEDICINE

## 2020-06-24 PROCEDURE — C9803 HOPD COVID-19 SPEC COLLECT: HCPCS

## 2020-06-24 PROCEDURE — U0004 COV-19 TEST NON-CDC HGH THRU: HCPCS

## 2020-06-24 PROCEDURE — 84443 ASSAY THYROID STIM HORMONE: CPT | Performed by: NURSE PRACTITIONER

## 2020-06-24 PROCEDURE — 71275 CT ANGIOGRAPHY CHEST: CPT

## 2020-06-24 PROCEDURE — 85027 COMPLETE CBC AUTOMATED: CPT | Performed by: NURSE PRACTITIONER

## 2020-06-24 PROCEDURE — U0002 COVID-19 LAB TEST NON-CDC: HCPCS

## 2020-06-24 PROCEDURE — 36415 COLL VENOUS BLD VENIPUNCTURE: CPT

## 2020-06-24 PROCEDURE — 80048 BASIC METABOLIC PNL TOTAL CA: CPT | Performed by: NURSE PRACTITIONER

## 2020-06-24 RX ORDER — TRAMADOL HYDROCHLORIDE 50 MG/1
50 TABLET ORAL 4 TIMES DAILY
COMMUNITY

## 2020-06-24 RX ORDER — DIPHENHYDRAMINE HCL 25 MG
25 CAPSULE ORAL EVERY 6 HOURS PRN
COMMUNITY
End: 2022-07-11

## 2020-06-24 RX ADMIN — IOPAMIDOL 95 ML: 755 INJECTION, SOLUTION INTRAVENOUS at 12:31

## 2020-06-24 NOTE — DISCHARGE INSTRUCTIONS

## 2020-06-25 ENCOUNTER — ANESTHESIA EVENT (OUTPATIENT)
Dept: CARDIOLOGY | Facility: HOSPITAL | Age: 70
End: 2020-06-25

## 2020-06-25 LAB
REF LAB TEST METHOD: NORMAL
SARS-COV-2 RNA RESP QL NAA+PROBE: NOT DETECTED
TSH SERPL DL<=0.05 MIU/L-ACNC: 1.19 UIU/ML (ref 0.27–4.2)

## 2020-06-26 ENCOUNTER — ANESTHESIA (OUTPATIENT)
Dept: CARDIOLOGY | Facility: HOSPITAL | Age: 70
End: 2020-06-26

## 2020-06-26 ENCOUNTER — HOSPITAL ENCOUNTER (OUTPATIENT)
Facility: HOSPITAL | Age: 70
Discharge: HOME | End: 2020-06-27
Attending: INTERNAL MEDICINE | Admitting: INTERNAL MEDICINE

## 2020-06-26 DIAGNOSIS — I48.0 PAROXYSMAL ATRIAL FIBRILLATION (HCC): ICD-10-CM

## 2020-06-26 LAB
ACT BLD: 290 SECONDS (ref 82–152)
ACT BLD: 323 SECONDS (ref 82–152)
ACT BLD: 345 SECONDS (ref 82–152)
ACT BLD: 384 SECONDS (ref 82–152)

## 2020-06-26 PROCEDURE — C1894 INTRO/SHEATH, NON-LASER: HCPCS | Performed by: INTERNAL MEDICINE

## 2020-06-26 PROCEDURE — C1732 CATH, EP, DIAG/ABL, 3D/VECT: HCPCS | Performed by: INTERNAL MEDICINE

## 2020-06-26 PROCEDURE — 93622 COMP EP EVAL L VENTR PAC&REC: CPT | Performed by: INTERNAL MEDICINE

## 2020-06-26 PROCEDURE — 93623 PRGRMD STIMJ&PACG IV RX NFS: CPT | Performed by: INTERNAL MEDICINE

## 2020-06-26 PROCEDURE — C1759 CATH, INTRA ECHOCARDIOGRAPHY: HCPCS | Performed by: INTERNAL MEDICINE

## 2020-06-26 PROCEDURE — 25010000002 DEXAMETHASONE SODIUM PHOSPHATE 100 MG/10ML SOLUTION: Performed by: NURSE ANESTHETIST, CERTIFIED REGISTERED

## 2020-06-26 PROCEDURE — 25010000002 ONDANSETRON PER 1 MG: Performed by: NURSE PRACTITIONER

## 2020-06-26 PROCEDURE — C1766 INTRO/SHEATH,STRBLE,NON-PEEL: HCPCS | Performed by: INTERNAL MEDICINE

## 2020-06-26 PROCEDURE — 25010000002 PROTAMINE SULFATE PER 10 MG: Performed by: INTERNAL MEDICINE

## 2020-06-26 PROCEDURE — 25010000002 PROPOFOL 10 MG/ML EMULSION: Performed by: NURSE ANESTHETIST, CERTIFIED REGISTERED

## 2020-06-26 PROCEDURE — 93613 INTRACARDIAC EPHYS 3D MAPG: CPT | Performed by: INTERNAL MEDICINE

## 2020-06-26 PROCEDURE — 25010000002 PHENYLEPHRINE PER 1 ML: Performed by: NURSE ANESTHETIST, CERTIFIED REGISTERED

## 2020-06-26 PROCEDURE — 25010000002 HEPARIN (PORCINE) PER 1000 UNITS: Performed by: INTERNAL MEDICINE

## 2020-06-26 PROCEDURE — 93662 INTRACARDIAC ECG (ICE): CPT | Performed by: INTERNAL MEDICINE

## 2020-06-26 PROCEDURE — C1730 CATH, EP, 19 OR FEW ELECT: HCPCS | Performed by: INTERNAL MEDICINE

## 2020-06-26 PROCEDURE — 93656 COMPRE EP EVAL ABLTJ ATR FIB: CPT | Performed by: INTERNAL MEDICINE

## 2020-06-26 PROCEDURE — C1893 INTRO/SHEATH, FIXED,NON-PEEL: HCPCS | Performed by: INTERNAL MEDICINE

## 2020-06-26 PROCEDURE — 25010000002 ADENOSINE PER 6 MG: Performed by: INTERNAL MEDICINE

## 2020-06-26 PROCEDURE — 93657 TX L/R ATRIAL FIB ADDL: CPT | Performed by: INTERNAL MEDICINE

## 2020-06-26 PROCEDURE — C1769 GUIDE WIRE: HCPCS | Performed by: INTERNAL MEDICINE

## 2020-06-26 PROCEDURE — 25010000002 KETOROLAC TROMETHAMINE PER 15 MG: Performed by: INTERNAL MEDICINE

## 2020-06-26 PROCEDURE — 25010000002 NEOSTIGMINE 10 MG/10ML SOLUTION: Performed by: NURSE ANESTHETIST, CERTIFIED REGISTERED

## 2020-06-26 PROCEDURE — 25010000002 FENTANYL CITRATE (PF) 100 MCG/2ML SOLUTION: Performed by: NURSE ANESTHETIST, CERTIFIED REGISTERED

## 2020-06-26 PROCEDURE — 85347 COAGULATION TIME ACTIVATED: CPT

## 2020-06-26 RX ORDER — LATANOPROST 50 UG/ML
1 SOLUTION/ DROPS OPHTHALMIC NIGHTLY
Status: DISCONTINUED | OUTPATIENT
Start: 2020-06-26 | End: 2020-06-27 | Stop reason: HOSPADM

## 2020-06-26 RX ORDER — ROCURONIUM BROMIDE 10 MG/ML
INJECTION, SOLUTION INTRAVENOUS AS NEEDED
Status: DISCONTINUED | OUTPATIENT
Start: 2020-06-26 | End: 2020-06-26 | Stop reason: SURG

## 2020-06-26 RX ORDER — DEXAMETHASONE SODIUM PHOSPHATE 4 MG/ML
INJECTION, SOLUTION INTRA-ARTICULAR; INTRALESIONAL; INTRAMUSCULAR; INTRAVENOUS; SOFT TISSUE AS NEEDED
Status: DISCONTINUED | OUTPATIENT
Start: 2020-06-26 | End: 2020-06-26 | Stop reason: SURG

## 2020-06-26 RX ORDER — TRAMADOL HYDROCHLORIDE 50 MG/1
50 TABLET ORAL EVERY 12 HOURS PRN
Status: DISCONTINUED | OUTPATIENT
Start: 2020-06-26 | End: 2020-06-27 | Stop reason: HOSPADM

## 2020-06-26 RX ORDER — HYDROMORPHONE HYDROCHLORIDE 1 MG/ML
0.5 INJECTION, SOLUTION INTRAMUSCULAR; INTRAVENOUS; SUBCUTANEOUS
Status: DISCONTINUED | OUTPATIENT
Start: 2020-06-26 | End: 2020-06-26 | Stop reason: HOSPADM

## 2020-06-26 RX ORDER — ACETAMINOPHEN 500 MG
500 TABLET ORAL EVERY 6 HOURS PRN
COMMUNITY

## 2020-06-26 RX ORDER — LIDOCAINE HYDROCHLORIDE 10 MG/ML
0.5 INJECTION, SOLUTION EPIDURAL; INFILTRATION; INTRACAUDAL; PERINEURAL ONCE AS NEEDED
Status: DISCONTINUED | OUTPATIENT
Start: 2020-06-26 | End: 2020-06-27 | Stop reason: HOSPADM

## 2020-06-26 RX ORDER — BUPIVACAINE HYDROCHLORIDE 5 MG/ML
INJECTION, SOLUTION PERINEURAL AS NEEDED
Status: DISCONTINUED | OUTPATIENT
Start: 2020-06-26 | End: 2020-06-26 | Stop reason: HOSPADM

## 2020-06-26 RX ORDER — ESMOLOL HYDROCHLORIDE 10 MG/ML
INJECTION INTRAVENOUS AS NEEDED
Status: DISCONTINUED | OUTPATIENT
Start: 2020-06-26 | End: 2020-06-26 | Stop reason: SURG

## 2020-06-26 RX ORDER — LIDOCAINE HYDROCHLORIDE 10 MG/ML
INJECTION, SOLUTION EPIDURAL; INFILTRATION; INTRACAUDAL; PERINEURAL AS NEEDED
Status: DISCONTINUED | OUTPATIENT
Start: 2020-06-26 | End: 2020-06-26 | Stop reason: SURG

## 2020-06-26 RX ORDER — ESCITALOPRAM OXALATE 10 MG/1
10 TABLET ORAL NIGHTLY
Status: DISCONTINUED | OUTPATIENT
Start: 2020-06-26 | End: 2020-06-27 | Stop reason: HOSPADM

## 2020-06-26 RX ORDER — DIPHENHYDRAMINE HCL 25 MG
25 CAPSULE ORAL EVERY 6 HOURS PRN
Status: DISCONTINUED | OUTPATIENT
Start: 2020-06-26 | End: 2020-06-27 | Stop reason: HOSPADM

## 2020-06-26 RX ORDER — ONDANSETRON 2 MG/ML
4 INJECTION INTRAMUSCULAR; INTRAVENOUS EVERY 6 HOURS PRN
Status: DISCONTINUED | OUTPATIENT
Start: 2020-06-26 | End: 2020-06-26 | Stop reason: HOSPADM

## 2020-06-26 RX ORDER — ACETAMINOPHEN 500 MG
500 TABLET ORAL EVERY 6 HOURS PRN
Status: DISCONTINUED | OUTPATIENT
Start: 2020-06-26 | End: 2020-06-27 | Stop reason: HOSPADM

## 2020-06-26 RX ORDER — NITROGLYCERIN 0.4 MG/1
0.4 TABLET SUBLINGUAL
Status: DISCONTINUED | OUTPATIENT
Start: 2020-06-26 | End: 2020-06-26 | Stop reason: HOSPADM

## 2020-06-26 RX ORDER — KETOROLAC TROMETHAMINE 30 MG/ML
15 INJECTION, SOLUTION INTRAMUSCULAR; INTRAVENOUS EVERY 8 HOURS
Status: DISCONTINUED | OUTPATIENT
Start: 2020-06-26 | End: 2020-06-27 | Stop reason: HOSPADM

## 2020-06-26 RX ORDER — GLYCOPYRROLATE 0.2 MG/ML
INJECTION INTRAMUSCULAR; INTRAVENOUS AS NEEDED
Status: DISCONTINUED | OUTPATIENT
Start: 2020-06-26 | End: 2020-06-26 | Stop reason: SURG

## 2020-06-26 RX ORDER — SODIUM CHLORIDE 0.9 % (FLUSH) 0.9 %
3 SYRINGE (ML) INJECTION EVERY 12 HOURS SCHEDULED
Status: DISCONTINUED | OUTPATIENT
Start: 2020-06-26 | End: 2020-06-26 | Stop reason: HOSPADM

## 2020-06-26 RX ORDER — HEPARIN SODIUM 10000 [USP'U]/100ML
INJECTION, SOLUTION INTRAVENOUS CONTINUOUS PRN
Status: DISCONTINUED | OUTPATIENT
Start: 2020-06-26 | End: 2020-06-26 | Stop reason: HOSPADM

## 2020-06-26 RX ORDER — PROTAMINE SULFATE 10 MG/ML
INJECTION, SOLUTION INTRAVENOUS AS NEEDED
Status: DISCONTINUED | OUTPATIENT
Start: 2020-06-26 | End: 2020-06-26 | Stop reason: HOSPADM

## 2020-06-26 RX ORDER — SODIUM CHLORIDE 0.9 % (FLUSH) 0.9 %
10 SYRINGE (ML) INJECTION AS NEEDED
Status: DISCONTINUED | OUTPATIENT
Start: 2020-06-26 | End: 2020-06-27 | Stop reason: HOSPADM

## 2020-06-26 RX ORDER — PROPOFOL 10 MG/ML
VIAL (ML) INTRAVENOUS AS NEEDED
Status: DISCONTINUED | OUTPATIENT
Start: 2020-06-26 | End: 2020-06-26 | Stop reason: SURG

## 2020-06-26 RX ORDER — SODIUM CHLORIDE, SODIUM LACTATE, POTASSIUM CHLORIDE, CALCIUM CHLORIDE 600; 310; 30; 20 MG/100ML; MG/100ML; MG/100ML; MG/100ML
9 INJECTION, SOLUTION INTRAVENOUS CONTINUOUS PRN
Status: DISCONTINUED | OUTPATIENT
Start: 2020-06-26 | End: 2020-06-27 | Stop reason: HOSPADM

## 2020-06-26 RX ORDER — ADENOSINE 3 MG/ML
INJECTION, SOLUTION INTRAVENOUS AS NEEDED
Status: DISCONTINUED | OUTPATIENT
Start: 2020-06-26 | End: 2020-06-26 | Stop reason: HOSPADM

## 2020-06-26 RX ORDER — HEPARIN SODIUM 1000 [USP'U]/ML
INJECTION, SOLUTION INTRAVENOUS; SUBCUTANEOUS AS NEEDED
Status: DISCONTINUED | OUTPATIENT
Start: 2020-06-26 | End: 2020-06-26 | Stop reason: HOSPADM

## 2020-06-26 RX ORDER — SODIUM CHLORIDE 0.9 % (FLUSH) 0.9 %
10 SYRINGE (ML) INJECTION AS NEEDED
Status: DISCONTINUED | OUTPATIENT
Start: 2020-06-26 | End: 2020-06-26 | Stop reason: HOSPADM

## 2020-06-26 RX ORDER — FAMOTIDINE 20 MG/1
20 TABLET, FILM COATED ORAL
Status: DISCONTINUED | OUTPATIENT
Start: 2020-06-26 | End: 2020-06-27 | Stop reason: HOSPADM

## 2020-06-26 RX ORDER — LIDOCAINE HYDROCHLORIDE 10 MG/ML
INJECTION, SOLUTION EPIDURAL; INFILTRATION; INTRACAUDAL; PERINEURAL AS NEEDED
Status: DISCONTINUED | OUTPATIENT
Start: 2020-06-26 | End: 2020-06-26 | Stop reason: HOSPADM

## 2020-06-26 RX ORDER — TRAMADOL HYDROCHLORIDE 50 MG/1
50 TABLET ORAL ONCE AS NEEDED
Status: COMPLETED | OUTPATIENT
Start: 2020-06-26 | End: 2020-06-26

## 2020-06-26 RX ORDER — ONDANSETRON 2 MG/ML
4 INJECTION INTRAMUSCULAR; INTRAVENOUS ONCE AS NEEDED
Status: DISCONTINUED | OUTPATIENT
Start: 2020-06-26 | End: 2020-06-26 | Stop reason: HOSPADM

## 2020-06-26 RX ORDER — FENTANYL CITRATE 50 UG/ML
50 INJECTION, SOLUTION INTRAMUSCULAR; INTRAVENOUS
Status: DISCONTINUED | OUTPATIENT
Start: 2020-06-26 | End: 2020-06-26 | Stop reason: HOSPADM

## 2020-06-26 RX ORDER — SODIUM CHLORIDE 0.9 % (FLUSH) 0.9 %
10 SYRINGE (ML) INJECTION EVERY 12 HOURS SCHEDULED
Status: DISCONTINUED | OUTPATIENT
Start: 2020-06-26 | End: 2020-06-27 | Stop reason: HOSPADM

## 2020-06-26 RX ORDER — NEOSTIGMINE METHYLSULFATE 1 MG/ML
INJECTION, SOLUTION INTRAVENOUS AS NEEDED
Status: DISCONTINUED | OUTPATIENT
Start: 2020-06-26 | End: 2020-06-26 | Stop reason: SURG

## 2020-06-26 RX ORDER — ACETAMINOPHEN 325 MG/1
650 TABLET ORAL EVERY 4 HOURS PRN
Status: DISCONTINUED | OUTPATIENT
Start: 2020-06-26 | End: 2020-06-26 | Stop reason: HOSPADM

## 2020-06-26 RX ADMIN — KETOROLAC TROMETHAMINE 15 MG: 30 INJECTION, SOLUTION INTRAMUSCULAR; INTRAVENOUS at 15:50

## 2020-06-26 RX ADMIN — PROPOFOL 200 MG: 10 INJECTION, EMULSION INTRAVENOUS at 08:39

## 2020-06-26 RX ADMIN — ESCITALOPRAM OXALATE 10 MG: 10 TABLET ORAL at 21:04

## 2020-06-26 RX ADMIN — LIDOCAINE HYDROCHLORIDE 50 MG: 10 INJECTION, SOLUTION EPIDURAL; INFILTRATION; INTRACAUDAL; PERINEURAL at 08:39

## 2020-06-26 RX ADMIN — GLYCOPYRROLATE 0.3 MG: 0.2 INJECTION INTRAMUSCULAR; INTRAVENOUS at 12:19

## 2020-06-26 RX ADMIN — ROCURONIUM BROMIDE 10 MG: 10 SOLUTION INTRAVENOUS at 10:43

## 2020-06-26 RX ADMIN — KETOROLAC TROMETHAMINE 15 MG: 30 INJECTION, SOLUTION INTRAMUSCULAR; INTRAVENOUS at 20:52

## 2020-06-26 RX ADMIN — ONDANSETRON 4 MG: 2 INJECTION INTRAMUSCULAR; INTRAVENOUS at 12:08

## 2020-06-26 RX ADMIN — ESMOLOL HYDROCHLORIDE 20 MG: 10 INJECTION, SOLUTION INTRAVENOUS at 08:56

## 2020-06-26 RX ADMIN — SODIUM CHLORIDE, POTASSIUM CHLORIDE, SODIUM LACTATE AND CALCIUM CHLORIDE: 600; 310; 30; 20 INJECTION, SOLUTION INTRAVENOUS at 08:21

## 2020-06-26 RX ADMIN — EPHEDRINE SULFATE 10 MG: 50 INJECTION INTRAMUSCULAR; INTRAVENOUS; SUBCUTANEOUS at 09:06

## 2020-06-26 RX ADMIN — ROCURONIUM BROMIDE 10 MG: 10 SOLUTION INTRAVENOUS at 09:51

## 2020-06-26 RX ADMIN — SODIUM CHLORIDE, PRESERVATIVE FREE 10 ML: 5 INJECTION INTRAVENOUS at 22:05

## 2020-06-26 RX ADMIN — LATANOPROST 1 DROP: 50 SOLUTION OPHTHALMIC at 21:58

## 2020-06-26 RX ADMIN — ESMOLOL HYDROCHLORIDE 30 MG: 10 INJECTION, SOLUTION INTRAVENOUS at 08:39

## 2020-06-26 RX ADMIN — TRAMADOL HYDROCHLORIDE 50 MG: 50 TABLET, FILM COATED ORAL at 13:02

## 2020-06-26 RX ADMIN — FENTANYL CITRATE 50 MCG: 50 INJECTION INTRAMUSCULAR; INTRAVENOUS at 12:39

## 2020-06-26 RX ADMIN — TRAMADOL HYDROCHLORIDE 50 MG: 50 TABLET, FILM COATED ORAL at 22:04

## 2020-06-26 RX ADMIN — PHENYLEPHRINE HYDROCHLORIDE 0.1 MCG/KG/MIN: 10 INJECTION INTRAVENOUS at 09:02

## 2020-06-26 RX ADMIN — APIXABAN 5 MG: 5 TABLET, FILM COATED ORAL at 20:52

## 2020-06-26 RX ADMIN — ROCURONIUM BROMIDE 50 MG: 10 SOLUTION INTRAVENOUS at 08:39

## 2020-06-26 RX ADMIN — FENTANYL CITRATE 50 MCG: 50 INJECTION INTRAMUSCULAR; INTRAVENOUS at 12:46

## 2020-06-26 RX ADMIN — DEXAMETHASONE SODIUM PHOSPHATE 4 MG: 4 INJECTION, SOLUTION INTRA-ARTICULAR; INTRALESIONAL; INTRAMUSCULAR; INTRAVENOUS; SOFT TISSUE at 08:39

## 2020-06-26 RX ADMIN — ROCURONIUM BROMIDE 20 MG: 10 SOLUTION INTRAVENOUS at 11:00

## 2020-06-26 RX ADMIN — NEOSTIGMINE METHYLSULFATE 3 MG: 1 INJECTION, SOLUTION INTRAVENOUS at 12:19

## 2020-06-26 RX ADMIN — FAMOTIDINE 20 MG: 20 TABLET, FILM COATED ORAL at 06:49

## 2020-06-26 NOTE — OP NOTE
Cardiac Electrophysiology Procedure Note           [facility]          CATHETER ABLATION FOR ATRIAL FIBRILLATION (PVI)    PROCEDURES PERFORMED:    · Catheter ablation of Paroxysmal and Persistent atrial fibrillation  · Full diagnostic EP study  · 3D electroanatomic mapping  · drug infusion / programmed pacing  · Intracadiac Echocardiography  · Double transeptal puncture  · Left ventricular pacing and recording    PREPROCEDURAL DIAGNOSES:    1. Paroxysmal and Persistent Atrial fibrillation  2. [id number] score of 2    POST PROCEDURE DIANGOSES:  As above.    INDICATION FOR PROCEDURE:  Briefly, Mar Joseph is a 69 y.o. year old female with a history of highly symptomatic and drug refractory atrial fibrillation.    ANTICOAGULATION STRATEGY PRIOR TO AND POST PROCEDURE:  apixiban 5 mg po bid.  The last dose of anticoagulant was confirmed to have been taken this morning.      PT/INR:  No results found for: LABPROT, INR  PTT:  No results found for: APTT    CBC:   WBC   Date Value Ref Range Status   06/24/2020 8.85 3.40 - 10.80 10*3/mm3 Final     RBC   Date Value Ref Range Status   06/24/2020 4.28 3.77 - 5.28 10*6/mm3 Final     Hemoglobin   Date Value Ref Range Status   06/24/2020 14.3 12.0 - 15.9 g/dL Final     Hematocrit   Date Value Ref Range Status   06/24/2020 41.5 34.0 - 46.6 % Final     MCV   Date Value Ref Range Status   06/24/2020 97.0 79.0 - 97.0 fL Final     RDW   Date Value Ref Range Status   06/24/2020 11.9 (L) 12.3 - 15.4 % Final     Platelets   Date Value Ref Range Status   06/24/2020 277 140 - 450 10*3/mm3 Final     BMP:     Sodium   Date Value Ref Range Status   06/24/2020 135 (L) 136 - 145 mmol/L Final     Potassium   Date Value Ref Range Status   06/24/2020 4.5 3.5 - 5.2 mmol/L Final     Chloride   Date Value Ref Range Status   06/24/2020 97 (L) 98 - 107 mmol/L Final     CO2   Date Value Ref Range Status   06/24/2020 25.0 22.0 - 29.0 mmol/L Final     BUN   Date  "Value Ref Range Status   06/24/2020 11 8 - 23 mg/dL Final     Creatinine   Date Value Ref Range Status   06/24/2020 0.53 (L) 0.57 - 1.00 mg/dL Final       Vital Signs: /61   Pulse 56   Temp 97.3 °F (36.3 °C)   Resp 16   Ht 162.6 cm (64\")   Wt 60.4 kg (133 lb 1.6 oz)   LMP  (LMP Unknown)   SpO2 93%   BMI 22.85 kg/m²      Admit Weight:  60.4 kg (133 lb 1.6 oz)  BMI: Body mass index is 22.85 kg/m².    PROCEDURE NARRATIVE:    The patient was able to give written informed consent after revisiting the key portions of the risk versus benefit profile of the procedure.  This discussion was framed by our lengthy conversations  (please see our detailed notes).  Patient verbalized strong understanding of this discussion and a strong desire to proceed with the procedure.  Please note that this detailed informed consent process utilized mutual and shared decision making process between all parties involved, principally the physician and patient, but also potentially with input from the patient's selected family and friends.    The patient was brought to the EP laboratory in the post absorptive state.  The patient was electively intubated for the procedure and given a general anesthetic by colleagues from anesthesia.   An aubrey-gastric tube was placed by anesthesia staff.  A radial artery catheter was placed by anesthesia staff for continuous blood pressure monitoring.  Please see the detailed anesthesia records.    The patient was then prepared and draped in a routing sterile fashion.  Seldinger access was obtained at the right common femoral vein with three venipunctures.  J tip wires were advanced into the vascular space.  Short 10, 9 , 6 Amharic sheath, an SL0 sheath and a deflectable sheath were placed into the right common femoral vein and the inferior vena cava / right atrium in an over the wire fashion.    The patient was anticoagulated with intravenous heparin (initial bolus and then continuous infusion) with a " goal ACT of between 350 and 400 seconds.  A phased array ICE catheter was placed into the right atrium and right ventricle.  This was used for transeptal puncture, monitoring of the pericardial space, monitoring of the ablation catheter position within the heart and monitoring of other cardiac structures such as the left atrial appendage (to document the absence of thrombus), pulmonary veins, esophagus, mitral valve annulus and other cardiac structures.    Double transeptal puncture was performed after heparin administration with a combination of echocardiographic and fluoroscopic guidance.  This was performed with the long sheaths, a BRK needle, and a SafeSept transeptal wire.  Catheters and sheaths were advanced safely into the left atrium.  A 64 electrode José Miguel diagnostic electrophysiology catheter and a [employer] irrigated tip ablation catheter were used for pacing and recording in the left atrium, left atrial appendage, pulmonary veins and left ventricle at various times throughout the procedure.  We used the [employer] 3D electroanatomic mapping system in conjunction with these catheters to construct an electroanatomic shell of the left atrium, left atrial appendage, mitral valve annulus, interatrial septum and pulmonary veins.  Left ventricular pacing and recording were performed by placing catheters safely and carefully across the mitral valve annulus to the left ventricle from the left atrium.  Adequate sensing and pacing thresholds were obtained from the left ventricle.  AV conduction ( antegrade ) as well as VA conduction ( retrograde ) were studied.  This was performed as a distinct addition to the diagnostic EP study.  Findings were conclusive for intact VA conduction.      An EP study was performed.  Data obtained from this is listed in the below table:    Initial Study    Isuprel Washout Study           drive train / burst extra    QRS 99    Rhythm          Atrial CL      R-R 1038     Ventricular CL      AH 90    AVBCL      HV 54    AVNERP       drive train / burst extrastim   Slow Pway ERP      Rhythm     Fast Pway ERP      Atrial CL     VABCL conc? /  Dec?      Ventricular CL     VAERP      AVBCL 420    AERP      AVNERP 600 280   VERP      Slow Pway ERP     AP antegrade ERP      Fast Pway ERP     AP retrograde ERP      VABCL conc? /  Dec? 390          VAERP 600 230  Final Study      AERP      drive train / burst extrastim    VERP     Rhythm      AP antegrade ERP     Atrial CL      AP retrograde ERP     Ventricular CL           AVBCL     Isuprel Dose =      AVNERP       drive train / burst extrastim   Slow Pway ERP      Rhythm     Fast Pway ERP      Atrial CL     VABCL conc? /  Dec?      Ventricular CL     VAERP      AVBCL     AERP      AVNERP     VERP      Slow Pway ERP     AP antegrade ERP      Fast Pway ERP     AP retrograde ERP      VABCL conc? /  Dec?           VAERP           AERP           VERP           AP antegrade ERP           AP retrograde ERP                       Catheter ablation was performed to achieve pulmonary vein isolation.  A wide antral circumferential ablation approach was used.  Power was limited to 50 W on the posterior left atrium and 50 W elsewhere.  Lesions were made using proprietary Direct Sense technology.   Lesions were placed 1-3 cm away from the ostia of the pulmonary veins and never in proximity to the esophagus.  Additional lesions were given within the antral lesion set at the dru between superior and inferior veins to achieve total isolation, when and where necessary.      The patient remained in atrial fibrillation.  Adjunctive ablation was performed to achieve isolation of the entire left atrial posterior wall.  At no time was any ablation near the esophagus performed to achieve left atrial posterior wall isolation.    At various points we manipulated the esophagus away from the posterior antrum where we were ablating using an Esosure esophageal  "manipulation device.  No lesions were given anywhere near the esophagus at any point.  Please also note that we visualized the full with of the esophagus with barium administered via the orogastric tube.   Patient tolerated this manipulation extremely well.    After completing the antral ablation lesion set, I interrogated the pulmonary veins using and documented pulmonary vein isolation.    12 mg adenosine was administered intravenously following ablation to test for other atrial and or ventricular arrhythmias.   Programmed stimulation was performed in an attempt to induce other and additional arrhythmias.  No arrhythmias were induced during isuprel administration and washout.    Catheters and sheaths were removed from the left atrium and heparin was discontinued.      The ICE catheter revealed that there was no pericardial effusion.    Catheters and sheaths were then removed from the body.  Hemostasis was achieved with manual pressure after reversal of anticoagulation with protamine.  A hemostatic \"figure of eight\" suture was applied at the groin.  This suture is to be removed prior to the patient being discharged home.  The patient was extubated in routine fashion and transferred to PACU in stable condition.    COMPLICATIONS: none    EBL: minimal    RADIATION EXPOSURE: 28 mGy over 22.4 minutes    KEY PROCEDURAL FINDINGS:  ·  Normal AV node and His Purkinjie function  ·  Antral pulmonary vein isolation  ·  Left atrial posterior wall isolation    POST PROCEDURAL PLAN:    ·  Report was called the the PACU nurse responsible for the patients care.  · Uninterrupted anticoagulation for not less than 90 days unless specially instructed otherwise by myself or another member of our EP physician team.  Please note that the patient and the patient’s family have been extensively counseled about this critical requirement and have agreed to comply.  · Esophageal prophylaxis with proton pump inhibitor for 90 days.  · Anticipate " discharge tomorrow.  · Medications were reconciled, and key changes in medications include: stop sotalol  · Patient and family instructed to call immediately for fever greater than 101.5 degrees F, hematemesis, increasing or severe chest pain, increasing shortness of breath, bleeding or other concerns.  Patient and family instructed that some chest discomfort is normal and is to be expected and that this should be expected to decrease over the first 3-4 days after the ablation procedure.  · The patient will be seen at our office per routine follow up.      Isiah Garcia DO, FACC, [facility]  Cardiac Electrophysiologist  [facility]

## 2020-06-26 NOTE — ANESTHESIA POSTPROCEDURE EVALUATION
Patient: Mar Joseph    Procedure Summary     Date:  06/26/20 Room / Location:  [facility] CATH/EP LAB [facility] / [facility] EP INVASIVE LOCATION    Anesthesia Start:  0821 Anesthesia Stop:  1237    Procedure:  PVA (paroxysmal), hold Sotalol 3 days, DNS Eliquis, Rhythmia (BSC) only using direct sense (N/A ) Diagnosis:       Paroxysmal atrial fibrillation (CMS/HCC)      (afib)    Provider:  Isiah Garcia DO Provider:  Antoni Hou MD    Anesthesia Type:  general ASA Status:  3          Anesthesia Type: general    Vitals  Vitals Value Taken Time   /61 6/26/2020 12:37 PM   Temp     Pulse 53 6/26/2020 12:37 PM   Resp 18 6/26/2020 12:37 PM   SpO2 100 % 6/26/2020 12:37 PM           Post Anesthesia Care and Evaluation    Patient location during evaluation: PACU  Patient participation: complete - patient participated  Level of consciousness: awake and alert  Pain score: 0  Pain management: adequate  Airway patency: patent  Anesthetic complications: No anesthetic complications  PONV Status: none  Cardiovascular status: hemodynamically stable and acceptable  Respiratory status: nonlabored ventilation, acceptable and nasal cannula  Hydration status: acceptable

## 2020-06-26 NOTE — PLAN OF CARE
Problem: Patient Care Overview  Goal: Plan of Care Review  Flowsheets (Taken 6/26/2020 9942)  Plan of Care Reviewed With: patient  Note:   Pt came to floor from CV s/p BIJAN. XAVI. NSR on tele. RA. Up ad ashley in room. Will continue to monitor and DC home in AM if no overnight issues.

## 2020-06-26 NOTE — H&P
Cardiac Electrophysiology History and Physical          [facility]     History & Physical      PATIENT NAME  Mar Joseph    :  1950 AGE: 69 y.o.    ADMISSION DATE: 2020     Subjective      CHIEF COMPLAINT: Paroxysmal atrial fibrillation    Problem List:      1. Paroxysmal  Atrial Fibrillation  a. CHADSVASc = 2    b. Diagnosed   c. Maintained on Atenolol and Eliquis   d. Recurrence with YOLANDA/ECV 10/16/2019 / LVEF 60% / LA moderately dilated   e. Initiated on Sotalol Therapy   f. Recurrence 2020 with ECV to NSR  2. Dyslipidemia  3. Chronic Colitis  4. Anxiety  5. Tobacco Abuse  6. Surgeries  a. Tibia / Fibula Fracture with surgical repair    HISTORY OF PRESENT ILLNESS:  Mrs. Mar Joseph is a 69-year-old white female who presents today for elective pulmonary vein ablation.  Ms. Joseph was seen in EP consultation in 2019 with planned PVI in March, however due to the COVID 19 pandemic her procedure was delayed.  She is noted to have sustained recurrence earlier this month requiring presentation for electrocardioversion.  She is currently maintained on sotalol and Eliquis therapy without noted side effects or signs of bleeding.  Patient reports weekly episodes of palpitations, shortness of breath, fatigue, and dizziness that often last up to 24-48 hours in duration.  Patient denies TIA/strokelike symptoms.  Patient reports that her last dose of Eliquis and sotalol were last evening.  Patient denies recent infections or signs of fever, chills, sweats, or cough.  She denies recent sick contacts.  Her preoperative lab values are reviewed and acceptable.  She has a negative COVID screening documented within the last 72 hours.      PAST MEDICAL HISTORY  Past Medical History:   Diagnosis Date   • A-fib (CMS/HCC)    • Arrhythmia    • Arthritis    • Chronic alcohol use    • Chronic colitis    • History of cardioversion    • History of transesophageal  echocardiography (YOLANDA)    • Menopause    • Palpitation     Benign palpitations   • Tibia/fibula fracture     Fall with right tibia/fibula fracture, status post tibia IM harish, September 2013.   • Tobacco use    • Wears glasses        SURGICAL HISTORY   has a past surgical history that includes Leg Surgery (Right); Colonoscopy (2015); and Cardioversion.     SOCIAL HISTORY  Social History     Socioeconomic History   • Marital status:      Spouse name: Not on file   • Number of children: Not on file   • Years of education: Not on file   • Highest education level: Not on file   Tobacco Use   • Smoking status: Current Every Day Smoker     Packs/day: 0.25     Years: 45.00     Pack years: 11.25     Types: Cigarettes   • Smokeless tobacco: Never Used   Substance and Sexual Activity   • Alcohol use: Yes     Alcohol/week: 2.0 standard drinks     Types: 1 Glasses of wine, 1 Cans of beer per week     Frequency: Monthly or less     Drinks per session: 1 or 2     Comment: 1-2 daily   • Drug use: No   • Sexual activity: Defer   Social History Narrative    Caffeine: 1 decaf recently       FAMILY HISTORY  family history includes Asthma in her mother; Cancer in her brother; Diabetes in an other family member; Heart attack in an other family member; Heart disease in her sister and another family member; No Known Problems in her brother, father, maternal grandfather, maternal grandmother, paternal grandfather, and paternal grandmother.     MEDICATIONS  Prior to Admission medications    Medication Sig Start Date End Date Taking? Authorizing Provider   acetaminophen (TYLENOL) 500 MG tablet Take 500 mg by mouth Every 6 (Six) Hours As Needed for Mild Pain .   Yes Provider, MD Zahira   apixaban (ELIQUIS) 5 MG tablet tablet Take 1 tablet by mouth 2 (Two) Times a Day. 10/16/19  Yes Marcelo Lafleur MD   bimatoprost (LUMIGAN) 0.01 % ophthalmic drops Administer 1 drop to both eyes Every Night.   Yes Emergency, Nurse Jovan, RN    "  diphenhydrAMINE (BENADRYL) 25 mg capsule Take 25 mg by mouth Every 6 (Six) Hours As Needed for Sleep.   Yes Provider, MD Zahira   escitalopram (LEXAPRO) 10 MG tablet Take 1 tablet by mouth Daily. 5/18/20  Yes Anand Pineda,    traMADol (ULTRAM) 50 MG tablet Take 50 mg by mouth Every 12 (Twelve) Hours As Needed for Moderate Pain .   Yes Provider, MD Zahira   sotalol (BETAPACE) 80 MG tablet Take 1.5 tablets by mouth Every 12 (Twelve) Hours.  Patient taking differently: Take 120 mg by mouth Every 12 (Twelve) Hours. Dr. Garcia's office instructed patient to take last dose on 6-22-20. 4/20/20   Kirk Ocasio III, MD       ALLERGIES  Allergies   Allergen Reactions   • Penicillins Hives   • Sulfa Antibiotics Nausea Only     nausea       REVIEW OF SYSTEMS  Constitutional: No fevers or chills, no recent weight gain or weight loss, + fatigue  Eyes: No visual loss, blurred vision, double vision, yellow sclerae.  ENT: No headaches, hearing loss, vertigo, congestion or sore throat.   Cardiovascular: Per HPI  Respiratory: No cough or wheezing, no sputum production, no hematemesis, + soa, long   Gastrointestinal: No abdominal pain, no nausea, vomiting, constipation, diarrhea, melena.   Genitourinary: No dysuria, hematuria or increased frequency.  Musculoskeletal:  No gait disturbance, weakness or joint pain or stiffness  Integumentary: No rashes, urticaria, ulcers or sores.   Neurological: No headache, dizziness, syncope, paralysis, ataxia, no prior CVA/TIA  Psychiatric: No anxiety, or depression  Endocrine: No diaphoresis, cold or heat intolerance. No polyuria or polydipsia.   Hematologic/Lymphatic: No anemia, abnormal bruising or bleeding. No history of DVT/PE.      Objective      PHYSICAL EXAM    Vital Signs: /77 (BP Location: Left arm, Patient Position: Lying)   Pulse 63   Temp 97.8 °F (36.6 °C) (Temporal)   Resp 14   Ht 162.6 cm (64\")   Wt 60.4 kg (133 lb 1.6 oz)   LMP  (LMP Unknown)   SpO2 98%  " " BMI 22.85 kg/m²      General appearance: Awake, alert, cooperative  Head: Normocephalic, without obvious abnormality, atraumatic  Eyes: Conjunctivae/corneas clear, EOM's intact  Neck: no adenopathy, no carotid bruit, no JVD and thyroid: not enlarged  Lungs: clear to auscultation bilaterally and no rhonchi or crackles\", ' symmetric  Heart: regular rate and rhythm, S1, S2 normal, no murmur, click, rub or gallop  Abdomen: Soft, non-tender. Bowel sounds normal,  no organomegaly  Extremities: extremities normal, atraumatic, no cyanosis or edema  Skin: Skin color, turgor normal, no rashes or lesions  Neurologic: Grossly normal       CBC: WBC   Date Value Ref Range Status   06/24/2020 8.85 3.40 - 10.80 10*3/mm3 Final     RBC   Date Value Ref Range Status   06/24/2020 4.28 3.77 - 5.28 10*6/mm3 Final     Hemoglobin   Date Value Ref Range Status   06/24/2020 14.3 12.0 - 15.9 g/dL Final     Hematocrit   Date Value Ref Range Status   06/24/2020 41.5 34.0 - 46.6 % Final     MCV   Date Value Ref Range Status   06/24/2020 97.0 79.0 - 97.0 fL Final     RDW   Date Value Ref Range Status   06/24/2020 11.9 (L) 12.3 - 15.4 % Final     Platelets   Date Value Ref Range Status   06/24/2020 277 140 - 450 10*3/mm3 Final     BMP:  Results from last 7 days   Lab Units 06/24/20  1054   POTASSIUM mmol/L 4.5   CHLORIDE mmol/L 97*   CO2 mmol/L 25.0   BUN mg/dL 11   CREATININE mg/dL 0.53*   GLUCOSE mg/dL 114*   CALCIUM mg/dL 9.8       CURRENT MEDICATIONS:  )  famotidine 20 mg Oral 60 Min Pre-Op   sodium chloride 10 mL Intravenous Q12H   sodium chloride 3 mL Intravenous Q12H     CONTINUOUS INFUSIONS:    lactated ringers 9 mL/hr         TELEMETRY: NSR 65 bpm    Assessment & Plan     Ms. Joseph presents today for elective pulmonary vein ablation in treatment for her symptomatic and refractory paroxysmal atrial fibrillation.  Patient reports continued weekly episodes of recurrence lasting up to 48 hours and associated with increased fatigue, " shortness of breath, and dizziness.  She is currently maintained on sotalol and Eliquis therapy with reported last dose was yesterday evening.  Her preoperative lab values are reviewed and acceptable.  She has a negative COVID screening documented within the last 72 hours.  All risk, benefits, and alternatives to the procedure were outlined reviewed in detail.  She verbalized complete understanding of the procedure and is willing to proceed as scheduled.      Electronically signed by ALDO Feng, 06/26/20, 7:12 AM.      This note was completed using a voice transcription system. Every effort was made to ensure accuracy. However, inadvertent computerized transcription errors may be present.

## 2020-06-26 NOTE — ANESTHESIA PROCEDURE NOTES
Airway  Urgency: elective    Date/Time: 6/26/2020 8:58 AM  Airway not difficult    General Information and Staff    Patient location during procedure: OR  CRNA: Steve Morelos CRNA    Indications and Patient Condition  Indications for airway management: airway protection    Preoxygenated: yes  MILS not maintained throughout  Mask difficulty assessment: 1 - vent by mask    Final Airway Details  Final airway type: endotracheal airway      Successful airway: ETT  Cuffed: yes   Successful intubation technique: video laryngoscopy  Facilitating devices/methods: intubating stylet  Endotracheal tube insertion site: oral  Blade: Glidescope  Blade size: 3  ETT size (mm): 7.0  Cormack-Lehane Classification: grade I - full view of glottis  Placement verified by: chest auscultation and capnometry   Measured from: lips  ETT/EBT  to lips (cm): 20  Number of attempts at approach: 1  Assessment: lips, teeth, and gum same as pre-op and atraumatic intubation    Additional Comments  Negative epigastric sounds, Breath sound equal bilaterally with symmetric chest rise and fall. Grade III with Mac 3. Waited 15 min for staff to find glidescope. Had to call anesthesia tech to locate EP scope. Easy intubation with glidescope.

## 2020-06-26 NOTE — ANESTHESIA PREPROCEDURE EVALUATION
Anesthesia Evaluation     Patient summary reviewed and Nursing notes reviewed   NPO Solid Status: > 8 hours  NPO Liquid Status: > 4 hours           Airway   Mallampati: III  TM distance: >3 FB  Neck ROM: limited  Possible difficult intubation  Dental      Pulmonary    (+) a smoker Current,   (-) COPD, asthma, recent URI, sleep apnea, no home oxygen  Cardiovascular     ECG reviewed    (+) dysrhythmias Paroxysmal Atrial Fib, hyperlipidemia,   (-) past MI, angina, cardiac stents    ROS comment: ECG A fib     In NSR now on monitor     Neuro/Psych  (+) psychiatric history,     (-) seizures, CVA  GI/Hepatic/Renal/Endo    (-) liver disease, no renal disease, diabetes, no thyroid disorder    ROS Comment: Chronic colitis     Musculoskeletal     Abdominal    Substance History      OB/GYN          Other   arthritis,      ROS/Med Hx Other: elquis                  Anesthesia Plan    ASA 3     general   (Non invasive contin BP monitoring  (clearsight )     Vs A LINE)  intravenous induction     Anesthetic plan, all risks, benefits, and alternatives have been provided, discussed and informed consent has been obtained with: patient.    Plan discussed with CRNA.

## 2020-06-27 VITALS
WEIGHT: 133.1 LBS | SYSTOLIC BLOOD PRESSURE: 138 MMHG | RESPIRATION RATE: 16 BRPM | TEMPERATURE: 98.1 F | DIASTOLIC BLOOD PRESSURE: 78 MMHG | HEART RATE: 77 BPM | OXYGEN SATURATION: 96 % | BODY MASS INDEX: 22.72 KG/M2 | HEIGHT: 64 IN

## 2020-06-27 PROCEDURE — 25010000002 KETOROLAC TROMETHAMINE PER 15 MG: Performed by: INTERNAL MEDICINE

## 2020-06-27 RX ORDER — PANTOPRAZOLE SODIUM 40 MG/1
40 TABLET, DELAYED RELEASE ORAL DAILY
Qty: 30 TABLET | Refills: 5 | Status: SHIPPED | OUTPATIENT
Start: 2020-06-27 | End: 2020-12-14

## 2020-06-27 RX ADMIN — KETOROLAC TROMETHAMINE 15 MG: 30 INJECTION, SOLUTION INTRAMUSCULAR; INTRAVENOUS at 05:45

## 2020-06-27 RX ADMIN — SODIUM CHLORIDE, PRESERVATIVE FREE 10 ML: 5 INJECTION INTRAVENOUS at 09:09

## 2020-06-27 RX ADMIN — APIXABAN 5 MG: 5 TABLET, FILM COATED ORAL at 09:09

## 2020-06-27 NOTE — PLAN OF CARE
Problem: Patient Care Overview  Goal: Plan of Care Review  Outcome: Ongoing (interventions implemented as appropriate)  Flowsheets (Taken 6/27/2020 1033)  Plan of Care Reviewed With: patient  Outcome Summary: patient states pain free, ready to go, incision sites clear, left side dried blood/  Areas soft with  no drainage.  Goal: Individualization and Mutuality  Outcome: Ongoing (interventions implemented as appropriate)  Goal: Discharge Needs Assessment  Outcome: Ongoing (interventions implemented as appropriate)  Goal: Interprofessional Rounds/Family Conf  Outcome: Ongoing (interventions implemented as appropriate)     Problem: Arrhythmia/Dysrhythmia (Symptomatic) (Adult)  Goal: Signs and Symptoms of Listed Potential Problems Will be Absent, Minimized or Managed (Arrhythmia/Dysrhythmia)  Outcome: Ongoing (interventions implemented as appropriate)

## 2020-06-27 NOTE — DISCHARGE SUMMARY
Date of Discharge:  6/27/2020              [facility]  Date of Admit: 6/26/2020    Anand Pineda, DO      Discharge Diagnosis:  Paroxysmal atrial fibrillation (CMS/HCC)      Hospital Course: Ms. Joseph is a pleasant 69-year-old female with a history of atrial fibrillation.  She is chronically anticoagulated with Eliquis.  She was previously treated with sotalol however breakthrough episodes.  She was admitted to [facility] on 6/26/2020 and underwent a pulmonary vein ablation.  She tolerated procedure well, there were no complications.  Today she is denying any complaints and is therefore felt ready for discharge.    Procedures Performed  Procedure(s):  PVA (paroxysmal), hold Sotalol 3 days, DNS Eliquis, Rhythmia (BSC) only using direct sense       Consults     No orders found for last 30 day(s).          Pertinent Test Results: Pulmonary vein ablation  .      Discharge Physical Exam:    General Appearance No acute distress   Neck No adenopathy, supple, trachea midline, no JVD   Lungs Clear to auscultation,respirations regular, even and unlabored   Heart Regular rhythm and normal rate, normal S1 and S2, no murmur, no gallop, no rub, no click   Chest wall No abnormalities observed   Abdomen Normal bowel sounds, no masses, no hepatomegaly, soft, bilateral groins stable   Extremities Moves all extremities well, no edema, no cyanosis, no redness   Neurological Alert and oriented x 3     Discharge Medications     Discharge Medications      New Medications      Instructions Start Date   pantoprazole 40 MG EC tablet  Commonly known as:  PROTONIX   40 mg, Oral, Daily         Continue These Medications      Instructions Start Date   acetaminophen 500 MG tablet  Commonly known as:  TYLENOL   500 mg, Oral, Every 6 Hours PRN      apixaban 5 MG tablet tablet  Commonly known as:  Eliquis   5 mg, Oral, 2 Times Daily      bimatoprost 0.01 % ophthalmic drops  Commonly known as:  LUMIGAN   1  drop, Both Eyes, Nightly      diphenhydrAMINE 25 mg capsule  Commonly known as:  BENADRYL   25 mg, Oral, Every 6 Hours PRN      escitalopram 10 MG tablet  Commonly known as:  LEXAPRO   10 mg, Oral, Daily      traMADol 50 MG tablet  Commonly known as:  ULTRAM   50 mg, Oral, Every 12 Hours PRN             Discharge Diet: cardiac    Activity at Discharge: as tolerated    Discharge disposition: home    Condition on Discharge: stable    Follow-up Appointments  Future Appointments   Date Time Provider Department Center   11/18/2020 12:30 PM Anand Pineda DO [facility] None   12/14/2020  1:30 PM Kirk Ocasio III, MD [facility] None     Additional Instructions for the Follow-ups that You Need to Schedule     Discharge Follow-up with Specified Provider: Dr. Garcia; 1 Month   As directed      To:  Dr. Garcia    Follow Up:  1 Month                  RONAK Gagnon  06/27/20  09:57

## 2020-06-29 ENCOUNTER — OFFICE VISIT (OUTPATIENT)
Dept: CARDIOLOGY | Facility: HOSPITAL | Age: 70
End: 2020-06-29

## 2020-06-29 ENCOUNTER — TELEPHONE (OUTPATIENT)
Dept: CARDIOLOGY | Facility: HOSPITAL | Age: 70
End: 2020-06-29

## 2020-06-29 VITALS
BODY MASS INDEX: 22.77 KG/M2 | HEART RATE: 85 BPM | OXYGEN SATURATION: 99 % | RESPIRATION RATE: 18 BRPM | TEMPERATURE: 98.4 F | SYSTOLIC BLOOD PRESSURE: 130 MMHG | DIASTOLIC BLOOD PRESSURE: 74 MMHG | HEIGHT: 64 IN | WEIGHT: 133.38 LBS

## 2020-06-29 DIAGNOSIS — I48.0 PAROXYSMAL ATRIAL FIBRILLATION (HCC): Primary | ICD-10-CM

## 2020-06-29 DIAGNOSIS — E78.2 MIXED HYPERLIPIDEMIA: ICD-10-CM

## 2020-06-29 PROCEDURE — 99213 OFFICE O/P EST LOW 20 MIN: CPT | Performed by: NURSE PRACTITIONER

## 2020-06-29 NOTE — TELEPHONE ENCOUNTER
S/P PVA 6/26/20.  Pt phoned and reports continued oozing from R groin since yesterday.  Bandage in place with saturation.  Pt states the area is soft.  Pt instructed to apply firm pressure for 20 mins and worked in to see ALDO Gleason today at 145.  Will call if symptoms change or worsen.    Ale Olivia RN

## 2020-07-17 ENCOUNTER — LAB (OUTPATIENT)
Dept: LAB | Facility: HOSPITAL | Age: 70
End: 2020-07-17

## 2020-07-17 DIAGNOSIS — E87.1 HYPONATREMIA: ICD-10-CM

## 2020-07-18 LAB
BUN SERPL-MCNC: 16 MG/DL (ref 8–27)
BUN/CREAT SERPL: 36 (ref 12–28)
CALCIUM SERPL-MCNC: 9.9 MG/DL (ref 8.7–10.3)
CHLORIDE SERPL-SCNC: 99 MMOL/L (ref 96–106)
CO2 SERPL-SCNC: 24 MMOL/L (ref 20–29)
CREAT SERPL-MCNC: 0.45 MG/DL (ref 0.57–1)
GLUCOSE SERPL-MCNC: 108 MG/DL (ref 65–99)
POTASSIUM SERPL-SCNC: 4.6 MMOL/L (ref 3.5–5.2)
SODIUM SERPL-SCNC: 135 MMOL/L (ref 134–144)

## 2020-07-21 ENCOUNTER — LAB REQUISITION (OUTPATIENT)
Dept: LAB | Facility: HOSPITAL | Age: 70
End: 2020-07-21

## 2020-07-21 ENCOUNTER — OFFICE VISIT (OUTPATIENT)
Dept: FAMILY MEDICINE CLINIC | Facility: CLINIC | Age: 70
End: 2020-07-21

## 2020-07-21 VITALS
WEIGHT: 132.2 LBS | HEIGHT: 64 IN | DIASTOLIC BLOOD PRESSURE: 72 MMHG | BODY MASS INDEX: 22.57 KG/M2 | OXYGEN SATURATION: 97 % | HEART RATE: 84 BPM | SYSTOLIC BLOOD PRESSURE: 128 MMHG

## 2020-07-21 DIAGNOSIS — Z12.31 BREAST CANCER SCREENING BY MAMMOGRAM: ICD-10-CM

## 2020-07-21 DIAGNOSIS — I48.0 PAROXYSMAL ATRIAL FIBRILLATION (HCC): ICD-10-CM

## 2020-07-21 DIAGNOSIS — E87.1 HYPONATREMIA: Primary | ICD-10-CM

## 2020-07-21 DIAGNOSIS — E78.2 MIXED HYPERLIPIDEMIA: ICD-10-CM

## 2020-07-21 DIAGNOSIS — Z00.00 ROUTINE GENERAL MEDICAL EXAMINATION AT A HEALTH CARE FACILITY: ICD-10-CM

## 2020-07-21 DIAGNOSIS — R73.01 FASTING HYPERGLYCEMIA: ICD-10-CM

## 2020-07-21 PROCEDURE — 90732 PPSV23 VACC 2 YRS+ SUBQ/IM: CPT | Performed by: FAMILY MEDICINE

## 2020-07-21 PROCEDURE — 99214 OFFICE O/P EST MOD 30 MIN: CPT | Performed by: FAMILY MEDICINE

## 2020-07-21 PROCEDURE — G0009 ADMIN PNEUMOCOCCAL VACCINE: HCPCS | Performed by: FAMILY MEDICINE

## 2020-07-21 PROCEDURE — 36415 COLL VENOUS BLD VENIPUNCTURE: CPT | Performed by: FAMILY MEDICINE

## 2020-07-22 LAB
ALBUMIN SERPL-MCNC: 4.7 G/DL (ref 3.8–4.8)
ALBUMIN/GLOB SERPL: 1.9 {RATIO} (ref 1.2–2.2)
ALP SERPL-CCNC: 64 IU/L (ref 39–117)
ALT SERPL-CCNC: 17 IU/L (ref 0–32)
APPEARANCE UR: CLEAR
AST SERPL-CCNC: 19 IU/L (ref 0–40)
BACTERIA #/AREA URNS HPF: ABNORMAL /[HPF]
BILIRUB SERPL-MCNC: 0.4 MG/DL (ref 0–1.2)
BILIRUB UR QL STRIP: NEGATIVE
BUN SERPL-MCNC: 18 MG/DL (ref 8–27)
BUN/CREAT SERPL: 40 (ref 12–28)
CALCIUM SERPL-MCNC: 10 MG/DL (ref 8.7–10.3)
CHLORIDE SERPL-SCNC: 95 MMOL/L (ref 96–106)
CHOLEST SERPL-MCNC: 263 MG/DL (ref 100–199)
CO2 SERPL-SCNC: 25 MMOL/L (ref 20–29)
COLOR UR: YELLOW
CREAT SERPL-MCNC: 0.45 MG/DL (ref 0.57–1)
CRP SERPL HS-MCNC: 1.47 MG/L (ref 0–3)
EPI CELLS #/AREA URNS HPF: ABNORMAL /HPF (ref 0–10)
GLOBULIN SER CALC-MCNC: 2.5 G/DL (ref 1.5–4.5)
GLUCOSE SERPL-MCNC: 101 MG/DL (ref 65–99)
GLUCOSE UR QL: NEGATIVE
HBA1C MFR BLD: 5.6 % (ref 4.8–5.6)
HDLC SERPL-MCNC: 111 MG/DL
HGB UR QL STRIP: NEGATIVE
KETONES UR QL STRIP: NEGATIVE
LDLC SERPL CALC-MCNC: 136 MG/DL (ref 0–99)
LEUKOCYTE ESTERASE UR QL STRIP: ABNORMAL
MICRO URNS: ABNORMAL
MUCOUS THREADS URNS QL MICRO: PRESENT
NITRITE UR QL STRIP: NEGATIVE
PH UR STRIP: 6.5 [PH] (ref 5–7.5)
POTASSIUM SERPL-SCNC: 4.9 MMOL/L (ref 3.5–5.2)
PROT SERPL-MCNC: 7.2 G/DL (ref 6–8.5)
PROT UR QL STRIP: NEGATIVE
RBC #/AREA URNS HPF: ABNORMAL /HPF (ref 0–2)
SODIUM SERPL-SCNC: 134 MMOL/L (ref 134–144)
SP GR UR: 1.02 (ref 1–1.03)
TRIGL SERPL-MCNC: 82 MG/DL (ref 0–149)
UROBILINOGEN UR STRIP-MCNC: 0.2 MG/DL (ref 0.2–1)
VLDLC SERPL CALC-MCNC: 16 MG/DL (ref 5–40)
WBC #/AREA URNS HPF: ABNORMAL /HPF (ref 0–5)
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
## 2020-08-17 ENCOUNTER — OFFICE VISIT (OUTPATIENT)
Dept: CARDIOLOGY | Facility: CLINIC | Age: 70
End: 2020-08-17

## 2020-08-17 VITALS
TEMPERATURE: 97.8 F | OXYGEN SATURATION: 98 % | HEART RATE: 88 BPM | WEIGHT: 132 LBS | HEIGHT: 65 IN | SYSTOLIC BLOOD PRESSURE: 110 MMHG | DIASTOLIC BLOOD PRESSURE: 60 MMHG | BODY MASS INDEX: 21.99 KG/M2

## 2020-08-17 DIAGNOSIS — I48.0 PAROXYSMAL ATRIAL FIBRILLATION (HCC): Primary | ICD-10-CM

## 2020-08-17 PROCEDURE — 93000 ELECTROCARDIOGRAM COMPLETE: CPT | Performed by: INTERNAL MEDICINE

## 2020-08-17 PROCEDURE — 99214 OFFICE O/P EST MOD 30 MIN: CPT | Performed by: INTERNAL MEDICINE

## 2020-08-17 RX ORDER — CHLORAL HYDRATE 500 MG
CAPSULE ORAL
COMMUNITY
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
## 2020-09-25 ENCOUNTER — OFFICE VISIT (OUTPATIENT)
Dept: FAMILY MEDICINE CLINIC | Facility: CLINIC | Age: 70
End: 2020-09-25

## 2020-09-25 VITALS
OXYGEN SATURATION: 98 % | SYSTOLIC BLOOD PRESSURE: 142 MMHG | HEART RATE: 98 BPM | HEIGHT: 65 IN | WEIGHT: 132 LBS | DIASTOLIC BLOOD PRESSURE: 64 MMHG | BODY MASS INDEX: 21.99 KG/M2

## 2020-09-25 DIAGNOSIS — R50.9 LOW GRADE FEVER: ICD-10-CM

## 2020-09-25 DIAGNOSIS — J01.00 ACUTE NON-RECURRENT MAXILLARY SINUSITIS: ICD-10-CM

## 2020-09-25 DIAGNOSIS — I48.0 PAROXYSMAL ATRIAL FIBRILLATION (HCC): ICD-10-CM

## 2020-09-25 DIAGNOSIS — R05.9 COUGH: Primary | ICD-10-CM

## 2020-09-25 PROCEDURE — U0003 INFECTIOUS AGENT DETECTION BY NUCLEIC ACID (DNA OR RNA); SEVERE ACUTE RESPIRATORY SYNDROME CORONAVIRUS 2 (SARS-COV-2) (CORONAVIRUS DISEASE [COVID-19]), AMPLIFIED PROBE TECHNIQUE, MAKING USE OF HIGH THROUGHPUT TECHNOLOGIES AS DESCRIBED BY CMS-2020-01-R: HCPCS | Performed by: INTERNAL MEDICINE

## 2020-09-25 PROCEDURE — 99214 OFFICE O/P EST MOD 30 MIN: CPT | Performed by: INTERNAL MEDICINE

## 2020-09-25 RX ORDER — AZITHROMYCIN 250 MG/1
250 TABLET, FILM COATED ORAL DAILY
Qty: 6 TABLET | Refills: 0 | Status: SHIPPED | OUTPATIENT
Start: 2020-09-25 | End: 2020-12-14
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
## 2020-09-29 ENCOUNTER — TELEPHONE (OUTPATIENT)
Dept: FAMILY MEDICINE CLINIC | Facility: CLINIC | Age: 70
End: 2020-09-29

## 2020-10-26 ENCOUNTER — OFFICE VISIT (OUTPATIENT)
Dept: CARDIOLOGY | Facility: CLINIC | Age: 70
End: 2020-10-26

## 2020-10-26 VITALS
BODY MASS INDEX: 21.99 KG/M2 | WEIGHT: 132 LBS | HEART RATE: 89 BPM | DIASTOLIC BLOOD PRESSURE: 68 MMHG | SYSTOLIC BLOOD PRESSURE: 138 MMHG | HEIGHT: 65 IN

## 2020-10-26 DIAGNOSIS — I48.0 PAF (PAROXYSMAL ATRIAL FIBRILLATION) (HCC): Primary | ICD-10-CM

## 2020-10-26 PROCEDURE — 99214 OFFICE O/P EST MOD 30 MIN: CPT | Performed by: INTERNAL MEDICINE

## 2020-10-26 PROCEDURE — 93000 ELECTROCARDIOGRAM COMPLETE: CPT | Performed by: INTERNAL MEDICINE
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
## 2020-11-10 ENCOUNTER — HOSPITAL ENCOUNTER (OUTPATIENT)
Dept: MAMMOGRAPHY | Facility: HOSPITAL | Age: 70
Discharge: HOME | End: 2020-11-10
Admitting: FAMILY MEDICINE

## 2020-11-10 DIAGNOSIS — Z12.31 BREAST CANCER SCREENING BY MAMMOGRAM: ICD-10-CM

## 2020-11-10 PROCEDURE — 77067 SCR MAMMO BI INCL CAD: CPT

## 2020-11-10 PROCEDURE — 77063 BREAST TOMOSYNTHESIS BI: CPT

## 2020-11-10 PROCEDURE — 77067 SCR MAMMO BI INCL CAD: CPT | Performed by: RADIOLOGY

## 2020-11-10 PROCEDURE — 77063 BREAST TOMOSYNTHESIS BI: CPT | Performed by: RADIOLOGY

## 2020-12-14 ENCOUNTER — OFFICE VISIT (OUTPATIENT)
Dept: CARDIOLOGY | Facility: CLINIC | Age: 70
End: 2020-12-14

## 2020-12-14 VITALS
BODY MASS INDEX: 21.85 KG/M2 | HEIGHT: 64 IN | TEMPERATURE: 96.9 F | SYSTOLIC BLOOD PRESSURE: 136 MMHG | HEART RATE: 95 BPM | DIASTOLIC BLOOD PRESSURE: 70 MMHG | WEIGHT: 128 LBS

## 2020-12-14 DIAGNOSIS — E78.2 MIXED HYPERLIPIDEMIA: ICD-10-CM

## 2020-12-14 DIAGNOSIS — I48.0 PAROXYSMAL ATRIAL FIBRILLATION (HCC): Primary | ICD-10-CM

## 2020-12-14 PROCEDURE — 99213 OFFICE O/P EST LOW 20 MIN: CPT | Performed by: INTERNAL MEDICINE

## 2020-12-14 NOTE — PROGRESS NOTES
"[facility] at [facility]  Office visit  Mar Joseph  1950    [phone] (work)    VISIT DATE:  12/14/2020    PCP: Anand Pineda DO  [address] [facility] [address]    CC:  Chief Complaint   Patient presents with   • Atrial Fibrillation       Previous cardiac studies and procedures:  October 2019  Transesophageal echocardiogram with successful cardioversion  · Left ventricular systolic function is normal. Estimated EF = 60%.  · Mild to moderate biatrial enlargement.  · No evidence of intracardiac thrombus or mass, clear left atrial appendage.  · No significant valvular disease.    June 2020 A. fib ablation.    ASSESSMENT:   Diagnosis Plan   1. Paroxysmal atrial fibrillation (CMS/HCC)     2. Mixed hyperlipidemia         PLAN:  Continue Eliquis 5 mg p.o. twice daily  Restarting sotalol 80 mg p.o. twice daily, EKG in 48 to 72 hours.  Follow-up with EP next month.    Subjective  History of paroxysmal atrial fibrillation, initial episode in 2013 with recurrence last fall.  She reports going out of rhythm approximately 2 days ago.  Reports that she is still having palpitations consistent with atrial fibrillation about once per week which will last for a few minutes.  No obvious triggers.  No alleviating or exacerbating factors.  She denies easy bruising or bleeding complications on Eliquis.  Maintaining an active lifestyle with regular exercise.    PHYSICAL EXAMINATION:  Vitals:    12/14/20 1323   BP: 136/70   BP Location: Left arm   Patient Position: Sitting   Pulse: 95   Temp: 96.9 °F (36.1 °C)   Weight: 58.1 kg (128 lb)   Height: 162.6 cm (64\")     General Appearance:    Alert, cooperative, no distress, appears stated age   Head:    Normocephalic, without obvious abnormality, atraumatic   Eyes:    conjunctiva/corneas clear   Nose:   Nares normal, septum midline, mucosa normal, no drainage   Throat:   Lips, teeth and gums normal   Neck:   Supple, symmetrical, trachea midline, " no carotid    bruit or JVD   Lungs:     Clear to auscultation bilaterally, respirations unlabored   Chest Wall:    No tenderness or deformity    Heart:   Irregularly irregular, S1 and S2 normal, no murmur, rub   or gallop, normal carotid impulse bilaterally without bruit.   Abdomen:     Soft, non-tender   Extremities:   Extremities normal, atraumatic, no cyanosis or edema   Pulses:   2+ and symmetric all extremities   Skin:   Skin color, texture, turgor normal, no rashes or lesions       Diagnostic Data:  Procedures  Lab Results   Component Value Date    CHLPL 263 (H) 07/21/2020    TRIG 82 07/21/2020     07/21/2020     Lab Results   Component Value Date    GLUCOSE 114 (H) 06/24/2020    BUN 18 07/21/2020    CREATININE 0.45 (L) 07/21/2020     07/21/2020    K 4.9 07/21/2020    CL 95 (L) 07/21/2020    CO2 25 07/21/2020     Lab Results   Component Value Date    HGBA1C 5.6 07/21/2020     Lab Results   Component Value Date    WBC 8.85 06/24/2020    HGB 14.3 06/24/2020    HCT 41.5 06/24/2020     06/24/2020       Allergies  Allergies   Allergen Reactions   • Penicillins Hives   • Sulfa Antibiotics Nausea Only     nausea       Current Medications    Current Outpatient Medications:   •  acetaminophen (TYLENOL) 500 MG tablet, Take 500 mg by mouth Every 6 (Six) Hours As Needed for Mild Pain ., Disp: , Rfl:   •  apixaban (Eliquis) 5 MG tablet tablet, Take 1 tablet by mouth 2 (Two) Times a Day., Disp: 180 tablet, Rfl: 1  •  bimatoprost (LUMIGAN) 0.01 % ophthalmic drops, Administer 1 drop to both eyes Every Night., Disp: , Rfl:   •  diphenhydrAMINE (BENADRYL) 25 mg capsule, Take 25 mg by mouth Every 6 (Six) Hours As Needed for Sleep., Disp: , Rfl:   •  escitalopram (LEXAPRO) 10 MG tablet, Take 1 tablet by mouth Daily., Disp: 90 tablet, Rfl: 3  •  Omega-3 Fatty Acids (FISH OIL) 1000 MG capsule capsule, Take  by mouth Daily With Breakfast., Disp: , Rfl:   •  traMADol (ULTRAM) 50 MG tablet, Take 50 mg by mouth  Every 12 (Twelve) Hours As Needed for Moderate Pain ., Disp: , Rfl:   •  VITAMIN D PO, Vitamin D, Disp: , Rfl:   •  Sotalol HCl AF 80 MG tablet, Take 1 tablet by mouth 2 (Two) Times a Day., Disp: 60 tablet, Rfl: 11          ROS  Review of Systems   Constitution: Positive for malaise/fatigue.   Cardiovascular: Positive for irregular heartbeat and palpitations.   Respiratory: Negative for cough and shortness of breath.        SOCIAL HX  Social History     Socioeconomic History   • Marital status:      Spouse name: Not on file   • Number of children: Not on file   • Years of education: Not on file   • Highest education level: Not on file   Tobacco Use   • Smoking status: Current Every Day Smoker     Packs/day: 0.25     Years: 45.00     Pack years: 11.25     Types: Cigarettes   • Smokeless tobacco: Never Used   Substance and Sexual Activity   • Alcohol use: Yes     Alcohol/week: 2.0 standard drinks     Types: 1 Glasses of wine, 1 Cans of beer per week     Frequency: Monthly or less     Drinks per session: 1 or 2     Comment: 1-2 daily   • Drug use: No   • Sexual activity: Defer   Social History Narrative    Caffeine: 1 decaf recently       FAMILY HX  Family History   Problem Relation Age of Onset   • Asthma Mother    • No Known Problems Father    • Heart disease Sister    • Cancer Brother    • No Known Problems Maternal Grandmother    • No Known Problems Maternal Grandfather    • No Known Problems Paternal Grandmother    • No Known Problems Paternal Grandfather    • No Known Problems Brother    • Heart disease Other    • Heart attack Other    • Diabetes Other              Kirk Ocasio III, MD, [facility]

## 2021-03-29 DIAGNOSIS — F41.9 ANXIETY: ICD-10-CM

## 2021-03-29 RX ORDER — ESCITALOPRAM OXALATE 10 MG/1
TABLET ORAL
Qty: 90 TABLET | Refills: 3 | Status: SHIPPED | OUTPATIENT
Start: 2021-03-29 | End: 2022-02-24

## 2021-04-06 ENCOUNTER — OFFICE VISIT (OUTPATIENT)
Dept: CARDIOLOGY | Facility: CLINIC | Age: 71
End: 2021-04-06

## 2021-04-06 VITALS
WEIGHT: 131 LBS | OXYGEN SATURATION: 98 % | TEMPERATURE: 96.2 F | SYSTOLIC BLOOD PRESSURE: 122 MMHG | BODY MASS INDEX: 22.36 KG/M2 | HEART RATE: 59 BPM | DIASTOLIC BLOOD PRESSURE: 60 MMHG | HEIGHT: 64 IN

## 2021-04-06 DIAGNOSIS — R00.2 PALPITATION: ICD-10-CM

## 2021-04-06 DIAGNOSIS — I48.0 PAROXYSMAL ATRIAL FIBRILLATION (HCC): ICD-10-CM

## 2021-04-06 DIAGNOSIS — E78.2 MIXED HYPERLIPIDEMIA: ICD-10-CM

## 2021-04-06 DIAGNOSIS — Z72.0 TOBACCO USE: Primary | ICD-10-CM

## 2021-04-06 PROCEDURE — 93000 ELECTROCARDIOGRAM COMPLETE: CPT | Performed by: PHYSICIAN ASSISTANT

## 2021-04-06 PROCEDURE — 99214 OFFICE O/P EST MOD 30 MIN: CPT | Performed by: PHYSICIAN ASSISTANT
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
## 2021-05-18 ENCOUNTER — OFFICE VISIT (OUTPATIENT)
Dept: FAMILY MEDICINE CLINIC | Facility: CLINIC | Age: 71
End: 2021-05-18

## 2021-05-18 VITALS
OXYGEN SATURATION: 96 % | HEIGHT: 64 IN | SYSTOLIC BLOOD PRESSURE: 124 MMHG | BODY MASS INDEX: 21.85 KG/M2 | DIASTOLIC BLOOD PRESSURE: 68 MMHG | WEIGHT: 128 LBS | HEART RATE: 67 BPM

## 2021-05-18 DIAGNOSIS — J40 BRONCHITIS: ICD-10-CM

## 2021-05-18 DIAGNOSIS — J32.0 MAXILLARY SINUSITIS, UNSPECIFIED CHRONICITY: Primary | ICD-10-CM

## 2021-05-18 PROCEDURE — U0004 COV-19 TEST NON-CDC HGH THRU: HCPCS | Performed by: INTERNAL MEDICINE

## 2021-05-18 PROCEDURE — 99213 OFFICE O/P EST LOW 20 MIN: CPT | Performed by: INTERNAL MEDICINE

## 2021-05-18 RX ORDER — DOXYCYCLINE 100 MG/1
100 CAPSULE ORAL 2 TIMES DAILY
Qty: 14 CAPSULE | Refills: 0 | Status: SHIPPED | OUTPATIENT
Start: 2021-05-18 | End: 2021-05-25
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
## 2021-05-19 LAB — SARS-COV-2 RNA NOSE QL NAA+PROBE: NOT DETECTED

## 2021-06-28 ENCOUNTER — OFFICE VISIT (OUTPATIENT)
Dept: CARDIOLOGY | Facility: CLINIC | Age: 71
End: 2021-06-28

## 2021-06-28 VITALS
BODY MASS INDEX: 22.36 KG/M2 | HEIGHT: 64 IN | OXYGEN SATURATION: 98 % | WEIGHT: 131 LBS | SYSTOLIC BLOOD PRESSURE: 128 MMHG | DIASTOLIC BLOOD PRESSURE: 56 MMHG | HEART RATE: 63 BPM

## 2021-06-28 DIAGNOSIS — I48.0 PAROXYSMAL ATRIAL FIBRILLATION (HCC): Primary | ICD-10-CM

## 2021-06-28 DIAGNOSIS — I48.19 ATRIAL FIBRILLATION, PERSISTENT (HCC): ICD-10-CM

## 2021-06-28 PROCEDURE — 99213 OFFICE O/P EST LOW 20 MIN: CPT | Performed by: INTERNAL MEDICINE

## 2021-06-28 RX ORDER — SOTALOL HYDROCHLORIDE 80 MG/1
40 TABLET ORAL DAILY
COMMUNITY
End: 2022-01-03 | Stop reason: SDUPTHER
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
## 2021-06-29 ENCOUNTER — OFFICE VISIT (OUTPATIENT)
Dept: FAMILY MEDICINE CLINIC | Facility: CLINIC | Age: 71
End: 2021-06-29

## 2021-06-29 VITALS
WEIGHT: 129 LBS | OXYGEN SATURATION: 98 % | HEART RATE: 72 BPM | SYSTOLIC BLOOD PRESSURE: 124 MMHG | DIASTOLIC BLOOD PRESSURE: 76 MMHG | HEIGHT: 64 IN | BODY MASS INDEX: 22.02 KG/M2

## 2021-06-29 DIAGNOSIS — Z00.00 PREVENTATIVE HEALTH CARE: ICD-10-CM

## 2021-06-29 DIAGNOSIS — R73.01 FASTING HYPERGLYCEMIA: ICD-10-CM

## 2021-06-29 DIAGNOSIS — Z00.00 MEDICARE ANNUAL WELLNESS VISIT, SUBSEQUENT: Primary | ICD-10-CM

## 2021-06-29 DIAGNOSIS — F41.9 ANXIETY: ICD-10-CM

## 2021-06-29 DIAGNOSIS — Z13.0 SCREENING FOR DEFICIENCY ANEMIA: ICD-10-CM

## 2021-06-29 DIAGNOSIS — E78.2 MIXED HYPERLIPIDEMIA: ICD-10-CM

## 2021-06-29 PROCEDURE — 1125F AMNT PAIN NOTED PAIN PRSNT: CPT | Performed by: FAMILY MEDICINE

## 2021-06-29 PROCEDURE — 99397 PER PM REEVAL EST PAT 65+ YR: CPT | Performed by: FAMILY MEDICINE

## 2021-06-29 PROCEDURE — 1160F RVW MEDS BY RX/DR IN RCRD: CPT | Performed by: FAMILY MEDICINE

## 2021-06-29 PROCEDURE — G0439 PPPS, SUBSEQ VISIT: HCPCS | Performed by: FAMILY MEDICINE

## 2021-06-29 PROCEDURE — 96160 PT-FOCUSED HLTH RISK ASSMT: CPT | Performed by: FAMILY MEDICINE

## 2021-06-29 PROCEDURE — 1170F FXNL STATUS ASSESSED: CPT | Performed by: FAMILY MEDICINE

## 2021-06-29 RX ORDER — CLINDAMYCIN HYDROCHLORIDE 300 MG/1
300 CAPSULE ORAL 3 TIMES DAILY
Qty: 21 CAPSULE | Refills: 0 | Status: SHIPPED | OUTPATIENT
Start: 2021-06-29 | End: 2021-07-06

## 2021-07-28 ENCOUNTER — LAB (OUTPATIENT)
Dept: LAB | Facility: HOSPITAL | Age: 71
End: 2021-07-28

## 2021-07-28 DIAGNOSIS — Z00.00 ROUTINE GENERAL MEDICAL EXAMINATION AT A HEALTH CARE FACILITY: Primary | ICD-10-CM

## 2021-07-28 DIAGNOSIS — F41.9 ANXIETY: ICD-10-CM

## 2021-07-28 DIAGNOSIS — Z13.0 SCREENING FOR DEFICIENCY ANEMIA: ICD-10-CM

## 2021-07-28 DIAGNOSIS — E78.2 MIXED HYPERLIPIDEMIA: ICD-10-CM

## 2021-07-28 DIAGNOSIS — R73.01 FASTING HYPERGLYCEMIA: ICD-10-CM

## 2021-07-28 LAB
ALBUMIN SERPL-MCNC: 4.7 G/DL (ref 3.5–5.2)
ALBUMIN/GLOB SERPL: 2.2 G/DL
ALP SERPL-CCNC: 74 U/L (ref 39–117)
ALT SERPL W P-5'-P-CCNC: 15 U/L (ref 1–33)
ANION GAP SERPL CALCULATED.3IONS-SCNC: 10.9 MMOL/L (ref 5–15)
AST SERPL-CCNC: 19 U/L (ref 1–32)
BILIRUB SERPL-MCNC: 0.4 MG/DL (ref 0–1.2)
BUN SERPL-MCNC: 10 MG/DL (ref 8–23)
BUN/CREAT SERPL: 20.4 (ref 7–25)
CALCIUM SPEC-SCNC: 9.5 MG/DL (ref 8.6–10.5)
CHLORIDE SERPL-SCNC: 101 MMOL/L (ref 98–107)
CHOLEST SERPL-MCNC: 261 MG/DL (ref 0–200)
CO2 SERPL-SCNC: 25.1 MMOL/L (ref 22–29)
CREAT SERPL-MCNC: 0.49 MG/DL (ref 0.57–1)
DEPRECATED RDW RBC AUTO: 40.1 FL (ref 37–54)
ERYTHROCYTE [DISTWIDTH] IN BLOOD BY AUTOMATED COUNT: 11.6 % (ref 12.3–15.4)
GFR SERPL CREATININE-BSD FRML MDRD: 125 ML/MIN/1.73
GLOBULIN UR ELPH-MCNC: 2.1 GM/DL
GLUCOSE SERPL-MCNC: 98 MG/DL (ref 65–99)
HBA1C MFR BLD: 5.3 % (ref 4.8–5.6)
HCT VFR BLD AUTO: 38.5 % (ref 34–46.6)
HDLC SERPL-MCNC: 110 MG/DL (ref 40–60)
HGB BLD-MCNC: 13.3 G/DL (ref 12–15.9)
LDLC SERPL CALC-MCNC: 143 MG/DL (ref 0–100)
LDLC/HDLC SERPL: 1.28 {RATIO}
MCH RBC QN AUTO: 32.9 PG (ref 26.6–33)
MCHC RBC AUTO-ENTMCNC: 34.5 G/DL (ref 31.5–35.7)
MCV RBC AUTO: 95.3 FL (ref 79–97)
PLATELET # BLD AUTO: 285 10*3/MM3 (ref 140–450)
PMV BLD AUTO: 11.6 FL (ref 6–12)
POTASSIUM SERPL-SCNC: 4.6 MMOL/L (ref 3.5–5.2)
PROT SERPL-MCNC: 6.8 G/DL (ref 6–8.5)
RBC # BLD AUTO: 4.04 10*6/MM3 (ref 3.77–5.28)
SODIUM SERPL-SCNC: 137 MMOL/L (ref 136–145)
TRIGL SERPL-MCNC: 53 MG/DL (ref 0–150)
TSH SERPL DL<=0.05 MIU/L-ACNC: 1.35 UIU/ML (ref 0.27–4.2)
VLDLC SERPL-MCNC: 8 MG/DL (ref 5–40)
WBC # BLD AUTO: 6.5 10*3/MM3 (ref 3.4–10.8)

## 2021-07-28 PROCEDURE — 80061 LIPID PANEL: CPT

## 2021-07-28 PROCEDURE — 36415 COLL VENOUS BLD VENIPUNCTURE: CPT

## 2021-07-28 PROCEDURE — 84443 ASSAY THYROID STIM HORMONE: CPT

## 2021-07-28 PROCEDURE — 83036 HEMOGLOBIN GLYCOSYLATED A1C: CPT

## 2021-07-28 PROCEDURE — 81003 URINALYSIS AUTO W/O SCOPE: CPT

## 2021-07-28 PROCEDURE — 80053 COMPREHEN METABOLIC PANEL: CPT

## 2021-07-28 PROCEDURE — 85027 COMPLETE CBC AUTOMATED: CPT

## 2021-07-29 LAB
APPEARANCE UR: CLEAR
BACTERIA #/AREA URNS HPF: NORMAL /[HPF]
BILIRUB UR QL STRIP: NEGATIVE
CASTS URNS QL MICRO: NORMAL /LPF
COLOR UR: YELLOW
EPI CELLS #/AREA URNS HPF: NORMAL /HPF (ref 0–10)
GLUCOSE UR QL: NEGATIVE
HGB UR QL STRIP: NEGATIVE
KETONES UR QL STRIP: NEGATIVE
LEUKOCYTE ESTERASE UR QL STRIP: ABNORMAL
MICRO URNS: ABNORMAL
NITRITE UR QL STRIP: NEGATIVE
PH UR STRIP: 6 [PH] (ref 5–7.5)
PROT UR QL STRIP: NEGATIVE
RBC #/AREA URNS HPF: NORMAL /HPF (ref 0–2)
SP GR UR: 1.01 (ref 1–1.03)
UROBILINOGEN UR STRIP-MCNC: 0.2 MG/DL (ref 0.2–1)
WBC #/AREA URNS HPF: NORMAL /HPF (ref 0–5)

## 2022-01-03 ENCOUNTER — OFFICE VISIT (OUTPATIENT)
Dept: CARDIOLOGY | Facility: CLINIC | Age: 72
End: 2022-01-03

## 2022-01-03 VITALS
HEIGHT: 65 IN | SYSTOLIC BLOOD PRESSURE: 122 MMHG | WEIGHT: 127.2 LBS | BODY MASS INDEX: 21.19 KG/M2 | DIASTOLIC BLOOD PRESSURE: 72 MMHG | OXYGEN SATURATION: 97 % | HEART RATE: 65 BPM

## 2022-01-03 DIAGNOSIS — E78.2 MIXED HYPERLIPIDEMIA: ICD-10-CM

## 2022-01-03 DIAGNOSIS — I48.19 ATRIAL FIBRILLATION, PERSISTENT: Primary | ICD-10-CM

## 2022-01-03 DIAGNOSIS — I48.0 PAROXYSMAL ATRIAL FIBRILLATION: ICD-10-CM

## 2022-01-03 PROCEDURE — 93000 ELECTROCARDIOGRAM COMPLETE: CPT | Performed by: INTERNAL MEDICINE

## 2022-01-03 PROCEDURE — 99213 OFFICE O/P EST LOW 20 MIN: CPT | Performed by: INTERNAL MEDICINE

## 2022-01-03 RX ORDER — SOTALOL HYDROCHLORIDE 80 MG/1
40 TABLET ORAL DAILY
Qty: 45 TABLET | Refills: 3 | Status: SHIPPED | OUTPATIENT
Start: 2022-01-03 | End: 2022-08-02

## 2022-01-03 NOTE — PROGRESS NOTES
"[facility] at [facility]  Office visit  Mar Joseph  1950    [phone] (work)    VISIT DATE:  1/3/2022    PCP: Anand Pineda DO  [id number] [facility] [address]    CC:  Chief Complaint   Patient presents with   • Paroxysmal atrial fibrillation       Previous cardiac studies and procedures:  October 2019  Transesophageal echocardiogram with successful cardioversion  · Left ventricular systolic function is normal. Estimated EF = 60%.  · Mild to moderate biatrial enlargement.  · No evidence of intracardiac thrombus or mass, clear left atrial appendage.  · No significant valvular disease.    June 2020 A. fib ablation.    ASSESSMENT:   Diagnosis Plan   1. Atrial fibrillation, persistent (HCC)     2. Mixed hyperlipidemia     3. Paroxysmal atrial fibrillation (HCC)         PLAN:  Continue Eliquis 5 mg p.o. twice daily  Continue sotalol 40 mg p.o. daily  We will arrange for ambulatory ECG monitor if intermittent palpitations persist.    Subjective  History of paroxysmal atrial fibrillation, initial episode in 2013.  Maintaining active lifestyle.  No recurrent symptoms concerning for atrial fibrillation since her ablation.  She has had intermittent brief tachypalpitations lasting for 10 to 15 minutes which were triggered by emotional stress related to the passing of a close friend due to Covid back in late November and losing her sister-in-law to end-stage dementia and early to mid December.  Symptoms are gradually improving.    PHYSICAL EXAMINATION:  Vitals:    01/03/22 1117   Pulse: 65   SpO2: 97%   Weight: 57.7 kg (127 lb 3.2 oz)   Height: 163.8 cm (64.5\")     General Appearance:    Alert, cooperative, no distress, appears stated age   Head:    Normocephalic, without obvious abnormality, atraumatic   Eyes:    conjunctiva/corneas clear   Nose:   Nares normal, septum midline, mucosa normal, no drainage   Throat:   Lips, teeth and gums normal   Neck:   Supple, symmetrical, trachea " midline, no carotid    bruit or JVD   Lungs:     Clear to auscultation bilaterally, respirations unlabored   Chest Wall:    No tenderness or deformity    Heart:   Irregularly irregular, S1 and S2 normal, no murmur, rub   or gallop, normal carotid impulse bilaterally without bruit.   Abdomen:     Soft, non-tender   Extremities:   Extremities normal, atraumatic, no cyanosis or edema   Pulses:   2+ and symmetric all extremities   Skin:   Skin color, texture, turgor normal, no rashes or lesions       Diagnostic Data:    ECG 12 Lead    Date/Time: 1/3/2022 11:31 AM  Performed by: Kirk Ocasio III, MD  Authorized by: Kirk Ocasio III, MD   Comparison: compared with previous ECG from 6/28/2021  Similar to previous ECG  Rhythm: sinus rhythm    Clinical impression: normal ECG          Lab Results   Component Value Date    CHLPL 263 (H) 07/21/2020    TRIG 53 07/28/2021     (H) 07/28/2021     Lab Results   Component Value Date    GLUCOSE 98 07/28/2021    BUN 10 07/28/2021    CREATININE 0.49 (L) 07/28/2021     07/28/2021    K 4.6 07/28/2021     07/28/2021    CO2 25.1 07/28/2021     Lab Results   Component Value Date    HGBA1C 5.30 07/28/2021     Lab Results   Component Value Date    WBC 6.50 07/28/2021    HGB 13.3 07/28/2021    HCT 38.5 07/28/2021     07/28/2021       Allergies  Allergies   Allergen Reactions   • Hydrocodone-Acetaminophen Nausea Only   • Hydrocodone GI Intolerance   • Moxifloxacin Unknown (See Comments)   • Penicillins Hives   • Sulfa Antibiotics Nausea Only     nausea       Current Medications    Current Outpatient Medications:   •  acetaminophen (TYLENOL) 500 MG tablet, Take 500 mg by mouth Every 6 (Six) Hours As Needed for Mild Pain ., Disp: , Rfl:   •  apixaban (Eliquis) 5 MG tablet tablet, Take 1 tablet by mouth 2 (Two) Times a Day., Disp: 60 tablet, Rfl: 5  •  bimatoprost (LUMIGAN) 0.01 % ophthalmic drops, Administer 1 drop to both eyes Every Night., Disp: , Rfl:   •  Diclofenac  Sodium (VOLTAREN) 1 % gel gel, As Needed., Disp: , Rfl:   •  diphenhydrAMINE (BENADRYL) 25 mg capsule, Take 25 mg by mouth Every 6 (Six) Hours As Needed for Sleep., Disp: , Rfl:   •  escitalopram (LEXAPRO) 10 MG tablet, TAKE 1 TABLET EVERY DAY, Disp: 90 tablet, Rfl: 3  •  Omega-3 Fatty Acids (FISH OIL) 1000 MG capsule capsule, Take  by mouth Daily With Breakfast., Disp: , Rfl:   •  sotalol (BETAPACE) 80 MG tablet, Take 40 mg by mouth Daily., Disp: , Rfl:   •  traMADol (ULTRAM) 50 MG tablet, Take 50 mg by mouth As Needed for Moderate Pain ., Disp: , Rfl:   •  VITAMIN D PO, Daily., Disp: , Rfl:           ROS  Review of Systems   Constitutional: Positive for malaise/fatigue.   Cardiovascular: Positive for irregular heartbeat and palpitations.   Respiratory: Negative for cough and shortness of breath.        SOCIAL HX  Social History     Socioeconomic History   • Marital status:    Tobacco Use   • Smoking status: Current Every Day Smoker     Packs/day: 0.25     Years: 45.00     Pack years: 11.25     Types: Cigarettes   • Smokeless tobacco: Never Used   Substance and Sexual Activity   • Alcohol use: Yes     Alcohol/week: 2.0 standard drinks     Types: 1 Glasses of wine, 1 Cans of beer per week   • Drug use: No   • Sexual activity: Defer       FAMILY HX  Family History   Problem Relation Age of Onset   • Asthma Mother    • No Known Problems Father    • Heart disease Sister    • Cancer Brother    • No Known Problems Maternal Grandmother    • No Known Problems Maternal Grandfather    • No Known Problems Paternal Grandmother    • No Known Problems Paternal Grandfather    • No Known Problems Brother    • Heart disease Other    • Heart attack Other    • Diabetes Other              Kirk Ocasio III, MD, [facility]

## 2022-02-24 DIAGNOSIS — F41.9 ANXIETY: ICD-10-CM

## 2022-02-24 RX ORDER — ESCITALOPRAM OXALATE 10 MG/1
TABLET ORAL
Qty: 90 TABLET | Refills: 3 | Status: SHIPPED | OUTPATIENT
Start: 2022-02-24 | End: 2022-09-20 | Stop reason: SDUPTHER

## 2022-02-24 NOTE — TELEPHONE ENCOUNTER
Rx Refill Note  Requested Prescriptions     Pending Prescriptions Disp Refills   • escitalopram (LEXAPRO) 10 MG tablet [Pharmacy Med Name: ESCITALOPRAM OXALATE 10 MG Tablet] 90 tablet 3     Sig: TAKE 1 TABLET EVERY DAY      Last office visit with prescribing clinician: 6/29/2021      Next office visit with prescribing clinician: 7/1/2022            Shasha Addison MA  02/24/22, 12:34 EST

## 2022-06-13 ENCOUNTER — OFFICE VISIT (OUTPATIENT)
Dept: FAMILY MEDICINE CLINIC | Facility: CLINIC | Age: 72
End: 2022-06-13

## 2022-06-13 VITALS
BODY MASS INDEX: 20.9 KG/M2 | DIASTOLIC BLOOD PRESSURE: 80 MMHG | OXYGEN SATURATION: 97 % | HEIGHT: 64 IN | SYSTOLIC BLOOD PRESSURE: 122 MMHG | HEART RATE: 69 BPM | WEIGHT: 122.4 LBS

## 2022-06-13 DIAGNOSIS — G89.29 CHRONIC BILATERAL LOW BACK PAIN WITH BILATERAL SCIATICA: ICD-10-CM

## 2022-06-13 DIAGNOSIS — M25.551 RIGHT HIP PAIN: ICD-10-CM

## 2022-06-13 DIAGNOSIS — M47.816 LUMBAR SPONDYLOSIS: Primary | ICD-10-CM

## 2022-06-13 DIAGNOSIS — M54.42 CHRONIC BILATERAL LOW BACK PAIN WITH BILATERAL SCIATICA: ICD-10-CM

## 2022-06-13 DIAGNOSIS — M54.41 CHRONIC BILATERAL LOW BACK PAIN WITH BILATERAL SCIATICA: ICD-10-CM

## 2022-06-13 PROCEDURE — 99213 OFFICE O/P EST LOW 20 MIN: CPT | Performed by: FAMILY MEDICINE
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
## 2022-07-11 ENCOUNTER — OFFICE VISIT (OUTPATIENT)
Dept: CARDIOLOGY | Facility: CLINIC | Age: 72
End: 2022-07-11

## 2022-07-11 VITALS
OXYGEN SATURATION: 98 % | WEIGHT: 122.4 LBS | HEIGHT: 64 IN | BODY MASS INDEX: 20.9 KG/M2 | HEART RATE: 79 BPM | DIASTOLIC BLOOD PRESSURE: 70 MMHG | SYSTOLIC BLOOD PRESSURE: 140 MMHG

## 2022-07-11 DIAGNOSIS — I48.19 ATRIAL FIBRILLATION, PERSISTENT: Primary | ICD-10-CM

## 2022-07-11 DIAGNOSIS — I48.0 PAROXYSMAL ATRIAL FIBRILLATION: ICD-10-CM

## 2022-07-11 DIAGNOSIS — Z79.01 CHRONIC ANTICOAGULATION: ICD-10-CM

## 2022-07-11 DIAGNOSIS — Z79.899 LONG TERM CURRENT USE OF ANTIARRHYTHMIC DRUG: ICD-10-CM

## 2022-07-11 PROCEDURE — 93000 ELECTROCARDIOGRAM COMPLETE: CPT | Performed by: INTERNAL MEDICINE

## 2022-07-11 PROCEDURE — 99214 OFFICE O/P EST MOD 30 MIN: CPT | Performed by: INTERNAL MEDICINE
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
## 2022-08-01 ENCOUNTER — HOSPITAL ENCOUNTER (OUTPATIENT)
Dept: MRI IMAGING | Facility: HOSPITAL | Age: 72
Discharge: HOME | End: 2022-08-01
Admitting: FAMILY MEDICINE

## 2022-08-01 DIAGNOSIS — M54.41 CHRONIC BILATERAL LOW BACK PAIN WITH BILATERAL SCIATICA: ICD-10-CM

## 2022-08-01 DIAGNOSIS — M47.816 LUMBAR SPONDYLOSIS: ICD-10-CM

## 2022-08-01 DIAGNOSIS — M54.42 CHRONIC BILATERAL LOW BACK PAIN WITH BILATERAL SCIATICA: ICD-10-CM

## 2022-08-01 DIAGNOSIS — G89.29 CHRONIC BILATERAL LOW BACK PAIN WITH BILATERAL SCIATICA: ICD-10-CM

## 2022-08-01 PROCEDURE — 72148 MRI LUMBAR SPINE W/O DYE: CPT

## 2022-08-02 ENCOUNTER — TELEPHONE (OUTPATIENT)
Dept: FAMILY MEDICINE CLINIC | Facility: CLINIC | Age: 72
End: 2022-08-02

## 2022-08-02 RX ORDER — SOTALOL HYDROCHLORIDE 80 MG/1
TABLET ORAL
Qty: 45 TABLET | Refills: 3 | Status: SHIPPED | OUTPATIENT
Start: 2022-08-02

## 2022-08-02 NOTE — TELEPHONE ENCOUNTER
Contacted patient to notify. Verbalized understanding   I have forwarded MRI results to Neurosurgery for upcoming appt

## 2022-08-02 NOTE — TELEPHONE ENCOUNTER
Caller: Mar Joseph    Relationship to patient: Self    Best call back number: [phone]    Patient is needing: PATIENT STATED THAT SHE WENT TO GO GET MRI DONE YESTERDAY AND WANTED TO KNOW WHAT PROVIDER WOULD WANT TO DO NEXT    PLEASE ADVISE

## 2022-08-02 NOTE — TELEPHONE ENCOUNTER
MRI just showed severe degenerative arthritis, but no fractures or acute abnormalities of concern.  Neurosurgery appointment was pending completion of this, it may be in your work queue Sveta.  Please forward to them to get her scheduled, I would recommend neurosurgical evaluation and physical therapy.

## 2022-08-23 ENCOUNTER — OFFICE VISIT (OUTPATIENT)
Dept: NEUROSURGERY | Facility: CLINIC | Age: 72
End: 2022-08-23

## 2022-08-23 VITALS — TEMPERATURE: 97.5 F | WEIGHT: 119.6 LBS | BODY MASS INDEX: 20.42 KG/M2 | HEIGHT: 64 IN

## 2022-08-23 DIAGNOSIS — M48.062 SPINAL STENOSIS OF LUMBAR REGION WITH NEUROGENIC CLAUDICATION: Primary | ICD-10-CM

## 2022-08-23 DIAGNOSIS — M51.36 DDD (DEGENERATIVE DISC DISEASE), LUMBAR: ICD-10-CM

## 2022-08-23 PROCEDURE — 99203 OFFICE O/P NEW LOW 30 MIN: CPT | Performed by: NEUROLOGICAL SURGERY

## 2022-08-23 RX ORDER — TRAVOPROST OPHTHALMIC SOLUTION 0.04 MG/ML
SOLUTION OPHTHALMIC
COMMUNITY

## 2022-08-23 NOTE — PROGRESS NOTES
Patient: Mar Joseph  : 1950    Primary Care Provider: Anand Pineda DO    Requesting Provider: As above        History    Chief Complaint: Chronic low back pain.    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  In the past she is done acupuncture, physical therapy, epidural injections.  She was managed at [facility] [place] for a while.  Symptoms are better sitting and lying down.  She is worse with lifting, standing, walking.  When standing she prefers to flex forward at the waist.  Tramadol can be helpful.  She denies bowel or bladder dysfunction.    Review of Systems   Constitutional: Negative for activity change, appetite change, chills, diaphoresis, fatigue, fever and unexpected weight change.   HENT: Negative for congestion, dental problem, drooling, ear discharge, ear pain, facial swelling, hearing loss, mouth sores, nosebleeds, postnasal drip, rhinorrhea, sinus pressure, sinus pain, sneezing, sore throat, tinnitus, trouble swallowing and voice change.    Eyes: Negative for photophobia, pain, discharge, redness, itching and visual disturbance.   Respiratory: Negative for apnea, cough, choking, chest tightness, shortness of breath, wheezing and stridor.    Cardiovascular: Negative for chest pain, palpitations and leg swelling.   Gastrointestinal: Negative for abdominal distention, abdominal pain, anal bleeding, blood in stool, constipation, diarrhea, nausea, rectal pain and vomiting.   Endocrine: Negative for cold intolerance, heat intolerance, polydipsia, polyphagia and polyuria.   Genitourinary: Negative for decreased urine volume, difficulty urinating, dysuria, enuresis, flank pain, frequency, genital sores, hematuria and urgency.   Musculoskeletal: Positive for back pain. Negative for arthralgias, gait problem, joint swelling, myalgias, neck pain and neck stiffness.  "  Skin: Positive for wound. Negative for color change, pallor and rash.   Allergic/Immunologic: Negative for environmental allergies, food allergies and immunocompromised state.   Neurological: Negative for dizziness, tremors, seizures, syncope, facial asymmetry, speech difficulty, weakness, light-headedness, numbness and headaches.   Hematological: Negative for adenopathy. Bruises/bleeds easily.   Psychiatric/Behavioral: Negative for agitation, behavioral problems, confusion, decreased concentration, dysphoric mood, hallucinations, self-injury, sleep disturbance and suicidal ideas. The patient is not nervous/anxious and is not hyperactive.    All other systems reviewed and are negative.      The patient's past medical history, past surgical history, family history, and social history have been reviewed at length in the electronic medical record.      Physical Exam:   Temp 97.5 °F (36.4 °C)   Ht 162.6 cm (64\")   Wt 54.3 kg (119 lb 9.6 oz)   LMP  (LMP Unknown)   BMI 20.53 kg/m²   CONSTITUTIONAL: Patient is well-nourished, pleasant and appears stated age.  MUSCULOSKELETAL:  Straight leg raising is negative.  Jerzy's Sign is negative.  ROM in the low back is normal.  Tenderness in the back to palpation is not observed.  NEUROLOGICAL:  Orientation, memory, attention span, language function, and cognition have been examined and are intact.  Strength is intact in the lower extremities to direct testing.  Muscle tone is normal throughout.  Station and gait are normal are somewhat stooped.  Sensation is intact to light touch testing throughout.  Deep tendon reflexes are 1+ and symmetrical.  Coordination is intact.      Medical Decision Making    Data Review:   (All imaging studies were personally reviewed unless stated otherwise)  MRI of the lumbar spine dated 8/1/2022 demonstrates extensive and pronounced diffuse degenerative disc disease and facet arthropathy.  There is recess and foraminal narrowing on the left at " L2-3.  There is bilateral recess and foraminal narrowing at L3-4.  Severe right recess and foraminal narrowing is noted at L4-5.  There is biforaminal narrowing at L5-S1.    Diagnosis:   1.  Chronic mechanical low back pain.  2.  Lumbar stenosis with neurogenic claudication.    Treatment Options:   I am going to set up a lumbar CT myelogram with upright images.  If she has significant stenosis then she may be a candidate for lumbar decompression.  If not then I would probably refer her to one of my pain colleagues for a trial of a spinal cord stimulator.  She will need to hold her Eliquis 3 days preceding the myelogram.       Diagnosis Plan   1. Spinal stenosis of lumbar region with neurogenic claudication     2. DDD (degenerative disc disease), lumbar         Scribed for Derrick Schmidt MD by Mary Castellano MILLY 8/23/2022 14:32 EDT      I, Dr. Schmidt, personally performed the services described in the documentation, as scribed in my presence, and it is both accurate and complete.

## 2022-08-24 ENCOUNTER — TELEPHONE (OUTPATIENT)
Dept: CARDIOLOGY | Facility: CLINIC | Age: 72
End: 2022-08-24

## 2022-08-24 ENCOUNTER — TELEPHONE (OUTPATIENT)
Dept: FAMILY MEDICINE CLINIC | Facility: CLINIC | Age: 72
End: 2022-08-24

## 2022-08-24 DIAGNOSIS — U07.1 COVID-19 VIRUS INFECTION: Primary | ICD-10-CM

## 2022-08-24 NOTE — TELEPHONE ENCOUNTER
PATIENT TESTED POSITIVE FOR COVID.  SYMPTOMS INCLUDE LOW FEVER, STUFFY, HEADACHE, HOARSE.  WHAT TO DO?     PLEASE CALL [phone]

## 2022-08-24 NOTE — TELEPHONE ENCOUNTER
Low risk for major perioperative cardiovascular complications for upcoming lumbar myelogram.  Agree with holding Eliquis 3 days prior to procedure.

## 2022-08-24 NOTE — TELEPHONE ENCOUNTER
Omero at 's office is requesting a note be placed in epic. He is going to have a lumbar myelogram and needs to hold his Eliquis 3 days prior to procedure. Please advise.

## 2022-08-25 NOTE — TELEPHONE ENCOUNTER
She is high risk, I would recommend Paxlovid.  She will need to reduce her Eliquis dosage by half, just split her tablets in half and take half tablet every 12 hours for the next 7 days before resuming her normal dose.  This medication is used under emergency use authorization and does carry a risk of rebound symptoms, but given her risk status I think she would benefit certainly.  Outside of this antiviral, recommend over-the-counter Mucinex and Tylenol for symptoms.

## 2022-09-20 ENCOUNTER — OFFICE VISIT (OUTPATIENT)
Dept: FAMILY MEDICINE CLINIC | Facility: CLINIC | Age: 72
End: 2022-09-20

## 2022-09-20 ENCOUNTER — LAB (OUTPATIENT)
Dept: LAB | Facility: HOSPITAL | Age: 72
End: 2022-09-20

## 2022-09-20 VITALS
OXYGEN SATURATION: 95 % | HEART RATE: 80 BPM | WEIGHT: 123 LBS | SYSTOLIC BLOOD PRESSURE: 130 MMHG | HEIGHT: 64 IN | DIASTOLIC BLOOD PRESSURE: 80 MMHG | TEMPERATURE: 97.1 F | BODY MASS INDEX: 21 KG/M2

## 2022-09-20 DIAGNOSIS — Z00.00 PREVENTATIVE HEALTH CARE: ICD-10-CM

## 2022-09-20 DIAGNOSIS — R53.83 FATIGUE, UNSPECIFIED TYPE: ICD-10-CM

## 2022-09-20 DIAGNOSIS — E78.2 MIXED HYPERLIPIDEMIA: ICD-10-CM

## 2022-09-20 DIAGNOSIS — Z12.11 ENCOUNTER FOR SCREENING FOR MALIGNANT NEOPLASM OF COLON: ICD-10-CM

## 2022-09-20 DIAGNOSIS — Z23 IMMUNIZATION DUE: ICD-10-CM

## 2022-09-20 DIAGNOSIS — Z00.00 MEDICARE ANNUAL WELLNESS VISIT, SUBSEQUENT: Primary | ICD-10-CM

## 2022-09-20 DIAGNOSIS — F41.9 ANXIETY: ICD-10-CM

## 2022-09-20 DIAGNOSIS — Z91.81 AT MODERATE RISK FOR FALL: ICD-10-CM

## 2022-09-20 PROCEDURE — 96160 PT-FOCUSED HLTH RISK ASSMT: CPT | Performed by: FAMILY MEDICINE

## 2022-09-20 PROCEDURE — 99397 PER PM REEVAL EST PAT 65+ YR: CPT | Performed by: FAMILY MEDICINE

## 2022-09-20 PROCEDURE — 1170F FXNL STATUS ASSESSED: CPT | Performed by: FAMILY MEDICINE

## 2022-09-20 PROCEDURE — 1160F RVW MEDS BY RX/DR IN RCRD: CPT | Performed by: FAMILY MEDICINE

## 2022-09-20 PROCEDURE — G0439 PPPS, SUBSEQ VISIT: HCPCS | Performed by: FAMILY MEDICINE

## 2022-09-20 PROCEDURE — 1125F AMNT PAIN NOTED PAIN PRSNT: CPT | Performed by: FAMILY MEDICINE

## 2022-09-20 RX ORDER — ESCITALOPRAM OXALATE 20 MG/1
20 TABLET ORAL DAILY
Qty: 90 TABLET | Refills: 3 | Status: SHIPPED | OUTPATIENT
Start: 2022-09-20

## 2022-09-20 NOTE — PATIENT INSTRUCTIONS
Advance Care Planning and Advance Directives     You make decisions on a daily basis - decisions about where you want to live, your career, your home, your life. Perhaps one of the most important decisions you face is your choice for future medical care. Take time to talk with your family and your healthcare team and start planning today.  Advance Care Planning is a process that can help you:  · Understand possible future healthcare decisions in light of your own experiences  · Reflect on those decision in light of your goals and values  · Discuss your decisions with those closest to you and the healthcare professionals that care for you  · Make a plan by creating a document that reflects your wishes    Surrogate Decision Maker  In the event of a medical emergency, which has left you unable to communicate or to make your own decisions, you would need someone to make decisions for you.  It is important to discuss your preferences for medical treatment with this person while you are in good health.     Qualities of a surrogate decision maker:  • Willing to take on this role and responsibility  • Knows what you want for future medical care  • Willing to follow your wishes even if they don't agree with them  • Able to make difficult medical decisions under stressful circumstances    Advance Directives  These are legal documents you can create that will guide your healthcare team and decision maker(s) when needed. These documents can be stored in the electronic medical record.    · Living Will - a legal document to guide your care if you have a terminal condition or a serious illness and are unable to communicate. States vary by statute in document names/types, but most forms may include one or more of the following:        -  Directions regarding life-prolonging treatments        -  Directions regarding artificially provided nutrition/hydration        -  Choosing a healthcare decision maker        -  Direction  regarding organ/tissue donation    · Durable Power of  for Healthcare - this document names an -in-fact to make medical decisions for you, but it may also allow this person to make personal and financial decisions for you. Please seek the advice of an  if you need this type of document.    **Advance Directives are not required and no one may discriminate against you if you do not sign one.    Medical Orders  Many states allow specific forms/orders signed by your physician to record your wishes for medical treatment in your current state of health. This form, signed in personal communication with your physician, addresses resuscitation and other medical interventions that you may or may not want.      For more information or to schedule a time with a [facility] Advance Care Planning Facilitator contact: [facility] or call [phone] and someone will contact you directly.    Fall Prevention in the Home, Adult  Falls can cause injuries and affect people of all ages. There are many simple things that you can do to make your home safe and to help prevent falls. Ask for help when making these changes, if needed.  What actions can I take to prevent falls?  General instructions  • Use good lighting in all rooms. Replace any light bulbs that burn out, turn on lights if it is dark, and use night-lights.  • Place frequently used items in easy-to-reach places. Lower the shelves around your home if necessary.  • Set up furniture so that there are clear paths around it. Avoid moving your furniture around.  • Remove throw rugs and other tripping hazards from the floor.  • Avoid walking on wet floors.  • Fix any uneven floor surfaces.  • Add color or contrast paint or tape to grab bars and handrails in your home. Place contrasting color strips on the first and last steps of staircases.  • When you use a stepladder, make sure that it is completely opened and that the sides and supports are  firmly locked. Have someone hold the ladder while you are using it. Do not climb a closed stepladder.  • Know where your pets are when moving through your home.  What can I do in the bathroom?     • Keep the floor dry. Immediately clean up any water that is on the floor.  • Remove soap buildup in the tub or shower regularly.  • Use nonskid mats or decals on the floor of the tub or shower.  • Attach bath mats securely with double-sided, nonslip rug tape.  • If you need to sit down while you are in the shower, use a plastic, nonslip stool.  • Install grab bars by the toilet and in the tub and shower. Do not use towel bars as grab bars.  What can I do in the bedroom?  • Make sure that a bedside light is easy to reach.  • Do not use oversized bedding that reaches the floor.  • Have a firm chair that has side arms to use for getting dressed.  What can I do in the kitchen?  • Clean up any spills right away.  • If you need to reach for something above you, use a sturdy step stool that has a grab bar.  • Keep electrical cables out of the way.  • Do not use floor polish or wax that makes floors slippery. If you must use wax, make sure that it is non-skid floor wax.  What can I do with my stairs?  • Do not leave any items on the stairs.  • Make sure that you have a light switch at the top and the bottom of the stairs. Have them installed if you do not have them.  • Make sure that there are handrails on both sides of the stairs. Fix handrails that are broken or loose. Make sure that handrails are as long as the staircases.  • Install non-slip stair treads on all stairs in your home.  • Avoid having throw rugs at the top or bottom of stairs, or secure the rugs with carpet tape to prevent them from moving.  • Choose a carpet design that does not hide the edge of steps on the stairs.  • Check any carpeting to make sure that it is firmly attached to the stairs. Fix any carpet that is loose or worn.  What can I do on the outside of  my home?  • Use bright outdoor lighting.  • Regularly repair the edges of walkways and driveways and fix any cracks.  • Remove high doorway thresholds.  • Trim any shrubbery on the main path into your home.  • Regularly check that handrails are securely fastened and in good repair. Both sides of all steps should have handrails.  • Install guardrails along the edges of any raised decks or porches.  • Clear walkways of debris and clutter, including tools and rocks.  • Have leaves, snow, and ice cleared regularly.  • Use sand or salt on walkways during winter months.  • In the garage, clean up any spills right away, including grease or oil spills.  What other actions can I take?  • Wear closed-toe shoes that fit well and support your feet. Wear shoes that have rubber soles or low heels.  • Use mobility aids as needed, such as canes, walkers, scooters, and crutches.  • Review your medicines with your health care provider. Some medicines can cause dizziness or changes in blood pressure, which increase your risk of falling.  Talk with your health care provider about other ways that you can decrease your risk of falls. This may include working with a physical therapist or  to improve your strength, balance, and endurance.  Where to find more information  • Centers for Disease Control and Prevention, [place]: www.cdc.gov  • National [place] on Aging: www.samantha.nih.gov  Contact a health care provider if:  • You are afraid of falling at home.  • You feel weak, drowsy, or dizzy at home.  • You fall at home.  Summary  • There are many simple things that you can do to make your home safe and to help prevent falls.  • Ways to make your home safe include removing tripping hazards and installing grab bars in the bathroom.  • Ask for help when making these changes in your home.  This information is not intended to replace advice given to you by your health care provider. Make sure you discuss any questions you have with your  health care provider.  Document Revised: 07/21/2021 Document Reviewed: 07/21/2021  Elsevier Patient Education © 2022 [employer]      Sit-to-Stand Exercise  The sit-to-stand exercise (also known as the chair stand or chair rise exercise) strengthens your lower body and helps you maintain or improve your mobility and independence. The end goal is to do the sit-to-stand exercise without using your hands. This will be easier as you become stronger. You should always talk with your health care provider before starting any exercise program, especially if you have had recent surgery.  Do the exercise exactly as told by your health care provider and adjust it as directed. It is normal to feel mild stretching, pulling, tightness, or discomfort as you do this exercise, but you should stop right away if you feel sudden pain or your pain gets worse. Do not begin doing this exercise until told by your health care provider.  What the sit-to-stand exercise does  The sit-to-stand exercise helps to strengthen the muscles in your thighs and the muscles in the center of your body that give you stability (core muscles). This exercise is especially helpful if:  • You have had knee or hip surgery.  • You have trouble getting up from a chair, out of a car, or off the toilet due to muscle weakness.  How to do the sit-to-stand exercise  1. Sit toward the front edge of a sturdy chair without armrests. Your knees should be bent and your feet should be flat on the floor and shoulder-width apart and underneath your hips.  2. Place your hands lightly on each side of the seat. Keep your back and neck as straight as possible, with your chest slightly forward.  3. Breathe in slowly. Lean forward and slightly shift your weight to the front of your feet.  4. Breathe out as you slowly stand up. Try not to support any weight with your hands.  5. Stand and pause for a full breath in and out.  6. Breathe in as you sit down slowly. Tighten your core  and abdominal muscles to control your lowering as much as possible. You should lower yourself back to the chair slowly, not just drop back into the seat.  7. Breathe out slowly.  8. Do this exercise 10-15 times. If needed, do it fewer times until you build up strength.  9. Rest for 1 minute, then do another set of 10-15 repetitions.  To change the difficulty of the sit-to-stand exercise  • If the exercise is too difficult, use a chair with sturdy armrests, and push off the armrests to help you come to the standing position. You can also use the armrests to help slowly lower yourself back to sitting. As this gets easier, try to use your arms less. You can also place a firm cushion or pillow on the chair to make the surface higher.  • If this exercise is too easy, do not use your arms to help raise or lower yourself. You can also wear a weighted vest, use hand weights, increase your repetitions, or try a lower chair.  General tips  • You may feel tired when starting an exercise routine. This is normal.  • You may have muscle soreness that lasts a few days. This is normal. As you get stronger, you may not feel muscle soreness.  • Use smooth, steady movements.  • Do not  hold your breath during strength exercises. This can cause unsafe changes in your blood pressure.  • Breathe in slowly through your nose, and breathe out slowly through your mouth.  Summary  • Strengthening your lower body is an important step to help you move safely and independently.  • The sit-to-stand exercise helps strengthen the muscles in your thighs and core.  • You should always talk with your health care provider before starting any exercise program, especially if you have had recent surgery.  This information is not intended to replace advice given to you by your health care provider. Make sure you discuss any questions you have with your health care provider.  Document Revised: 04/10/2022 Document Reviewed: 04/10/2022  Elsevier Patient  Education ©  [employer]    You are due for Shingrix vaccination series ( the newest shingles vaccine).  It is a two shot series spaced 2-6 months apart. Please get this vaccine series started at your earliest convenience at your local pharmacy to help avoid shingles outbreak. It is more effective than the old Zostavax vaccine and is recommended even if you have had the Zostavax vaccine in the past.  Once the Shingrix series is completed, it does not need to be repeated.   For more information, please look at the website below:  [facility] Shingrix Vaccine Information      Medicare Wellness  Personal Prevention Plan of Service     Date of Office Visit:    Encounter Provider:  Anand Pineda DO  Place of Service:  [facility]  Patient Name: Mar Joseph  :  1950    As part of the Medicare Wellness portion of your visit today, we are providing you with this personalized preventive plan of services (PPPS). This plan is based upon recommendations of the [place] Preventive Services Task Force (USPSTF) and the Advisory Committee on Immunization Practices (ACIP).    This lists the preventive care services that should be considered, and provides dates of when you are due. Items listed as completed are up-to-date and do not require any further intervention.    Health Maintenance   Topic Date Due   • DXA SCAN  Never done   • COLORECTAL CANCER SCREENING  Never done   • ZOSTER VACCINE (1 of 2) Never done   • HEPATITIS C SCREENING  Never done   • Pneumococcal Vaccine 65+ (2 - PCV) 2021   • LIPID PANEL  2022   • INFLUENZA VACCINE  10/01/2022   • MAMMOGRAM  11/10/2022   • ANNUAL WELLNESS VISIT  2023   • TDAP/TD VACCINES (2 - Td or Tdap) 2025   • COVID-19 Vaccine  Completed       Orders Placed This Encounter   Procedures   • Pneumococcal Conjugate Vaccine 20-Valent All   • Ambulatory Referral For Screening Colonoscopy (Gastroenterology)     Referral  Priority:   Routine     Referral Type:   Diagnostic Medical     Referral Reason:   Specialty Services Required     Number of Visits Requested:   1       Return for Medicare Wellness, Annual.

## 2022-09-20 NOTE — PROGRESS NOTES
".[id number]    The ABCs of the Annual Wellness Visit  Subsequent Medicare Wellness Visit    Chief Complaint   Patient presents with   • Medicare Wellness-subsequent   • Anxiety   • Annual Exam      Subjective    History of Present Illness:  Mar Joseph is a 71 y.o. female who presents for a Subsequent Medicare Wellness Visit.    The patient reports that she had COVID-19 approximately 2 weeks ago and she continues to have lingering effects. She states that she continues to have a cough and occasionally has a \"metal taste\" in her mouth secondary to the Paxlovid. She states that the \"funny taste\" is very brief and it has been ongoing for approximately 2 weeks. The patient asks when she can get her 3rd COVID booster.     She states that her health is doing well compared to last year; however, she has been very fatigued since having COVID-19. She denies having any trouble swallowing or throat pain.    She states that she would like to increase her Lexapro dose. She states that she feels as if it is no longer working.    She states that it has been more than 5 years since her last colonoscopy.    She states that her hearing is not doing well secondary to having congestion and her ears popping at times secondary to the mucus. She states that she is taking Mucinex DM and it helps to a certain degree. She states that she continues to wake up at night with a cough. She states that she has a Neti pot at home.    She denies having any swelling in her hands or feet. She denies having any urinary incontinence. 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    The patient will have lab work done.     The following portions of the patient's history were reviewed and   updated as appropriate: allergies, current medications, past family history, past medical history, past social history, past surgical history and problem list.    Compared to one " year ago, the patient feels her physical   health is the same.    Compared to one year ago, the patient feels her mental   health is the same.    Recent Hospitalizations:  She was not admitted to the hospital during the last year.       Current Medical Providers:  Patient Care Team:  Anand Pineda DO as PCP - General (Family Medicine)  Isiah Garcia DO as Consulting Physician (Cardiology)  Kirk Ocasio III, MD as Cardiologist (Cardiology)    Outpatient Medications Prior to Visit   Medication Sig Dispense Refill   • acetaminophen (TYLENOL) 500 MG tablet Take 500 mg by mouth Every 6 (Six) Hours As Needed for Mild Pain .     • apixaban (Eliquis) 5 MG tablet tablet Take 1 tablet by mouth 2 (Two) Times a Day. 180 tablet 3   • bimatoprost (LUMIGAN) 0.01 % ophthalmic drops Administer 1 drop to both eyes Every Night.     • sotalol (BETAPACE) 80 MG tablet TAKE 1/2 TABLET EVERY DAY 45 tablet 3   • traMADol (ULTRAM) 50 MG tablet Take 50 mg by mouth 4 (Four) Times a Day.     • travoprost, BAK free, (TRAVATAN) 0.004 % solution ophthalmic solution travoprost 0.004 % eye drops   Instill by ophthalmic route.     • VITAMIN D PO Take 50 mcg by mouth Daily.     • escitalopram (LEXAPRO) 10 MG tablet TAKE 1 TABLET EVERY DAY 90 tablet 3   • Omega-3 Fatty Acids (FISH OIL) 1000 MG capsule capsule Take  by mouth Daily With Breakfast.       No facility-administered medications prior to visit.       Opioid medication/s are on active medication list.  and I have evaluated her active treatment plan and pain score trends (see table).  Vitals:    09/20/22 1025   PainSc:   2     I have reviewed the chart for potential of high risk medication and harmful drug interactions in the elderly.            Aspirin is not on active medication list.  Aspirin use is not indicated based on review of current medical condition/s. Risk of harm outweighs potential benefits.  .    Patient Active Problem List   Diagnosis   • Paroxysmal atrial fibrillation  "(HCC)   • Tobacco use   • Chronic colitis   • Palpitation   • Mixed hyperlipidemia   • Anxiety   • Atrial fibrillation, persistent (HCC)   • Chronic anticoagulation   • Long term current use of antiarrhythmic drug     Advance Care Planning  Advance Directive is not on file.  ACP discussion was held with the patient during this visit. Patient has an advance directive (not in EMR), copy requested.          Objective    Vitals:    09/20/22 1025   BP: 130/80   Pulse: 80   Temp: 97.1 °F (36.2 °C)   SpO2: 95%   Weight: 55.8 kg (123 lb)   Height: 162.6 cm (64\")   PainSc:   2     Estimated body mass index is 21.11 kg/m² as calculated from the following:    Height as of this encounter: 162.6 cm (64\").    Weight as of this encounter: 55.8 kg (123 lb).    BMI is within normal parameters. No other follow-up for BMI required.      Does the patient have evidence of cognitive impairment? No    Physical Exam  Musculoskeletal:      Right hand: Deformity present.      Left hand: Deformity present.     Const: NAD, A&Ox4, Pleasant, Cooperative  Eyes: EOMI, no conjunctivitis  ENT: No nasal discharge present, neck supple  Cardiac: Regular rate and rhythm, no cyanosis  Resp: Respiratory rate within normal limits, no increased work of breathing, no audible wheezing or retractions noted  GI: No distention or ascites  MSK: Motor and sensation grossly intact in bilateral upper extremities  Neurologic: CN II-XII grossly intact  Psych: Appropriate mood and behavior.  Skin: Pink, warm, dry  Lab Results   Component Value Date    CHLPL 243 (H) 09/20/2022    TRIG 73 09/20/2022    HDL 96 (H) 09/20/2022     (H) 09/20/2022    VLDL 12 09/20/2022            HEALTH RISK ASSESSMENT    Smoking Status:  Social History     Tobacco Use   Smoking Status Current Every Day Smoker   • Packs/day: 0.25   • Years: 45.00   • Pack years: 11.25   • Types: Cigarettes   Smokeless Tobacco Never Used     Alcohol Consumption:  Social History     Substance and Sexual " Activity   Alcohol Use Yes   • Alcohol/week: 2.0 standard drinks   • Types: 1 Glasses of wine, 1 Cans of beer per week     Fall Risk Screen:    SALVADOR Fall Risk Assessment was completed, and patient is at MODERATE risk for falls. Assessment completed on:9/20/2022    Depression Screening:  PHQ-2/PHQ-9 Depression Screening 9/20/2022   Retired PHQ-9 Total Score -   Retired Total Score -   Little Interest or Pleasure in Doing Things 0-->not at all   Feeling Down, Depressed or Hopeless 0-->not at all   PHQ-9: Brief Depression Severity Measure Score 0       Health Habits and Functional and Cognitive Screening:  Functional & Cognitive Status 9/20/2022   Do you have difficulty preparing food and eating? No   Do you have difficulty bathing yourself, getting dressed or grooming yourself? No   Do you have difficulty using the toilet? No   Do you have difficulty moving around from place to place? No   Do you have trouble with steps or getting out of a bed or a chair? No   Current Diet Well Balanced Diet   Dental Exam Up to date   Eye Exam Up to date   Exercise (times per week) 4 times per week   Current Exercises Include Stationary Bicycling/Spin Class   Current Exercise Activities Include -   Do you need help using the phone?  No   Are you deaf or do you have serious difficulty hearing?  No   Do you need help with transportation? No   Do you need help shopping? No   Do you need help preparing meals?  No   Do you need help with housework?  No   Do you need help with laundry? No   Do you need help taking your medications? No   Do you need help managing money? No   Do you ever drive or ride in a car without wearing a seat belt? No   Have you felt unusual stress, anger or loneliness in the last month? No   Who do you live with? Spouse   If you need help, do you have trouble finding someone available to you? No   Have you been bothered in the last four weeks by sexual problems? No   Do you have difficulty concentrating, remembering  or making decisions? No       Age-appropriate Screening Schedule:  Refer to the list below for future screening recommendations based on patient's age, sex and/or medical conditions. Orders for these recommended tests are listed in the plan section. The patient has been provided with a written plan.    Health Maintenance   Topic Date Due   • DXA SCAN  Never done   • ZOSTER VACCINE (1 of 2) Never done   • INFLUENZA VACCINE  10/01/2022   • MAMMOGRAM  11/10/2022   • LIPID PANEL  09/20/2023   • TDAP/TD VACCINES (2 - Td or Tdap) 02/02/2025              Assessment & Plan      Medicare annual wellness visit, subsequent  She will return after 10/01/2022 for the flu and COVID-19 vaccines.    Fatigue  We will check her B12 stores today. If they are low, we will send in a prescription for B12 or a B12 injection.    Anxiety  We will increase her Lexapro to 20 mg daily. If she notices that it is still not controlling her symptoms after 4 weeks, she will back down to the previous dose. At that point, our plan would be to come off of it entirely over the next 6 months and then start her on something different.    Colon cancer screening  I have placed a referral for a colonoscopy.    The wellness exam has been reviewed in detail. The patient has been fully counseled on preventative guidelines for vaccines, cancer screenings, and other health maintenance needs. Functional testing has been performed to assess capacity for independent living and need for other medical interventions. Screening for depression was completed via a validated screening model as indicated above, 15 minutes was spent in total on depression screening. The patient was counseled on maintaining a lifestyle to promote good health and to minimize chronic diseases, including 15 minutes spent screening for alcohol misuse and counseling on safe and appropriate alcohol intake and overall risk reduction as indicated above. The patient has been assisted with scheduling  healthcare procedures for the coming year and given a written document outlining these recommendations.    CMS Preventative Services Quick Reference  Risk Factors Identified During Encounter  None Identified  The above risks/problems have been discussed with the patient.  Follow up actions/plans if indicated are seen below in the Assessment/Plan Section.  Pertinent information has been shared with the patient in the After Visit Summary.    Diagnoses and all orders for this visit:    1. Medicare annual wellness visit, subsequent (Primary)    2. Preventative health care    3. Encounter for screening for malignant neoplasm of colon  -     Ambulatory Referral For Screening Colonoscopy (Gastroenterology)    4. At moderate risk for fall    5. Immunization due  -     Pneumococcal Conjugate Vaccine 20-Valent All    6. Anxiety  -     escitalopram (LEXAPRO) 20 MG tablet; Take 1 tablet by mouth Daily.  Dispense: 90 tablet; Refill: 3    7. Fatigue, unspecified type  -     CBC (No Diff); Future  -     Comprehensive Metabolic Panel; Future  -     Urinalysis With Microscopic If Indicated (No Culture) - Urine, Clean Catch; Future  -     TSH; Future  -     Vitamin B12; Future  -     Methylmalonic Acid, Serum; Future  -     Methylmalonic Acid, Serum  -     Vitamin B12  -     TSH  -     Urinalysis With Microscopic If Indicated (No Culture) - Urine, Clean Catch  -     Comprehensive Metabolic Panel  -     CBC (No Diff)    8. Mixed hyperlipidemia  -     Lipid Panel; Future  -     Lipid Panel    Other orders  -     Microscopic Examination -      Problem List Items Addressed This Visit        Cardiac and Vasculature    Mixed hyperlipidemia    Relevant Orders    Lipid Panel (Completed)       Mental Health    Anxiety    Relevant Medications    escitalopram (LEXAPRO) 20 MG tablet      Other Visit Diagnoses     Medicare annual wellness visit, subsequent    -  Primary    Preventative health care        Encounter for screening for malignant  neoplasm of colon        Relevant Orders    Ambulatory Referral For Screening Colonoscopy (Gastroenterology)    At moderate risk for fall        Immunization due        Relevant Orders    Pneumococcal Conjugate Vaccine 20-Valent All    Fatigue, unspecified type        Relevant Orders    CBC (No Diff) (Completed)    Comprehensive Metabolic Panel (Completed)    Urinalysis With Microscopic If Indicated (No Culture) - Urine, Clean Catch (Completed)    TSH (Completed)    Vitamin B12 (Completed)    Methylmalonic Acid, Serum (Completed)        The preventive exam has been reviewed in detail.  The patient has been fully counseled on preventative guidelines for vaccines, cancer screenings, and other health maintenance needs.   The patient has been counseled on guidelines for maintaining a lifestyle to promote good health and to minimize chronic diseases.  The patient has been assisted with scheduling these healthcare procedures for the coming year and given a written document outlining these recommendations.    Follow Up:   Return in 1 year (on 9/20/2023) for Medicare Wellness, Annual.     An After Visit Summary and PPPS were made available to the patient.               Transcribed from ambient dictation for Anand Pineda DO by SILVANA PRINCE.  09/20/22   12:27 EDT    Patient verbalized consent to the visit recording.  I have personally performed the services described in this document as transcribed by the above individual, and it is both accurate and complete.  Anand Pineda DO  9/24/2022  12:11 EDT

## 2022-09-21 LAB
APPEARANCE UR: CLEAR
BACTERIA #/AREA URNS HPF: NORMAL /HPF
BILIRUB UR QL STRIP: NEGATIVE
CASTS URNS MICRO: NORMAL
COLOR UR: YELLOW
EPI CELLS #/AREA URNS HPF: NORMAL /HPF
GLUCOSE UR QL STRIP: NEGATIVE
HGB UR QL STRIP: NEGATIVE
KETONES UR QL STRIP: NEGATIVE
LEUKOCYTE ESTERASE UR QL STRIP: ABNORMAL
NITRITE UR QL STRIP: NEGATIVE
PH UR STRIP: 8 [PH] (ref 5–8)
PROT UR QL STRIP: NEGATIVE
RBC #/AREA URNS HPF: NORMAL /HPF
SP GR UR STRIP: 1.02 (ref 1–1.03)
UROBILINOGEN UR STRIP-MCNC: ABNORMAL MG/DL
WBC #/AREA URNS HPF: NORMAL /HPF

## 2022-09-22 LAB
ALBUMIN SERPL-MCNC: 4.5 G/DL (ref 3.5–5.2)
ALBUMIN/GLOB SERPL: 1.9 G/DL
ALP SERPL-CCNC: 66 U/L (ref 39–117)
ALT SERPL-CCNC: 12 U/L (ref 1–33)
AST SERPL-CCNC: 16 U/L (ref 1–32)
BILIRUB SERPL-MCNC: 0.3 MG/DL (ref 0–1.2)
BUN SERPL-MCNC: 14 MG/DL (ref 8–23)
BUN/CREAT SERPL: 32.6 (ref 7–25)
CALCIUM SERPL-MCNC: 9.7 MG/DL (ref 8.6–10.5)
CHLORIDE SERPL-SCNC: 97 MMOL/L (ref 98–107)
CHOLEST SERPL-MCNC: 243 MG/DL (ref 0–200)
CO2 SERPL-SCNC: 24 MMOL/L (ref 22–29)
CREAT SERPL-MCNC: 0.43 MG/DL (ref 0.57–1)
EGFRCR SERPLBLD CKD-EPI 2021: 104.8 ML/MIN/1.73
ERYTHROCYTE [DISTWIDTH] IN BLOOD BY AUTOMATED COUNT: 10.8 % (ref 12.3–15.4)
GLOBULIN SER CALC-MCNC: 2.4 GM/DL
GLUCOSE SERPL-MCNC: 106 MG/DL (ref 65–99)
HCT VFR BLD AUTO: 36.4 % (ref 34–46.6)
HDLC SERPL-MCNC: 96 MG/DL (ref 40–60)
HGB BLD-MCNC: 12.5 G/DL (ref 12–15.9)
LDLC SERPL CALC-MCNC: 135 MG/DL (ref 0–100)
MCH RBC QN AUTO: 32.8 PG (ref 26.6–33)
MCHC RBC AUTO-ENTMCNC: 34.3 G/DL (ref 31.5–35.7)
MCV RBC AUTO: 95.5 FL (ref 79–97)
METHYLMALONATE SERPL-SCNC: 183 NMOL/L (ref 0–378)
PLATELET # BLD AUTO: 246 10*3/MM3 (ref 140–450)
POTASSIUM SERPL-SCNC: 4.7 MMOL/L (ref 3.5–5.2)
PROT SERPL-MCNC: 6.9 G/DL (ref 6–8.5)
RBC # BLD AUTO: 3.81 10*6/MM3 (ref 3.77–5.28)
SODIUM SERPL-SCNC: 133 MMOL/L (ref 136–145)
TRIGL SERPL-MCNC: 73 MG/DL (ref 0–150)
TSH SERPL DL<=0.005 MIU/L-ACNC: 0.84 UIU/ML (ref 0.27–4.2)
VIT B12 SERPL-MCNC: 476 PG/ML (ref 211–946)
VLDLC SERPL CALC-MCNC: 12 MG/DL (ref 5–40)
WBC # BLD AUTO: 7.92 10*3/MM3 (ref 3.4–10.8)

## 2022-10-19 ENCOUNTER — TELEPHONE (OUTPATIENT)
Dept: FAMILY MEDICINE CLINIC | Facility: CLINIC | Age: 72
End: 2022-10-19

## 2023-01-04 ENCOUNTER — OFFICE VISIT (OUTPATIENT)
Dept: CARDIOLOGY | Facility: CLINIC | Age: 73
End: 2023-01-04
Payer: MEDICARE

## 2023-01-04 VITALS
WEIGHT: 124 LBS | OXYGEN SATURATION: 97 % | HEART RATE: 67 BPM | DIASTOLIC BLOOD PRESSURE: 60 MMHG | BODY MASS INDEX: 21.17 KG/M2 | SYSTOLIC BLOOD PRESSURE: 126 MMHG | HEIGHT: 64 IN

## 2023-01-04 DIAGNOSIS — I48.19 ATRIAL FIBRILLATION, PERSISTENT: Primary | ICD-10-CM

## 2023-01-04 DIAGNOSIS — E78.2 MIXED HYPERLIPIDEMIA: ICD-10-CM

## 2023-01-04 PROBLEM — M48.061 SPINAL STENOSIS OF LUMBAR REGION: Status: ACTIVE | Noted: 2022-10-13

## 2023-01-04 PROCEDURE — 93000 ELECTROCARDIOGRAM COMPLETE: CPT | Performed by: INTERNAL MEDICINE

## 2023-01-04 PROCEDURE — 99214 OFFICE O/P EST MOD 30 MIN: CPT | Performed by: INTERNAL MEDICINE

## 2023-01-04 PROCEDURE — 1159F MED LIST DOCD IN RCRD: CPT | Performed by: INTERNAL MEDICINE

## 2023-01-04 PROCEDURE — 1160F RVW MEDS BY RX/DR IN RCRD: CPT | Performed by: INTERNAL MEDICINE

## 2023-01-04 NOTE — PROGRESS NOTES
[facility] at [facility]  Office visit  Mar Joseph  1950    [phone] (work)    VISIT DATE:  1/4/2023    PCP: Anand Pineda DO  [address]  [facility] [address]    CC:  Chief Complaint   Patient presents with   • Atrial fibrillation, persistent (HCC)       Previous cardiac studies and procedures:  October 2019  Transesophageal echocardiogram with successful cardioversion  · Left ventricular systolic function is normal. Estimated EF = 60%.  · Mild to moderate biatrial enlargement.  · No evidence of intracardiac thrombus or mass, clear left atrial appendage.  · No significant valvular disease.    June 2020 A. fib ablation.    ASSESSMENT:   Diagnosis Plan   1. Atrial fibrillation, persistent (HCC)        2. Mixed hyperlipidemia            PLAN:  Continue Eliquis 5 mg p.o. twice daily  Continue sotalol 40 mg p.o. daily  Excellent lipid profile.    -Reviewed recent TSH, CMP and lipid profile.    Subjective  History of paroxysmal atrial fibrillation, initial episode in 2013.  Maintaining active lifestyle, exercising at the [employer] on a regular basis.  No recurrent symptoms concerning for atrial fibrillation since her ablation.     PHYSICAL EXAMINATION:  Vitals:    01/04/23 1030   BP: 126/60   BP Location: Right arm   Patient Position: Sitting   Pulse: 67   SpO2: 97%   Weight: 56.2 kg (124 lb)   Height: 162.6 cm (64\")     General Appearance:    Alert, cooperative, no distress, appears stated age   Head:    Normocephalic, without obvious abnormality, atraumatic   Eyes:    conjunctiva/corneas clear   Nose:   Nares normal, septum midline, mucosa normal, no drainage   Throat:   Lips, teeth and gums normal   Neck:   Supple, symmetrical, trachea midline, no carotid    bruit or JVD   Lungs:     Clear to auscultation bilaterally, respirations unlabored   Chest Wall:    No tenderness or deformity    Heart:   Irregularly irregular, S1 and S2 normal, no murmur, rub   or gallop,  normal carotid impulse bilaterally without bruit.   Abdomen:     Soft, non-tender   Extremities:   Extremities normal, atraumatic, no cyanosis or edema   Pulses:   2+ and symmetric all extremities   Skin:   Skin color, texture, turgor normal, no rashes or lesions       Diagnostic Data:    ECG 12 Lead    Date/Time: 1/4/2023 10:46 AM  Performed by: Kirk Ocasio III, MD  Authorized by: Kirk Ocasio III, MD   Comparison: compared with previous ECG from 7/11/2022  Similar to previous ECG  Rhythm: sinus rhythm    Clinical impression: normal ECG          Lab Results   Component Value Date    CHLPL 243 (H) 09/20/2022    TRIG 73 09/20/2022    HDL 96 (H) 09/20/2022     Lab Results   Component Value Date    GLUCOSE 106 (H) 09/20/2022    BUN 14 09/20/2022    CREATININE 0.43 (L) 09/20/2022     (L) 09/20/2022    K 4.7 09/20/2022    CL 97 (L) 09/20/2022    CO2 24.0 09/20/2022     Lab Results   Component Value Date    HGBA1C 5.30 07/28/2021     Lab Results   Component Value Date    WBC 7.92 09/20/2022    HGB 12.5 09/20/2022    HCT 36.4 09/20/2022     09/20/2022       Allergies  Allergies   Allergen Reactions   • Hydrocodone-Acetaminophen Nausea Only   • Ibuprofen GI Bleeding     Patient does not take due to being on blood thinners   • Hydrocodone GI Intolerance   • Moxifloxacin Unknown (See Comments)   • Penicillins Hives   • Sulfa Antibiotics Nausea Only     nausea       Current Medications    Current Outpatient Medications:   •  acetaminophen (TYLENOL) 500 MG tablet, Take 500 mg by mouth Every 6 (Six) Hours As Needed for Mild Pain ., Disp: , Rfl:   •  apixaban (Eliquis) 5 MG tablet tablet, Take 1 tablet by mouth 2 (Two) Times a Day., Disp: 180 tablet, Rfl: 3  •  escitalopram (LEXAPRO) 20 MG tablet, Take 1 tablet by mouth Daily., Disp: 90 tablet, Rfl: 3  •  Omega-3 Fatty Acids (FISH OIL) 1000 MG capsule capsule, Take  by mouth Daily With Breakfast., Disp: , Rfl:   •  sotalol (BETAPACE) 80 MG tablet, TAKE 1/2 TABLET  EVERY DAY, Disp: 45 tablet, Rfl: 3  •  traMADol (ULTRAM) 50 MG tablet, Take 50 mg by mouth 4 (Four) Times a Day., Disp: , Rfl:   •  travoprost, REBEKA free, (TRAVATAN) 0.004 % solution ophthalmic solution, travoprost 0.004 % eye drops  Instill by ophthalmic route., Disp: , Rfl:   •  VITAMIN D PO, Take 50 mcg by mouth Daily., Disp: , Rfl:           ROS  Review of Systems   Constitutional: Positive for malaise/fatigue.   Cardiovascular: Positive for irregular heartbeat and palpitations.   Respiratory: Negative for cough and shortness of breath.        SOCIAL HX  Social History     Socioeconomic History   • Marital status:    Tobacco Use   • Smoking status: Some Days     Packs/day: 0.25     Years: 15.00     Pack years: 3.75     Types: Cigarettes   • Smokeless tobacco: Never   Vaping Use   • Vaping Use: Never used   Substance and Sexual Activity   • Alcohol use: Yes     Alcohol/week: 2.0 standard drinks     Types: 1 Glasses of wine, 1 Cans of beer per week   • Drug use: Not Currently   • Sexual activity: Not Currently     Partners: Male       FAMILY HX  Family History   Problem Relation Age of Onset   • Asthma Mother    • No Known Problems Father    • Heart disease Sister    • Cancer Brother    • No Known Problems Maternal Grandmother    • No Known Problems Maternal Grandfather    • No Known Problems Paternal Grandmother    • No Known Problems Paternal Grandfather    • No Known Problems Brother    • Heart disease Other    • Heart attack Other    • Diabetes Other              Kirk Ocasio III, MD, [facility]

## 2023-04-10 ENCOUNTER — OFFICE VISIT (OUTPATIENT)
Dept: CARDIOLOGY | Facility: CLINIC | Age: 73
End: 2023-04-10
Payer: MEDICARE

## 2023-04-10 VITALS
DIASTOLIC BLOOD PRESSURE: 64 MMHG | HEIGHT: 64 IN | SYSTOLIC BLOOD PRESSURE: 116 MMHG | OXYGEN SATURATION: 97 % | WEIGHT: 119 LBS | HEART RATE: 68 BPM | BODY MASS INDEX: 20.32 KG/M2

## 2023-04-10 DIAGNOSIS — Z79.01 CHRONIC ANTICOAGULATION: ICD-10-CM

## 2023-04-10 DIAGNOSIS — Z79.899 LONG TERM CURRENT USE OF ANTIARRHYTHMIC DRUG: ICD-10-CM

## 2023-04-10 DIAGNOSIS — I48.0 PAROXYSMAL ATRIAL FIBRILLATION: Primary | ICD-10-CM

## 2023-04-10 RX ORDER — SOTALOL HYDROCHLORIDE 80 MG/1
80 TABLET ORAL 2 TIMES DAILY
Qty: 60 TABLET | Refills: 5 | Status: SHIPPED | OUTPATIENT
Start: 2023-04-10 | End: 2023-04-13 | Stop reason: SDUPTHER

## 2023-04-10 NOTE — LETTER
"April 10, 2023     Anand Pineda DO  [address] [facility] [address]    Patient: Mar Joseph   YOB: 1950   Date of Visit: 4/10/2023       Dear Dr. Edwin DO:    Thank you for referring Mar Joseph to me for evaluation. Below are the relevant portions of my assessment and plan of care.    If you have questions, please do not hesitate to call me. I look forward to following Mar along with you.         Sincerely,        RONAK Rose        CC: No Recipients    Artur Figueredo PA  04/10/23 1149  Signed          Encounter Date:04/10/2023     Patient ID: Mar Joseph is a 72 y.o. female.    Anand Pineda DO    Chief Complaint: PAF and Palpitations      PROBLEM LIST:  Patient Active Problem List    Diagnosis Date Noted   • Paroxysmal atrial fibrillation 01/13/2017     Priority: High     Note Last Updated: 4/10/2023     1. Isolated episode of atrial fibrillation converting spontaneously on beta-blocker therapy, September 2013.  2. Cardiac ablation done on 6/26/2020, tolerated well.  3. Eliquis 5 mg      • Mixed hyperlipidemia 10/11/2019     Priority: Medium   • Chronic anticoagulation 07/11/2022     Priority: Low   • Long term current use of antiarrhythmic drug 07/11/2022     Priority: Low   • Tobacco use 01/13/2017     Priority: Low   • Spinal stenosis of lumbar region 10/13/2022   • Anxiety 11/18/2019   • Chronic colitis 01/13/2017               History of Present Illness  Patient presents today for follow-up with a history of paroxysmal atrial fibrillation status post ablation on long-term antiarrhythmic and anticoagulation.  She returns for scheduled EP follow-up.  She reports having multiple \"breakthrough\" episodes of A-fib which may last from 5 minutes to a couple of hours.  These make her feel fatigued and lightheaded.  When they occur she goes to lie down and waits for them to resolve.  She does not check her blood pressure at home.  She states compliance " "with current medical regimen reports no significant adverse side effects    Allergies   Allergen Reactions   • Hydrocodone-Acetaminophen Nausea Only   • Ibuprofen GI Bleeding     Patient does not take due to being on blood thinners   • Hydrocodone GI Intolerance   • Moxifloxacin Unknown (See Comments)   • Penicillins Hives   • Sulfa Antibiotics Nausea Only     nausea       Current Outpatient Medications   Medication Instructions   • acetaminophen (TYLENOL) 500 mg, Oral, Every 6 Hours PRN   • apixaban (ELIQUIS) 5 mg, Oral, 2 Times Daily   • escitalopram (LEXAPRO) 20 mg, Oral, Daily   • Omega-3 Fatty Acids (FISH OIL) 1000 MG capsule capsule Daily With Breakfast   • sotalol (BETAPACE) 80 MG tablet TAKE 1/2 TABLET EVERY DAY   • traMADol (ULTRAM) 50 mg, Oral, 4 Times Daily   • travoprost, REBEKA free, (TRAVATAN) 0.004 % solution ophthalmic solution travoprost 0.004 % eye drops   Instill by ophthalmic route.   • VITAMIN D PO 1,000 Int'l Units, Oral, Daily       .    Objective:     /64 (BP Location: Right arm, Patient Position: Sitting)   Pulse 68   Ht 162.6 cm (64.02\")   Wt 54 kg (119 lb)   LMP  (LMP Unknown)   SpO2 97%   BMI 20.42 kg/m²   Body mass index is 20.42 kg/m².     Vitals reviewed.   Constitutional:       Appearance: Well-developed.   Pulmonary:      Effort: Pulmonary effort is normal. No respiratory distress.      Breath sounds: Normal breath sounds. No wheezing. No rales.      Comments: Bases clear  Chest:      Chest wall: Not tender to palpatation.   Cardiovascular:      Normal rate. Regular rhythm.      Murmurs: There is no murmur.      No gallop. No click. No rub.   Pulses:     Intact distal pulses.   Musculoskeletal: Normal range of motion.       Lab Review:                 TSH    TSH 9/20/22   TSH 0.838                   Procedures              Assessment:      Diagnosis Plan   1. Paroxysmal atrial fibrillation  ECG 12 Lead today shows normal sinus rhythm.  Increase sotalol to 80 mg twice daily.  " Reevaluate in 3 months      2. Chronic anticoagulation   tolerating anticoagulation, continue Eliquis 5 mg twice daily      3. Long term current use of antiarrhythmic drug   stable EKG.  Increase sotalol to 80 mg twice daily.  Recheck EKG in 2 to 3 days        Plan:     Stable cardiac status.  Continue current medications.   in 3 months, sooner as needed.  Thank you for allowing us to participate in the care of your patient.     Electronically signed by RONAK Rose, 04/10/23, 11:48 AM EDT.

## 2023-04-10 NOTE — PROGRESS NOTES
"        Encounter Date:04/10/2023      Patient ID: Mar Joseph is a 72 y.o. female.    Anand Pineda DO    Chief Complaint: PAF and Palpitations      PROBLEM LIST:  Patient Active Problem List    Diagnosis Date Noted   • Paroxysmal atrial fibrillation 01/13/2017     Priority: High     Note Last Updated: 4/10/2023     1. Isolated episode of atrial fibrillation converting spontaneously on beta-blocker therapy, September 2013.  2. Cardiac ablation done on 6/26/2020, tolerated well.  3. Eliquis 5 mg      • Mixed hyperlipidemia 10/11/2019     Priority: Medium   • Chronic anticoagulation 07/11/2022     Priority: Low   • Long term current use of antiarrhythmic drug 07/11/2022     Priority: Low   • Tobacco use 01/13/2017     Priority: Low   • Spinal stenosis of lumbar region 10/13/2022   • Anxiety 11/18/2019   • Chronic colitis 01/13/2017               History of Present Illness  Patient presents today for follow-up with a history of paroxysmal atrial fibrillation status post ablation on long-term antiarrhythmic and anticoagulation.  She returns for scheduled EP follow-up.  She reports having multiple \"breakthrough\" episodes of A-fib which may last from 5 minutes to a couple of hours.  These make her feel fatigued and lightheaded.  When they occur she goes to lie down and waits for them to resolve.  She does not check her blood pressure at home.  She states compliance with current medical regimen reports no significant adverse side effects    Allergies   Allergen Reactions   • Hydrocodone-Acetaminophen Nausea Only   • Ibuprofen GI Bleeding     Patient does not take due to being on blood thinners   • Hydrocodone GI Intolerance   • Moxifloxacin Unknown (See Comments)   • Penicillins Hives   • Sulfa Antibiotics Nausea Only     nausea       Current Outpatient Medications   Medication Instructions   • acetaminophen (TYLENOL) 500 mg, Oral, Every 6 Hours PRN   • apixaban (ELIQUIS) 5 mg, Oral, 2 Times Daily   • " "escitalopram (LEXAPRO) 20 mg, Oral, Daily   • Omega-3 Fatty Acids (FISH OIL) 1000 MG capsule capsule Daily With Breakfast   • sotalol (BETAPACE) 80 MG tablet TAKE 1/2 TABLET EVERY DAY   • traMADol (ULTRAM) 50 mg, Oral, 4 Times Daily   • travoprost, BAK free, (TRAVATAN) 0.004 % solution ophthalmic solution travoprost 0.004 % eye drops   Instill by ophthalmic route.   • VITAMIN D PO 1,000 Int'l Units, Oral, Daily       .    Objective:     /64 (BP Location: Right arm, Patient Position: Sitting)   Pulse 68   Ht 162.6 cm (64.02\")   Wt 54 kg (119 lb)   LMP  (LMP Unknown)   SpO2 97%   BMI 20.42 kg/m²    Body mass index is 20.42 kg/m².     Vitals reviewed.   Constitutional:       Appearance: Well-developed.   Pulmonary:      Effort: Pulmonary effort is normal. No respiratory distress.      Breath sounds: Normal breath sounds. No wheezing. No rales.      Comments: Bases clear  Chest:      Chest wall: Not tender to palpatation.   Cardiovascular:      Normal rate. Regular rhythm.      Murmurs: There is no murmur.      No gallop. No click. No rub.   Pulses:     Intact distal pulses.   Musculoskeletal: Normal range of motion.       Lab Review:                 TSH    TSH 9/20/22   TSH 0.838                   Procedures               Assessment:      Diagnosis Plan   1. Paroxysmal atrial fibrillation  ECG 12 Lead today shows normal sinus rhythm.  Increase sotalol to 80 mg twice daily.  Reevaluate in 3 months      2. Chronic anticoagulation   tolerating anticoagulation, continue Eliquis 5 mg twice daily      3. Long term current use of antiarrhythmic drug   stable EKG.  Increase sotalol to 80 mg twice daily.  Recheck EKG in 2 to 3 days        Plan:     Stable cardiac status.  Continue current medications.   in 3 months, sooner as needed.  Thank you for allowing us to participate in the care of your patient.     Electronically signed by RONAK Rose, 04/10/23, 11:48 AM EDT.    "

## 2023-04-13 RX ORDER — SOTALOL HYDROCHLORIDE 80 MG/1
80 TABLET ORAL 2 TIMES DAILY
Qty: 180 TABLET | Refills: 3 | Status: SHIPPED | OUTPATIENT
Start: 2023-04-13

## 2023-07-27 ENCOUNTER — HOSPITAL ENCOUNTER (OUTPATIENT)
Dept: CT IMAGING | Facility: HOSPITAL | Age: 73
Discharge: HOME | End: 2023-07-27
Admitting: FAMILY MEDICINE
Payer: MEDICARE

## 2023-07-27 DIAGNOSIS — R10.31 RLQ ABDOMINAL PAIN: ICD-10-CM

## 2023-07-27 LAB — CREAT BLDA-MCNC: 0.4 MG/DL (ref 0.6–1.3)

## 2023-07-27 PROCEDURE — 25510000001 IOPAMIDOL 61 % SOLUTION: Performed by: FAMILY MEDICINE

## 2023-07-27 PROCEDURE — 74177 CT ABD & PELVIS W/CONTRAST: CPT

## 2023-07-27 PROCEDURE — 82565 ASSAY OF CREATININE: CPT

## 2023-07-27 RX ADMIN — IOPAMIDOL 85 ML: 612 INJECTION, SOLUTION INTRAVENOUS at 10:32

## 2023-08-10 ENCOUNTER — OUTSIDE FACILITY SERVICE (OUTPATIENT)
Dept: GASTROENTEROLOGY | Facility: CLINIC | Age: 73
End: 2023-08-10
Payer: MEDICARE

## 2023-08-10 PROCEDURE — 88305 TISSUE EXAM BY PATHOLOGIST: CPT

## 2023-08-10 PROCEDURE — 45385 COLONOSCOPY W/LESION REMOVAL: CPT | Performed by: INTERNAL MEDICINE

## 2023-08-10 PROCEDURE — 99152 MOD SED SAME PHYS/QHP 5/>YRS: CPT | Performed by: INTERNAL MEDICINE

## 2023-08-11 ENCOUNTER — LAB REQUISITION (OUTPATIENT)
Dept: LAB | Facility: HOSPITAL | Age: 73
End: 2023-08-11
Payer: MEDICARE

## 2023-08-11 DIAGNOSIS — Z12.11 ENCOUNTER FOR SCREENING FOR MALIGNANT NEOPLASM OF COLON: ICD-10-CM

## 2023-08-14 LAB — REF LAB TEST METHOD: NORMAL

## 2023-09-21 ENCOUNTER — LAB (OUTPATIENT)
Dept: LAB | Facility: HOSPITAL | Age: 73
End: 2023-09-21

## 2023-09-21 ENCOUNTER — OFFICE VISIT (OUTPATIENT)
Dept: FAMILY MEDICINE CLINIC | Facility: CLINIC | Age: 73
End: 2023-09-21
Payer: MEDICARE

## 2023-09-21 VITALS
HEART RATE: 88 BPM | TEMPERATURE: 97.9 F | WEIGHT: 115.6 LBS | DIASTOLIC BLOOD PRESSURE: 66 MMHG | HEIGHT: 64 IN | BODY MASS INDEX: 19.74 KG/M2 | OXYGEN SATURATION: 98 % | SYSTOLIC BLOOD PRESSURE: 110 MMHG

## 2023-09-21 DIAGNOSIS — Z11.59 ENCOUNTER FOR HEPATITIS C SCREENING TEST FOR LOW RISK PATIENT: ICD-10-CM

## 2023-09-21 DIAGNOSIS — E53.8 B12 DEFICIENCY: ICD-10-CM

## 2023-09-21 DIAGNOSIS — Z00.00 MEDICARE ANNUAL WELLNESS VISIT, SUBSEQUENT: Primary | ICD-10-CM

## 2023-09-21 DIAGNOSIS — E55.9 VITAMIN D DEFICIENCY: ICD-10-CM

## 2023-09-21 DIAGNOSIS — E78.2 MIXED HYPERLIPIDEMIA: ICD-10-CM

## 2023-09-21 DIAGNOSIS — Z00.00 PREVENTATIVE HEALTH CARE: ICD-10-CM

## 2023-09-21 DIAGNOSIS — Z13.0 SCREENING FOR DEFICIENCY ANEMIA: ICD-10-CM

## 2023-09-21 DIAGNOSIS — Z13.29 SCREENING FOR ENDOCRINE DISORDER: ICD-10-CM

## 2023-09-21 DIAGNOSIS — R82.71 BACTERIURIA: ICD-10-CM

## 2023-09-21 NOTE — PROGRESS NOTES
The ABCs of the Annual Wellness Visit  Subsequent Medicare Wellness Visit    Subjective    Mar Joseph is a 72 y.o. female who presents for a Subsequent Medicare Wellness Visit.    The following portions of the patient's history were reviewed and   updated as appropriate: allergies, current medications, past family history, past medical history, past social history, past surgical history, and problem list.    Compared to one year ago, the patient feels her physical   health is the same.    Compared to one year ago, the patient feels her mental   health is the same.    Recent Hospitalizations:  She was not admitted to the hospital during the last year.       Current Medical Providers:  Patient Care Team:  Anand Pineda DO as PCP - General (Family Medicine)  Isiah Garcia DO as Consulting Physician (Cardiology)  Kirk Ocasio III, MD as Cardiologist (Cardiology)  Artur Figueredo PA as Physician Assistant (Cardiology)    Outpatient Medications Prior to Visit   Medication Sig Dispense Refill    acetaminophen (TYLENOL) 500 MG tablet Take 1 tablet by mouth Every 6 (Six) Hours As Needed for Mild Pain.      apixaban (Eliquis) 5 MG tablet tablet Take 1 tablet by mouth 2 (Two) Times a Day. 180 tablet 3    escitalopram (LEXAPRO) 20 MG tablet TAKE 1 TABLET EVERY DAY 90 tablet 3    sotalol (Betapace) 80 MG tablet Take 1 tablet by mouth 2 (Two) Times a Day. 180 tablet 3    traMADol (ULTRAM) 50 MG tablet Take 1 tablet by mouth 4 (Four) Times a Day.      travoprost, BAK free, (TRAVATAN) 0.004 % solution ophthalmic solution travoprost 0.004 % eye drops   Instill by ophthalmic route.      VITAMIN D PO Take 1,000 Int'l Units by mouth Daily.      Sod Picosulfate-Mag Ox-Cit Acd 10-3.5-12 MG-GM -GM/160ML solution Take 320 mL by mouth Take As Directed. Please follow instructions that were mailed to your home. If you did not receive these instructions please call our office at [phone]. 320 mL 0     No  "facility-administered medications prior to visit.       Opioid medication/s are on active medication list.  and I have evaluated her active treatment plan and pain score trends (see table).  Vitals:    09/21/23 1047   PainSc:   2   PainLoc: Back     I have reviewed the chart for potential of high risk medication and harmful drug interactions in the elderly.          Aspirin is not on active medication list.  Aspirin use is not indicated based on review of current medical condition/s. Risk of harm outweighs potential benefits.  .    Patient Active Problem List   Diagnosis    Paroxysmal atrial fibrillation    Tobacco use    Chronic colitis    Mixed hyperlipidemia    Anxiety    Chronic anticoagulation    Long term current use of antiarrhythmic drug    Spinal stenosis of lumbar region     Advance Care Planning   Advance Care Planning     Advance Directive is not on file.  ACP discussion was held with the patient during this visit. Patient has an advance directive (not in EMR), copy requested.     Objective    Vitals:    09/21/23 1047   BP: 110/66   BP Location: Left arm   Patient Position: Sitting   Cuff Size: Adult   Pulse: 88   Temp: 97.9 °F (36.6 °C)   TempSrc: Infrared   SpO2: 98%   Weight: 52.4 kg (115 lb 9.6 oz)   Height: 162.6 cm (64.02\")   PainSc:   2   PainLoc: Back     Estimated body mass index is 19.83 kg/m² as calculated from the following:    Height as of this encounter: 162.6 cm (64.02\").    Weight as of this encounter: 52.4 kg (115 lb 9.6 oz).    BMI is within normal parameters. No other follow-up for BMI required.      Does the patient have evidence of cognitive impairment? No  ATTENTION  What is the year: correct  What is the month of the year: correct  What is the day of the week?: correct  What is the date?: correct  MEMORY  Repeat address three times, only score third attempt: [address], [place]: 6  HOW MANY ANIMALS DID THE PATIENT NAME  Verbal Fluency -- Animal Names " (0-25): 22+  CLOCK DRAWING  Clock Drawing: All Correct  MEMORY RECALL  Tell me what you remember about that name and address we were repeating at the beginnin  ACE TOTAL SCORE  Total ACE Score - <25/30 strongly suggests cognitive impairment; <21/30 almost certainly shows dementia: 29   Lab Results   Component Value Date    CHLPL 268 (H) 2023    TRIG 82 2023     (H) 2023     (H) 2023    VLDL 13 2023        HEALTH RISK ASSESSMENT    Smoking Status:  Social History     Tobacco Use   Smoking Status Some Days    Packs/day: 0.25    Years: 15.00    Pack years: 3.75    Types: Cigarettes   Smokeless Tobacco Never     Alcohol Consumption:  Social History     Substance and Sexual Activity   Alcohol Use Yes    Alcohol/week: 2.0 standard drinks    Types: 1 Glasses of wine, 1 Cans of beer per week     Fall Risk Screen:    [facility] Fall Risk Assessment was completed, and patient is at LOW risk for falls.Assessment completed on:2023    Depression Screenin/21/2023    10:52 AM   PHQ-2/PHQ-9 Depression Screening   Little Interest or Pleasure in Doing Things 0-->not at all   Feeling Down, Depressed or Hopeless 0-->not at all   PHQ-9: Brief Depression Severity Measure Score 0       Health Habits and Functional and Cognitive Screenin/21/2023    10:52 AM   Functional & Cognitive Status   Do you have difficulty preparing food and eating? No   Do you have difficulty bathing yourself, getting dressed or grooming yourself? No   Do you have difficulty using the toilet? No   Do you have difficulty moving around from place to place? Yes   Do you have trouble with steps or getting out of a bed or a chair? Yes   Current Diet Well Balanced Diet   Dental Exam Up to date   Eye Exam Up to date   Exercise (times per week) 7 times per week   Current Exercises Include Stationary Bicycling/Spin Class;Light Weights   Do you need help using the phone?  No   Are you deaf or do you have  serious difficulty hearing?  Yes   Do you need help to go to places out of walking distance? Yes   Do you need help shopping? No   Do you need help preparing meals?  No   Do you need help with housework?  No   Do you need help with laundry? No   Do you need help taking your medications? No   Do you need help managing money? No   Do you ever drive or ride in a car without wearing a seat belt? No   Have you felt unusual stress, anger or loneliness in the last month? No   Who do you live with? Spouse   If you need help, do you have trouble finding someone available to you? No   Have you been bothered in the last four weeks by sexual problems? No   Do you have difficulty concentrating, remembering or making decisions? No       Age-appropriate Screening Schedule:  Refer to the list below for future screening recommendations based on patient's age, sex and/or medical conditions. Orders for these recommended tests are listed in the plan section. The patient has been provided with a written plan.    Health Maintenance   Topic Date Due    DXA SCAN  Never done    ZOSTER VACCINE (1 of 2) Never done    INFLUENZA VACCINE  08/01/2023    COVID-19 Vaccine (5 - 2023-24 season) 09/01/2023    MAMMOGRAM  09/29/2023 (Originally 11/10/2022)    Pneumococcal Vaccine 65+ (2 - PCV) 06/21/2024 (Originally 7/21/2021)    ANNUAL WELLNESS VISIT  09/21/2024    LIPID PANEL  09/21/2024    TDAP/TD VACCINES (2 - Td or Tdap) 02/02/2025    COLORECTAL CANCER SCREENING  08/10/2033    HEPATITIS C SCREENING  Completed                  CMS Preventative Services Quick Reference  Risk Factors Identified During Encounter  None Identified  The above risks/problems have been discussed with the patient.  Pertinent information has been shared with the patient in the After Visit Summary.  An After Visit Summary and PPPS were made available to the patient.    The wellness exam has been reviewed in detail.  The patient has been fully counseled on preventative  "guidelines for vaccines, cancer screenings, and other health maintenance needs.  Functional testing has been performed to assess capacity for independent living and need for other medical interventions.   The patient was counseled on maintaining a lifestyle to promote good health and to minimize chronic diseases.  The patient has been assisted with scheduling healthcare procedures for the coming year and given a written document outlining these recommendations.    Follow Up:   Next Medicare Wellness visit to be scheduled in 1 year.       Additional E&M Note during same encounter follows:  Patient has multiple medical problems which are significant and separately identifiable that require additional work above and beyond the Medicare Wellness Visit.      Chief Complaint  Medicare Wellness-subsequent    Subjective        HPI  Mar Joseph is also being seen today for annual preventative care.         Objective   Vital Signs:  /66 (BP Location: Left arm, Patient Position: Sitting, Cuff Size: Adult)   Pulse 88   Temp 97.9 °F (36.6 °C) (Infrared)   Ht 162.6 cm (64.02\")   Wt 52.4 kg (115 lb 9.6 oz)   SpO2 98%   BMI 19.83 kg/m²     Physical Exam   Const: NAD, A&Ox4, Pleasant, Cooperative  Eyes: EOMI, no conjunctivitis  ENT: No nasal discharge present, neck supple  Cardiac: Regular rate and rhythm, no cyanosis  Resp: Respiratory rate within normal limits, no increased work of breathing, no audible wheezing or retractions noted  GI: No distention or ascites  MSK: Motor and sensation grossly intact in bilateral upper extremities  Neurologic: CN II-XII grossly intact  Psych: Appropriate mood and behavior.  Skin: Pink, warm, dry                 Assessment and Plan   Diagnoses and all orders for this visit:    1. Medicare annual wellness visit, subsequent (Primary)  -     CBC (No Diff); Future  -     Lipid Panel; Future  -     Comprehensive Metabolic Panel; Future  -     Hepatitis C Antibody; Future  -     TSH; " Future  -     Vitamin B12; Future  -     Vitamin D 25 Hydroxy; Future  -     Vitamin D 25 Hydroxy  -     Vitamin B12  -     TSH  -     Hepatitis C Antibody  -     Comprehensive Metabolic Panel  -     Lipid Panel  -     CBC (No Diff)    2. Preventative health care  -     CBC (No Diff); Future  -     Lipid Panel; Future  -     Comprehensive Metabolic Panel; Future  -     Hepatitis C Antibody; Future  -     TSH; Future  -     Vitamin B12; Future  -     Vitamin D 25 Hydroxy; Future  -     Vitamin D 25 Hydroxy  -     Vitamin B12  -     TSH  -     Hepatitis C Antibody  -     Comprehensive Metabolic Panel  -     Lipid Panel  -     CBC (No Diff)    3. Mixed hyperlipidemia  -     Lipid Panel; Future  -     CBC (No Diff); Future  -     Lipid Panel; Future  -     Comprehensive Metabolic Panel; Future  -     Hepatitis C Antibody; Future  -     TSH; Future  -     Vitamin B12; Future  -     Vitamin D 25 Hydroxy; Future  -     Vitamin D 25 Hydroxy  -     Vitamin B12  -     TSH  -     Hepatitis C Antibody  -     Comprehensive Metabolic Panel  -     Lipid Panel  -     CBC (No Diff)    4. Screening for deficiency anemia  -     CBC (No Diff); Future  -     CBC (No Diff); Future  -     Lipid Panel; Future  -     Comprehensive Metabolic Panel; Future  -     Hepatitis C Antibody; Future  -     TSH; Future  -     Vitamin B12; Future  -     Vitamin D 25 Hydroxy; Future  -     Vitamin D 25 Hydroxy  -     Vitamin B12  -     TSH  -     Hepatitis C Antibody  -     Comprehensive Metabolic Panel  -     Lipid Panel  -     CBC (No Diff)    5. Screening for endocrine disorder  -     Comprehensive Metabolic Panel; Future  -     Urinalysis With Microscopic If Indicated (No Culture) - Urine, Clean Catch; Future  -     TSH; Future  -     CBC (No Diff); Future  -     Lipid Panel; Future  -     Comprehensive Metabolic Panel; Future  -     Hepatitis C Antibody; Future  -     TSH; Future  -     Vitamin B12; Future  -     Vitamin D 25 Hydroxy; Future  -      Vitamin D 25 Hydroxy  -     Vitamin B12  -     TSH  -     Hepatitis C Antibody  -     Comprehensive Metabolic Panel  -     Lipid Panel  -     CBC (No Diff)    6. Vitamin D deficiency  -     Vitamin D,25-Hydroxy; Future  -     CBC (No Diff); Future  -     Lipid Panel; Future  -     Comprehensive Metabolic Panel; Future  -     Hepatitis C Antibody; Future  -     TSH; Future  -     Vitamin B12; Future  -     Vitamin D 25 Hydroxy; Future  -     Vitamin D 25 Hydroxy; Future  -     Vitamin D 25 Hydroxy  -     Vitamin D 25 Hydroxy  -     Vitamin B12  -     TSH  -     Hepatitis C Antibody  -     Comprehensive Metabolic Panel  -     Lipid Panel  -     CBC (No Diff)    7. B12 deficiency  -     Vitamin B12; Future  -     CBC (No Diff); Future  -     Lipid Panel; Future  -     Comprehensive Metabolic Panel; Future  -     Hepatitis C Antibody; Future  -     TSH; Future  -     Vitamin B12; Future  -     Vitamin D 25 Hydroxy; Future  -     Vitamin D 25 Hydroxy  -     Vitamin B12  -     TSH  -     Hepatitis C Antibody  -     Comprehensive Metabolic Panel  -     Lipid Panel  -     CBC (No Diff)    8. Encounter for hepatitis C screening test for low risk patient  -     Hepatitis C Antibody; Future  -     CBC (No Diff); Future  -     Lipid Panel; Future  -     Comprehensive Metabolic Panel; Future  -     Hepatitis C Antibody; Future  -     TSH; Future  -     Vitamin B12; Future  -     Vitamin D 25 Hydroxy; Future  -     Vitamin D 25 Hydroxy  -     Vitamin B12  -     TSH  -     Hepatitis C Antibody  -     Comprehensive Metabolic Panel  -     Lipid Panel  -     CBC (No Diff)    9. Bacteriuria  -     nitrofurantoin, macrocrystal-monohydrate, (MACROBID) 100 MG capsule; Take 1 capsule by mouth 2 (Two) Times a Day for 5 days.  Dispense: 10 capsule; Refill: 0      Problem List Items Addressed This Visit          Cardiac and Vasculature    Mixed hyperlipidemia    Relevant Orders    Lipid Panel    CBC (No Diff) (Completed)    Lipid Panel  (Completed)    Comprehensive Metabolic Panel (Completed)    Hepatitis C Antibody (Completed)    TSH (Completed)    Vitamin B12 (Completed)    Vitamin D 25 Hydroxy     Other Visit Diagnoses       Medicare annual wellness visit, subsequent    -  Primary    Relevant Orders    CBC (No Diff) (Completed)    Lipid Panel (Completed)    Comprehensive Metabolic Panel (Completed)    Hepatitis C Antibody (Completed)    TSH (Completed)    Vitamin B12 (Completed)    Vitamin D 25 Hydroxy    Preventative health care        Relevant Orders    CBC (No Diff) (Completed)    Lipid Panel (Completed)    Comprehensive Metabolic Panel (Completed)    Hepatitis C Antibody (Completed)    TSH (Completed)    Vitamin B12 (Completed)    Vitamin D 25 Hydroxy    Screening for deficiency anemia        Relevant Orders    CBC (No Diff)    CBC (No Diff) (Completed)    Lipid Panel (Completed)    Comprehensive Metabolic Panel (Completed)    Hepatitis C Antibody (Completed)    TSH (Completed)    Vitamin B12 (Completed)    Vitamin D 25 Hydroxy    Screening for endocrine disorder        Relevant Orders    Comprehensive Metabolic Panel    Urinalysis With Microscopic If Indicated (No Culture) - Urine, Clean Catch    TSH    CBC (No Diff) (Completed)    Lipid Panel (Completed)    Comprehensive Metabolic Panel (Completed)    Hepatitis C Antibody (Completed)    TSH (Completed)    Vitamin B12 (Completed)    Vitamin D 25 Hydroxy    Vitamin D deficiency        Relevant Orders    Vitamin D,25-Hydroxy    CBC (No Diff) (Completed)    Lipid Panel (Completed)    Comprehensive Metabolic Panel (Completed)    Hepatitis C Antibody (Completed)    TSH (Completed)    Vitamin B12 (Completed)    Vitamin D 25 Hydroxy    Vitamin D 25 Hydroxy (Completed)    B12 deficiency        Relevant Orders    Vitamin B12    CBC (No Diff) (Completed)    Lipid Panel (Completed)    Comprehensive Metabolic Panel (Completed)    Hepatitis C Antibody (Completed)    TSH (Completed)    Vitamin B12  (Completed)    Vitamin D 25 Hydroxy    Encounter for hepatitis C screening test for low risk patient        Relevant Orders    Hepatitis C Antibody    CBC (No Diff) (Completed)    Lipid Panel (Completed)    Comprehensive Metabolic Panel (Completed)    Hepatitis C Antibody (Completed)    TSH (Completed)    Vitamin B12 (Completed)    Vitamin D 25 Hydroxy    Bacteriuria        Relevant Medications    nitrofurantoin, macrocrystal-monohydrate, (MACROBID) 100 MG capsule          We will change her from Lexapro to fluoxetine.  Recommend that she take half tab of Lexapro until the fluoxetine arrives.    Bacteria present with WBCs on microscopy.  Recommended treatment, Macrobid sent to her local pharmacy.    The preventative exam has been reviewed in detail.  The patient has been fully counseled on preventative guidelines for vaccines, cancer screenings, and other health maintenance needs. The patient was counseled on maintaining a lifestyle to promote good health and to minimize chronic diseases.  The patient has been assisted with scheduling healthcare procedures for the coming year and given a written document outlining these recommendations. Age-appropriate screening measures have been ordered for the patient today as indicated above.         Follow Up   Return in about 1 year (around 9/21/2024) for Medicare Wellness.  Patient was given instructions and counseling regarding her condition or for health maintenance advice. Please see specific information pulled into the AVS if appropriate.

## 2023-09-21 NOTE — PATIENT INSTRUCTIONS
Take 1/2 tab lexapro until fluoxetine shows up        Advance Care Planning and Advance Directives     You make decisions on a daily basis - decisions about where you want to live, your career, your home, your life. Perhaps one of the most important decisions you face is your choice for future medical care. Take time to talk with your family and your healthcare team and start planning today.  Advance Care Planning is a process that can help you:  Understand possible future healthcare decisions in light of your own experiences  Reflect on those decision in light of your goals and values  Discuss your decisions with those closest to you and the healthcare professionals that care for you  Make a plan by creating a document that reflects your wishes    Surrogate Decision Maker  In the event of a medical emergency, which has left you unable to communicate or to make your own decisions, you would need someone to make decisions for you.  It is important to discuss your preferences for medical treatment with this person while you are in good health.     Qualities of a surrogate decision maker:  Willing to take on this role and responsibility  Knows what you want for future medical care  Willing to follow your wishes even if they don't agree with them  Able to make difficult medical decisions under stressful circumstances    Advance Directives  These are legal documents you can create that will guide your healthcare team and decision maker(s) when needed. These documents can be stored in the electronic medical record.    Living Will - a legal document to guide your care if you have a terminal condition or a serious illness and are unable to communicate. States vary by statute in document names/types, but most forms may include one or more of the following:        -  Directions regarding life-prolonging treatments        -  Directions regarding artificially provided nutrition/hydration        -  Choosing a healthcare decision  maker        -  Direction regarding organ/tissue donation    Durable Power of  for Healthcare - this document names an -in-fact to make medical decisions for you, but it may also allow this person to make personal and financial decisions for you. Please seek the advice of an  if you need this type of document.    **Advance Directives are not required and no one may discriminate against you if you do not sign one.    Medical Orders  Many states allow specific forms/orders signed by your physician to record your wishes for medical treatment in your current state of health. This form, signed in personal communication with your physician, addresses resuscitation and other medical interventions that you may or may not want.      For more information or to schedule a time with a [facility] Advance Care Planning Facilitator contact: [facility] or call [phone] and someone will contact you directly.  You are due for Shingrix vaccination series ( the newest shingles vaccine).  It is a two shot series spaced 2-6 months apart. Please get this vaccine series started at your earliest convenience at your local pharmacy to help avoid shingles outbreak. It is more effective than the old Zostavax vaccine and is recommended even if you have had the Zostavax vaccine in the past.  Once the Shingrix series is completed, it does not need to be repeated.   For more information, please look at the website below:  [facility] Shingrix Vaccine Information      Medicare Wellness  Personal Prevention Plan of Service     Date of Office Visit:    Encounter Provider:  Anand Pineda DO  Place of Service:  [facility] PRIMARY CARE  Patient Name: Mar Joseph  :  1950    As part of the Medicare Wellness portion of your visit today, we are providing you with this personalized preventive plan of services (PPPS). This plan is based upon recommendations of the [place] Preventive  Services Task Force (USPSTF) and the Advisory Committee on Immunization Practices (ACIP).    This lists the preventive care services that should be considered, and provides dates of when you are due. Items listed as completed are up-to-date and do not require any further intervention.    Health Maintenance   Topic Date Due   • DXA SCAN  Never done   • ZOSTER VACCINE (1 of 2) Never done   • HEPATITIS C SCREENING  Never done   • LIPID PANEL  09/20/2023   • MAMMOGRAM  09/29/2023 (Originally 11/10/2022)   • Pneumococcal Vaccine 65+ (2 - PCV) 06/21/2024 (Originally 7/21/2021)   • COVID-19 Vaccine (5 - Pfizer series) 06/23/2024 (Originally 6/25/2022)   • INFLUENZA VACCINE  10/01/2023   • ANNUAL WELLNESS VISIT  09/21/2024   • TDAP/TD VACCINES (2 - Td or Tdap) 02/02/2025   • COLORECTAL CANCER SCREENING  08/10/2033       Orders Placed This Encounter   Procedures   • CBC (No Diff)     Standing Status:   Future     Standing Expiration Date:   9/21/2024     Order Specific Question:   Release to patient     Answer:   Routine Release [[id number]]   • Lipid Panel     Standing Status:   Future     Standing Expiration Date:   9/21/2024     Order Specific Question:   Release to patient     Answer:   Routine Release [[id number]]   • Comprehensive Metabolic Panel     Standing Status:   Future     Standing Expiration Date:   9/21/2024     Order Specific Question:   Release to patient     Answer:   Routine Release [[id number]]   • Urinalysis With Microscopic If Indicated (No Culture) - Urine, Clean Catch     Standing Status:   Future     Standing Expiration Date:   9/21/2024     Order Specific Question:   Release to patient     Answer:   Routine Release [[id number]]   • TSH     Standing Status:   Future     Standing Expiration Date:   9/21/2024     Order Specific Question:   Release to patient     Answer:   Routine Release [[id number]]   • Vitamin D,25-Hydroxy     Standing Status:   Future     Standing Expiration Date:   9/21/2024      Order Specific Question:   Release to patient     Answer:   Routine Release [[id number]]   • Vitamin B12     Standing Status:   Future     Standing Expiration Date:   9/21/2024     Order Specific Question:   Release to patient     Answer:   Routine Release [[id number]]   • Hepatitis C Antibody     Standing Status:   Future     Standing Expiration Date:   9/21/2024     Order Specific Question:   Release to patient     Answer:   Routine Release [[id number]]       Return in about 1 year (around 9/21/2024) for Medicare Wellness.

## 2023-09-22 LAB
25(OH)D3+25(OH)D2 SERPL-MCNC: 35.3 NG/ML (ref 30–100)
ALBUMIN SERPL-MCNC: 4.9 G/DL (ref 3.5–5.2)
ALBUMIN/GLOB SERPL: 2.2 G/DL
ALP SERPL-CCNC: 66 U/L (ref 39–117)
ALT SERPL-CCNC: 10 U/L (ref 1–33)
AST SERPL-CCNC: 16 U/L (ref 1–32)
BILIRUB SERPL-MCNC: 0.5 MG/DL (ref 0–1.2)
BUN SERPL-MCNC: 13 MG/DL (ref 8–23)
BUN/CREAT SERPL: 26 (ref 7–25)
CALCIUM SERPL-MCNC: 10.1 MG/DL (ref 8.6–10.5)
CHLORIDE SERPL-SCNC: 97 MMOL/L (ref 98–107)
CHOLEST SERPL-MCNC: 268 MG/DL (ref 0–200)
CO2 SERPL-SCNC: 25.2 MMOL/L (ref 22–29)
CREAT SERPL-MCNC: 0.5 MG/DL (ref 0.57–1)
EGFRCR SERPLBLD CKD-EPI 2021: 100.4 ML/MIN/1.73
ERYTHROCYTE [DISTWIDTH] IN BLOOD BY AUTOMATED COUNT: 11.8 % (ref 12.3–15.4)
GLOBULIN SER CALC-MCNC: 2.2 GM/DL
GLUCOSE SERPL-MCNC: 94 MG/DL (ref 65–99)
HCT VFR BLD AUTO: 39.2 % (ref 34–46.6)
HCV IGG SERPL QL IA: NON REACTIVE
HDLC SERPL-MCNC: 104 MG/DL (ref 40–60)
HGB BLD-MCNC: 13.3 G/DL (ref 12–15.9)
LDLC SERPL CALC-MCNC: 151 MG/DL (ref 0–100)
MCH RBC QN AUTO: 32.9 PG (ref 26.6–33)
MCHC RBC AUTO-ENTMCNC: 33.9 G/DL (ref 31.5–35.7)
MCV RBC AUTO: 97 FL (ref 79–97)
PLATELET # BLD AUTO: 246 10*3/MM3 (ref 140–450)
POTASSIUM SERPL-SCNC: 4.7 MMOL/L (ref 3.5–5.2)
PROT SERPL-MCNC: 7.1 G/DL (ref 6–8.5)
RBC # BLD AUTO: 4.04 10*6/MM3 (ref 3.77–5.28)
SODIUM SERPL-SCNC: 136 MMOL/L (ref 136–145)
TRIGL SERPL-MCNC: 82 MG/DL (ref 0–150)
TSH SERPL DL<=0.005 MIU/L-ACNC: 1.55 UIU/ML (ref 0.27–4.2)
VIT B12 SERPL-MCNC: 455 PG/ML (ref 211–946)
VLDLC SERPL CALC-MCNC: 13 MG/DL (ref 5–40)
WBC # BLD AUTO: 6.06 10*3/MM3 (ref 3.4–10.8)

## 2023-09-23 LAB
APPEARANCE UR: CLEAR
BACTERIA #/AREA URNS HPF: ABNORMAL /[HPF]
BILIRUB UR QL STRIP: NEGATIVE
CASTS URNS QL MICRO: ABNORMAL /LPF
COLOR UR: YELLOW
EPI CELLS #/AREA URNS HPF: ABNORMAL /HPF (ref 0–10)
GLUCOSE UR QL STRIP: NEGATIVE
HGB UR QL STRIP: NEGATIVE
KETONES UR QL STRIP: NEGATIVE
LEUKOCYTE ESTERASE UR QL STRIP: ABNORMAL
MICRO URNS: ABNORMAL
NITRITE UR QL STRIP: NEGATIVE
PH UR STRIP: 7 [PH] (ref 5–7.5)
PROT UR QL STRIP: NEGATIVE
RBC #/AREA URNS HPF: ABNORMAL /HPF (ref 0–2)
SP GR UR STRIP: 1.02 (ref 1–1.03)
UROBILINOGEN UR STRIP-MCNC: 0.2 MG/DL (ref 0.2–1)
WBC #/AREA URNS HPF: ABNORMAL /HPF (ref 0–5)

## 2023-09-29 RX ORDER — NITROFURANTOIN 25; 75 MG/1; MG/1
100 CAPSULE ORAL 2 TIMES DAILY
Qty: 10 CAPSULE | Refills: 0 | Status: SHIPPED | OUTPATIENT
Start: 2023-09-29 | End: 2023-10-04

## 2023-10-01 ENCOUNTER — PATIENT MESSAGE (OUTPATIENT)
Dept: FAMILY MEDICINE CLINIC | Facility: CLINIC | Age: 73
End: 2023-10-01
Payer: MEDICARE

## 2023-10-01 DIAGNOSIS — F32.A DEPRESSION, UNSPECIFIED DEPRESSION TYPE: Primary | ICD-10-CM

## 2023-10-02 RX ORDER — FLUOXETINE 20 MG/1
20 TABLET, FILM COATED ORAL DAILY
Qty: 90 TABLET | Refills: 2 | Status: SHIPPED | OUTPATIENT
Start: 2023-10-02

## 2023-10-02 RX ORDER — FLUOXETINE 20 MG/1
20 TABLET, FILM COATED ORAL DAILY
Qty: 30 TABLET | Refills: 0 | Status: SHIPPED | OUTPATIENT
Start: 2023-10-02 | End: 2023-11-01

## 2024-01-09 ENCOUNTER — TELEPHONE (OUTPATIENT)
Dept: CARDIOLOGY | Facility: CLINIC | Age: 74
End: 2024-01-09
Payer: MEDICARE

## 2024-01-09 NOTE — TELEPHONE ENCOUNTER
Caller: Mar Joseph    Relationship: Self    Best call back number: [phone]    Requested Prescriptions:   Requested Prescriptions     Pending Prescriptions Disp Refills    apixaban (Eliquis) 5 MG tablet tablet 180 tablet 3     Sig: Take 1 tablet by mouth 2 (Two) Times a Day.        Pharmacy where request should be sent: [facility] [id number] [id number] [address] - [phone]  - [phone] FX     Last office visit with prescribing clinician: 4/10/2023   Last telemedicine visit with prescribing clinician: Visit date not found   Next office visit with prescribing clinician: Visit date not found     Additional details provided by patient:     Does the patient have less than a 3 day supply:  [] Yes  [x] No    Would you like a call back once the refill request has been completed: [x] Yes [] No    If the office needs to give you a call back, can they leave a voicemail: [x] Yes [] No    Fredy Meyers Rep   01/09/24 11:07 EST

## 2024-01-26 ENCOUNTER — OFFICE VISIT (OUTPATIENT)
Dept: CARDIOLOGY | Facility: CLINIC | Age: 74
End: 2024-01-26
Payer: MEDICARE

## 2024-01-26 VITALS
HEART RATE: 70 BPM | SYSTOLIC BLOOD PRESSURE: 92 MMHG | HEIGHT: 64 IN | WEIGHT: 114 LBS | OXYGEN SATURATION: 99 % | DIASTOLIC BLOOD PRESSURE: 46 MMHG | BODY MASS INDEX: 19.46 KG/M2

## 2024-01-26 DIAGNOSIS — I48.0 PAROXYSMAL ATRIAL FIBRILLATION: Primary | ICD-10-CM

## 2024-01-26 PROCEDURE — 99213 OFFICE O/P EST LOW 20 MIN: CPT | Performed by: INTERNAL MEDICINE

## 2024-01-26 RX ORDER — SODIUM PICOSULFATE, MAGNESIUM OXIDE, AND ANHYDROUS CITRIC ACID 12; 3.5; 1 G/175ML; G/175ML; MG/175ML
LIQUID ORAL
COMMUNITY

## 2024-01-26 RX ORDER — GABAPENTIN 300 MG/1
300 CAPSULE ORAL 2 TIMES DAILY
COMMUNITY
Start: 2023-12-29

## 2024-01-26 RX ORDER — FLUTICASONE PROPIONATE 50 MCG
2 SPRAY, SUSPENSION (ML) NASAL DAILY
COMMUNITY
Start: 2023-12-14

## 2024-01-26 NOTE — PROGRESS NOTES
"[facility]  Office visit  Mar Joseph  1950    [phone] (work)    VISIT DATE:  1/26/2024    PCP: Anand Pineda DO  [id number]  [facility] [address]    CC:  Chief Complaint   Patient presents with    PAF    Hyperlipidemia       Previous cardiac studies and procedures:  October 2019  Transesophageal echocardiogram with successful cardioversion  Left ventricular systolic function is normal. Estimated EF = 60%.  Mild to moderate biatrial enlargement.  No evidence of intracardiac thrombus or mass, clear left atrial appendage.  No significant valvular disease.    June 2020 A. fib ablation.    ASSESSMENT:   Diagnosis Plan   1. Paroxysmal atrial fibrillation              PLAN:  Continue Eliquis 5 mg p.o. twice daily  Continue sotalol 40 mg p.o. daily    Hyperlipidemia: Excellent HDL level, mildly elevated LDL level.  Nonobstructive calcific atherosclerosis visualized in the descending aorta and proximal iliacs.  Continue heart healthy diet and regular exercise.  Not recommending pharmacologic therapy at this time.      Subjective  History of paroxysmal atrial fibrillation, initial episode in 2013.  Maintaining active lifestyle, exercising at the [facility] on a regular basis.  No recurrent symptoms concerning for atrial fibrillation since her ablation.     PHYSICAL EXAMINATION:  Vitals:    01/26/24 1024   Height: 162.6 cm (64.02\")     General Appearance:    Alert, cooperative, no distress, appears stated age   Head:    Normocephalic, without obvious abnormality, atraumatic   Eyes:    conjunctiva/corneas clear   Nose:   Nares normal, septum midline, mucosa normal, no drainage   Throat:   Lips, teeth and gums normal   Neck:   Supple, symmetrical, trachea midline, no carotid    bruit or JVD   Lungs:     Clear to auscultation bilaterally, respirations unlabored   Chest Wall:    No tenderness or deformity    Heart:   Irregularly irregular, S1 and S2 normal, no " murmur, rub   or gallop, normal carotid impulse bilaterally without bruit.   Abdomen:     Soft, non-tender   Extremities:   Extremities normal, atraumatic, no cyanosis or edema   Pulses:   2+ and symmetric all extremities   Skin:   Skin color, texture, turgor normal, no rashes or lesions       Diagnostic Data:  Procedures  Lab Results   Component Value Date    CHLPL 268 (H) 09/21/2023    TRIG 82 09/21/2023     (H) 09/21/2023     Lab Results   Component Value Date    GLUCOSE 94 09/21/2023    BUN 13 09/21/2023    CREATININE 0.50 (L) 09/21/2023     09/21/2023    K 4.7 09/21/2023    CL 97 (L) 09/21/2023    CO2 25.2 09/21/2023     Lab Results   Component Value Date    HGBA1C 5.30 07/28/2021     Lab Results   Component Value Date    WBC 6.06 09/21/2023    HGB 13.3 09/21/2023    HCT 39.2 09/21/2023     09/21/2023       Allergies  Allergies   Allergen Reactions    Hydrocodone-Acetaminophen Nausea Only    Ibuprofen GI Bleeding     Patient does not take due to being on blood thinners    Hydrocodone GI Intolerance    Moxifloxacin Unknown (See Comments)    Penicillins Hives    Sulfa Antibiotics Nausea Only     nausea       Current Medications    Current Outpatient Medications:     acetaminophen (TYLENOL) 500 MG tablet, Take 1 tablet by mouth Every 6 (Six) Hours As Needed for Mild Pain., Disp: , Rfl:     apixaban (Eliquis) 5 MG tablet tablet, Take 1 tablet by mouth 2 (Two) Times a Day., Disp: 180 tablet, Rfl: 0    FLUoxetine (PROzac) 20 MG tablet, Take 1 tablet by mouth Daily., Disp: 90 tablet, Rfl: 2    FLUoxetine (PROzac) 20 MG tablet, Take 1 tablet by mouth Daily for 30 days., Disp: 30 tablet, Rfl: 0    sotalol (Betapace) 80 MG tablet, Take 1 tablet by mouth 2 (Two) Times a Day., Disp: 180 tablet, Rfl: 3    traMADol (ULTRAM) 50 MG tablet, Take 1 tablet by mouth 4 (Four) Times a Day., Disp: , Rfl:     travoprost, BAK free, (TRAVATAN) 0.004 % solution ophthalmic solution, travoprost 0.004 % eye drops   Instill by ophthalmic route., Disp: , Rfl:     VITAMIN D PO, Take 1,000 Int'l Units by mouth Daily., Disp: , Rfl:           ROS  Review of Systems   Constitutional: Positive for malaise/fatigue.   Cardiovascular:  Positive for irregular heartbeat and palpitations.   Respiratory:  Negative for cough and shortness of breath.        SOCIAL HX  Social History     Socioeconomic History    Marital status:    Tobacco Use    Smoking status: Some Days     Packs/day: 0.25     Years: 15.00     Additional pack years: 0.00     Total pack years: 3.75     Types: Cigarettes    Smokeless tobacco: Never   Vaping Use    Vaping Use: Never used   Substance and Sexual Activity    Alcohol use: Yes     Alcohol/week: 2.0 standard drinks of alcohol     Types: 1 Glasses of wine, 1 Cans of beer per week    Drug use: Never    Sexual activity: Not Currently     Partners: Male       FAMILY HX  Family History   Problem Relation Age of Onset    Asthma Mother     No Known Problems Father     Heart disease Sister     Cancer Brother     No Known Problems Maternal Grandmother     No Known Problems Maternal Grandfather     No Known Problems Paternal Grandmother     No Known Problems Paternal Grandfather     No Known Problems Brother     Heart disease Other     Heart attack Other     Diabetes Other              Kirk Ocasio III, MD, FACC

## 2024-03-18 ENCOUNTER — OFFICE VISIT (OUTPATIENT)
Dept: CARDIOLOGY | Facility: CLINIC | Age: 74
End: 2024-03-18
Payer: MEDICARE

## 2024-03-18 ENCOUNTER — PATIENT ROUNDING (BHMG ONLY) (OUTPATIENT)
Dept: CARDIOLOGY | Facility: CLINIC | Age: 74
End: 2024-03-18
Payer: MEDICARE

## 2024-03-18 VITALS
DIASTOLIC BLOOD PRESSURE: 58 MMHG | WEIGHT: 114 LBS | SYSTOLIC BLOOD PRESSURE: 116 MMHG | HEART RATE: 61 BPM | HEIGHT: 64 IN | OXYGEN SATURATION: 98 % | BODY MASS INDEX: 19.46 KG/M2

## 2024-03-18 DIAGNOSIS — I48.0 PAROXYSMAL ATRIAL FIBRILLATION: Primary | ICD-10-CM

## 2024-03-18 DIAGNOSIS — Z79.01 CHRONIC ANTICOAGULATION: ICD-10-CM

## 2024-03-18 DIAGNOSIS — Z79.899 LONG TERM CURRENT USE OF ANTIARRHYTHMIC DRUG: ICD-10-CM

## 2024-03-18 PROCEDURE — 99213 OFFICE O/P EST LOW 20 MIN: CPT | Performed by: PHYSICIAN ASSISTANT

## 2024-03-18 RX ORDER — GABAPENTIN 400 MG/1
CAPSULE ORAL 2 TIMES DAILY
COMMUNITY
Start: 2024-03-02

## 2024-03-18 RX ORDER — SOTALOL HYDROCHLORIDE 80 MG/1
80 TABLET ORAL 2 TIMES DAILY
Qty: 180 TABLET | Refills: 3 | Status: SHIPPED | OUTPATIENT
Start: 2024-03-18

## 2024-03-18 NOTE — PROGRESS NOTES
Encounter Date:03/18/2024      Patient ID: Mar Joseph is a 73 y.o. female.    Anand Pineda DO Cheif Complaint EP: Atrial Fibrillation    PROBLEM LIST:  Patient Active Problem List    Diagnosis Date Noted    Paroxysmal atrial fibrillation 01/13/2017     Priority: High     Note Last Updated: 3/18/2024     Diagnosed 2013  Recurrence with YOLANDA/ECV 10/16/2019 / LVEF 60% / LA moderately dilated   EPs and PVI 6/26/2020 Dr. Garcia  Sotalol restarted secondary to recurrent palpitations      Mixed hyperlipidemia 10/11/2019     Priority: Medium    Chronic anticoagulation 07/11/2022     Priority: Low    Long term current use of antiarrhythmic drug 07/11/2022     Priority: Low    Tobacco use 01/13/2017     Priority: Low    Spinal stenosis of lumbar region 10/13/2022    Anxiety 11/18/2019    Chronic colitis 01/13/2017               History of Present Illness  Patient presents today for follow-up with a history of paroxysmal atrial fibrillation on long-term anticoagulation and chronic antiarrhythmic.  Returns today for annual electrophysiology follow-up.  Mostly she is doing well.  She states she has 3-4 breakthroughs in the last year the last being 3 to 4 months ago.  Symptoms may last up to an hour and she goes and lies down and rests.  She does not check her blood pressure at home.  She states compliance with her current medical regimen request refills of sotalol and Eliquis.    Allergies   Allergen Reactions    Hydrocodone-Acetaminophen Nausea Only    Ibuprofen GI Bleeding     Patient does not take due to being on blood thinners    Hydrocodone GI Intolerance    Moxifloxacin Unknown (See Comments)    Penicillins Hives    Sulfa Antibiotics Nausea Only     nausea       Current Outpatient Medications   Medication Instructions    acetaminophen (TYLENOL) 500 mg, Oral, Every 6 Hours PRN    apixaban (ELIQUIS) 5 mg, Oral, 2 Times Daily    FLUoxetine (PROZAC) 20 mg, Oral, Daily         gabapentin (NEURONTIN) 400 MG  "capsule Take  by mouth 2 (Two) Times a Day.    multivitamin with minerals (MULTIVITAMIN ADULTS 50+ PO) 1 tablet, Oral, 2 Times Daily    sotalol (BETAPACE) 80 mg, Oral, 2 Times Daily    traMADol (ULTRAM) 50 mg, Oral, 4 Times Daily    travoprost, BAK free, (TRAVATAN) 0.004 % solution ophthalmic solution travoprost 0.004 % eye drops   Instill by ophthalmic route.       .    Objective:     /58 (BP Location: Left arm, Patient Position: Sitting)   Pulse 61   Ht 162.6 cm (64\")   Wt 51.7 kg (114 lb)   LMP  (LMP Unknown)   SpO2 98%   BMI 19.57 kg/m²    Body mass index is 19.57 kg/m².     Constitutional:       Appearance: Well-developed.   Pulmonary:      Effort: Pulmonary effort is normal. No respiratory distress.      Breath sounds: Normal breath sounds. No wheezing. No rales.      Comments: Bases clear  Chest:      Chest wall: Not tender to palpatation.   Cardiovascular:      Normal rate. Regular rhythm.      Murmurs: There is no murmur.      No gallop.  No click. No rub.   Pulses:     Intact distal pulses.   Edema:     Peripheral edema absent.   Musculoskeletal: Normal range of motion.       Lab Review:     Lab Results   Component Value Date    GLUCOSE 94 09/21/2023    BUN 13 09/21/2023    CREATININE 0.50 (L) 09/21/2023    EGFRRESULT 100.4 09/21/2023    BCR 26.0 (H) 09/21/2023    K 4.7 09/21/2023    CO2 25.2 09/21/2023    CALCIUM 10.1 09/21/2023    PROTENTOTREF 7.1 09/21/2023    ALBUMIN 4.9 09/21/2023    BILITOT 0.5 09/21/2023    AST 16 09/21/2023    ALT 10 09/21/2023     Lab Results   Component Value Date    WBC 6.06 09/21/2023    HGB 13.3 09/21/2023    HCT 39.2 09/21/2023    MCV 97.0 09/21/2023     09/21/2023     Lab Results   Component Value Date    TSH 1.550 09/21/2023           Procedures               Assessment:      Diagnosis Plan   1. Paroxysmal atrial fibrillation  Rare breakthrough symptoms.  She may take an extra half sotalol as needed symptoms      2. Long term current use of antiarrhythmic " drug  Stable EKG, refilled sotalol at current dose      3. Chronic anticoagulation  Tolerating anticoagulation, refilled Eliquis at current dose        Plan:     Advance Care Planning   ACP discussion was declined by the patient. Patient has an advance directive in EMR which is still valid.            Stable cardiac status.  Continue current medications.   in 1 year, sooner as needed.  Thank you for allowing us to participate in the care of your patient.     Electronically signed by RONAK Rose, 03/18/24, 11:16 AM EDT.

## 2024-03-20 ENCOUNTER — OFFICE VISIT (OUTPATIENT)
Dept: ORTHOPEDIC SURGERY | Facility: CLINIC | Age: 74
End: 2024-03-20
Payer: MEDICARE

## 2024-03-20 VITALS
BODY MASS INDEX: 18.78 KG/M2 | HEIGHT: 64 IN | WEIGHT: 110 LBS | DIASTOLIC BLOOD PRESSURE: 84 MMHG | SYSTOLIC BLOOD PRESSURE: 110 MMHG

## 2024-03-20 DIAGNOSIS — M16.12 PRIMARY OSTEOARTHRITIS OF LEFT HIP: Primary | ICD-10-CM

## 2024-03-20 PROCEDURE — 1160F RVW MEDS BY RX/DR IN RCRD: CPT | Performed by: ORTHOPAEDIC SURGERY

## 2024-03-20 PROCEDURE — 99204 OFFICE O/P NEW MOD 45 MIN: CPT | Performed by: ORTHOPAEDIC SURGERY

## 2024-03-20 PROCEDURE — 1159F MED LIST DOCD IN RCRD: CPT | Performed by: ORTHOPAEDIC SURGERY

## 2024-03-20 NOTE — PROGRESS NOTES
[facility]    Subjective     Chief Complaint   Patient presents with    Left Hip - Pain, Initial Evaluation        HPI    Mar Joseph is a 73 y.o. female who presents with new problem of: left hip pain.  Onset: atraumatic and gradual in nature. The issue has been ongoing for 1 year(s). Pain is a 5/10 on the pain scale. Pain is described as aching. Associated symptoms include pain. The pain is worse with walking, standing, sleeping, and lying on affected side; resting improve the pain. Previous treatments have included: NSAIDS, physical therapy, and oral steroids.  Complaining of pain both on the posterior and lateral aspect of the hip.  She has known history of back issues.    I have reviewed the following portions of the patient's history and agree with: History of Present Illness and Review of Systems    Patient Active Problem List   Diagnosis    Paroxysmal atrial fibrillation    Tobacco use    Chronic colitis    Mixed hyperlipidemia    Anxiety    Chronic anticoagulation    Long term current use of antiarrhythmic drug    Spinal stenosis of lumbar region     Past Medical History:   Diagnosis Date    A-fib     Abnormal ECG     Allergic 36825    Arrhythmia     Arthritis     Arthritis of back     Back problem     Bronchitis     Cervical disc disorder     Chronic alcohol use     Chronic colitis     Fracture of hip     Fracture, femur     Glaucoma Years ago    Heart disease     Hip arthrosis     History of cardioversion     History of transesophageal echocardiography (YOLANDA)     Inflammatory bowel disease Years ago    Low back pain Years ago    Low back strain     Lumbosacral disc disease     Menopause     Mixed hyperlipidemia 10/11/2019    Palpitation     Benign palpitations    Scoliosis     Tibia/fibula fracture     Fall with right tibia/fibula fracture, status post tibia IM harish, September 2013.    Tobacco use     Visual impairment Years ago    Wears glasses       Past Surgical History:    Procedure Laterality Date    ABLATION OF DYSRHYTHMIC FOCUS      CARDIAC ELECTROPHYSIOLOGY PROCEDURE N/A 06/26/2020    Procedure: PVA (paroxysmal), hold Sotalol 3 days, DNS Eliquis Rhythmia (BSC) only using direct sense;  Surgeon: Isiah Garcia DO;  Location: [facility] INVASIVE LOC[facility];  Service: Cardiovascular;  Laterality: N/A;    CARDIOVERSION      COLONOSCOPY  2015    HAND SURGERY      LEG SURGERY Right     states five breaks, steal harish in R leg    TRIGGER POINT INJECTION        Family History   Problem Relation Age of Onset    Asthma Mother     No Known Problems Father     Heart disease Sister     Cancer Brother     No Known Problems Maternal Grandmother     No Known Problems Maternal Grandfather     No Known Problems Paternal Grandmother     No Known Problems Paternal Grandfather     No Known Problems Brother     Heart disease Other     Heart attack Other     Diabetes Other      Social History     Socioeconomic History    Marital status:    Tobacco Use    Smoking status: Some Days     Current packs/day: 0.25     Average packs/day: 0.3 packs/day for 67.2 years (16.8 ttl pk-yrs)     Types: Cigarettes     Start date: 1/1/1972     Passive exposure: Never    Smokeless tobacco: Never   Vaping Use    Vaping status: Never Used   Substance and Sexual Activity    Alcohol use: Yes     Alcohol/week: 2.0 standard drinks of alcohol     Types: 1 Glasses of wine, 1 Cans of beer per week     Comment: WEEKLY    Drug use: Never    Sexual activity: Not Currently     Partners: Male      Current Outpatient Medications on File Prior to Visit   Medication Sig Dispense Refill    acetaminophen (TYLENOL) 500 MG tablet Take 1 tablet by mouth Every 6 (Six) Hours As Needed for Mild Pain.      apixaban (Eliquis) 5 MG tablet tablet Take 1 tablet by mouth 2 (Two) Times a Day. 180 tablet 3    FLUoxetine (PROzac) 20 MG tablet Take 1 tablet by mouth Daily. 90 tablet 2    gabapentin (NEURONTIN) 400 MG capsule Take  by mouth 2 (Two)  "Times a Day.      multivitamin with minerals (MULTIVITAMIN ADULTS 50+ PO) Take 1 tablet by mouth 2 (Two) Times a Day.      sotalol (Betapace) 80 MG tablet Take 1 tablet by mouth 2 (Two) Times a Day. 180 tablet 3    traMADol (ULTRAM) 50 MG tablet Take 1 tablet by mouth 4 (Four) Times a Day.      travoprost, REBEKA free, (TRAVATAN) 0.004 % solution ophthalmic solution travoprost 0.004 % eye drops   Instill by ophthalmic route.       No current facility-administered medications on file prior to visit.      Allergies   Allergen Reactions    Hydrocodone-Acetaminophen Nausea Only    Ibuprofen GI Bleeding     Patient does not take due to being on blood thinners    Hydrocodone GI Intolerance    Moxifloxacin Unknown (See Comments)    Penicillins Hives    Sulfa Antibiotics Nausea Only     nausea        Review of Systems   Constitutional: Negative.    HENT: Negative.     Eyes: Negative.    Respiratory: Negative.     Cardiovascular: Negative.    Gastrointestinal: Negative.    Endocrine: Negative.    Genitourinary: Negative.    Musculoskeletal: Negative.    Skin: Negative.    Allergic/Immunologic: Negative.    Neurological: Negative.    Hematological: Negative.    Psychiatric/Behavioral: Negative.          Objective      Physical Exam  /84   Ht 162.6 cm (64.02\")   Wt 49.9 kg (110 lb)   LMP  (LMP Unknown)   BMI 18.87 kg/m²     Body mass index is 18.87 kg/m².    General:   Mental Status:  Alert   Appearance: Cooperative, in no acute distress   Build and Nutrition: Thin female   Orientation: Alert and oriented to person, place and time   Posture: Normal   Gait: Limp on the left, leaning forward    Integument:   Left hip: No skin lesions, no rash, no ecchymosis    Neurologic:   Sensation:    Left foot: Intact to light touch on the dorsal and plantar aspect   Motor:  Left lower extremity: 5/5 quadriceps, hamstrings, ankle dorsiflexors, and ankle plantar flexors  Vascular:   Left lower extremity: 2+ dorsalis pedis pulse, prompt " capillary refill    Lower Extremity:   Left Hip:    Tenderness:  None    Swelling: None    Crepitus:  None    Atrophy:  None    Range of motion:  External Rotation: 30°       Internal Rotation: 20°, with pain       Flexion:  100°       Extension:  0°   Instability:  None  Deformities:  None  Functional testing: Positive Stinchfield    Short on the left compared to the right      Imaging/Studies      Imaging Results (Last 24 Hours)       Procedure Component Value Units Date/Time    XR Hip With or Without Pelvis 2 - 3 View Left [[id number]] Resulted: 03/20/24 1316     Updated: 03/20/24 1316    Narrative:      Left Hip Radiographs  Indication: left hip pain  Views: low AP pelvis and lateral of the left hip    Comparison: no prior studies available for review    Findings:   Bone-on-bone contact, osteophytes at the head neck junction, acetabular   osteophytes, severe arthritis.  No unusual bony features.              Assessment and Plan     Diagnoses and all orders for this visit:    1. Primary osteoarthritis of left hip (Primary)  -     XR Hip With or Without Pelvis 2 - 3 View Left  -     External Facility Surgical/Procedural Request; Future        1. Primary osteoarthritis of left hip        I reviewed my findings with the patient.  I do believe that her hip is a likely pain generator, but may not account for all of the pain that she is experiencing.  She does have a known history of some back issues.  At this point, she would like to try an intra-articular hip injection both for diagnostic and therapeutic purposes.  Ultimately she may be a candidate for hip replacement surgery if appropriate.  She would like to proceed with the injection, we will schedule that at a mutually convenient time.  Limitations of the injection was discussed today.    Return in about 4 weeks (around 4/17/2024) for After Hip Injection.      Tommie Metcalf MD  03/20/24  13:32 EDT    Dictated Utilizing Dragon Dictation

## 2024-03-22 ENCOUNTER — OUTSIDE FACILITY SERVICE (OUTPATIENT)
Dept: ORTHOPEDIC SURGERY | Facility: CLINIC | Age: 74
End: 2024-03-22
Payer: MEDICARE

## 2024-03-22 ENCOUNTER — DOCUMENTATION (OUTPATIENT)
Dept: ORTHOPEDIC SURGERY | Facility: CLINIC | Age: 74
End: 2024-03-22

## 2024-03-22 NOTE — PROGRESS NOTES
[facility]    Operative Report    [facility]  [address], [address]  [place] [address]    DATE OF PROCEDURE: 03/22/24    PREOPERATIVE DIAGNOSIS: left hip arthritis    POSTOPERATIVE DIAGNOSIS:  left hip arthritis    PROCEDURES PERFORMED:  left hip injection under fluoroscopic guidance    SURGEON: Tommie Metcalf MD    SPECIMENS: None    ESTIMATED BLOOD LOSS: None    COMPLICATIONS: None    INDICATIONS: The patient is a 73 y.o. female with left hip pain secondary to osteoarthritis that failed to improve in spite of conservative treatment.  Options have been discussed at length with the patient, and patient has reached the point where the patient desires to proceed with an intra-articular hip injection understanding the risks, benefits, and alternatives. Consent was obtained. Please see my office notes for details with regard to preprocedure counseling and rationale.     DESCRIPTION OF PROCEDURE: The patient was positively identified in the preoperative holding area and brought to the operating suite and placed in a supine position.  Timeout procedure was performed to confirm the injection site, as well as other parameters. Following the sterile prep and drape, a 20-gauge spinal needle was placed into the hip joint, and confirmed to be in an intra-articular location with radiographic dye, and subsequently infiltrated with 40 mg of Kenalog mixed with ropivacaine.    The patient tolerated the procedure well and was brought to the recovery room in good condition.  The patient noted 60% improvement after walking from the operating room to the recovery room.    PLAN:  1.  The patient will keep a written or mental diary of how well the injection works and for how long.  2.  Follow up in 4 weeks as planned in the office.    Future Appointments   Date Time Provider Department Center   4/22/2024 12:40 PM Tommie Metcalf MD [facility] ANKIT ANKIT   9/23/2024  11:00 AM Anand Pineda, DO [facility] ANKIT   1/29/2025 11:15 AM Kirk Ocasio III, MD [facility] AN[facility] ANKIT   3/24/2025  1:15 PM [facility]       Tommie Metcalf MD  03/22/24  07:55 EDT

## 2024-04-01 DIAGNOSIS — F32.A DEPRESSION, UNSPECIFIED DEPRESSION TYPE: ICD-10-CM

## 2024-04-01 RX ORDER — FLUOXETINE 20 MG/1
20 TABLET, FILM COATED ORAL DAILY
Qty: 90 TABLET | Refills: 1 | Status: SHIPPED | OUTPATIENT
Start: 2024-04-01

## 2024-04-22 ENCOUNTER — OFFICE VISIT (OUTPATIENT)
Dept: ORTHOPEDIC SURGERY | Facility: CLINIC | Age: 74
End: 2024-04-22
Payer: MEDICARE

## 2024-04-22 VITALS
BODY MASS INDEX: 18.37 KG/M2 | HEIGHT: 64 IN | DIASTOLIC BLOOD PRESSURE: 62 MMHG | SYSTOLIC BLOOD PRESSURE: 142 MMHG | WEIGHT: 107.6 LBS

## 2024-04-22 DIAGNOSIS — M16.12 PRIMARY OSTEOARTHRITIS OF LEFT HIP: Primary | ICD-10-CM

## 2024-04-22 PROCEDURE — 99212 OFFICE O/P EST SF 10 MIN: CPT | Performed by: ORTHOPAEDIC SURGERY

## 2024-04-22 PROCEDURE — 1159F MED LIST DOCD IN RCRD: CPT | Performed by: ORTHOPAEDIC SURGERY

## 2024-04-22 PROCEDURE — 1160F RVW MEDS BY RX/DR IN RCRD: CPT | Performed by: ORTHOPAEDIC SURGERY

## 2024-04-22 NOTE — PROGRESS NOTES
[facility]    Subjective     Chief Complaint   Patient presents with    Follow-up     1 month follow up -- left hip injection under fluoroscopic guidance 3/22/24 -- left hip arthritis           HPI    It has been 1 month(s) since Ms. Joseph's last visit. She returns to clinic today for follow-up of left hip arthritis. The issue has been ongoing for 1 year(s). She rates her pain a 3/10 on the pain scale. Previous/current treatments: NSAIDS and steroid injection (last injection 03/22/24). Current symptoms: giving way/buckling. The pain is worse with walking, standing, climbing stairs, and lying on affected side; resting and sitting improve the pain. Overall, she is doing better.  Injection helped out significantly.    I have reviewed the following portions of the patient's history and agree with: History of Present Illness and Review of Systems    Patient Active Problem List   Diagnosis    Paroxysmal atrial fibrillation    Tobacco use    Chronic colitis    Mixed hyperlipidemia    Anxiety    Chronic anticoagulation    Long term current use of antiarrhythmic drug    Spinal stenosis of lumbar region     Past Medical History:   Diagnosis Date    A-fib     Abnormal ECG     Allergic 51514    Arrhythmia     Arthritis     Arthritis of back     Back problem     Bronchitis     Cervical disc disorder     Chronic alcohol use     Chronic colitis     Fracture of hip     Fracture, femur     Glaucoma Years ago    Heart disease     Hip arthrosis     History of cardioversion     History of transesophageal echocardiography (YOLANDA)     Inflammatory bowel disease Years ago    Low back pain Years ago    Low back strain     Lumbosacral disc disease     Menopause     Mixed hyperlipidemia 10/11/2019    Palpitation     Benign palpitations    Scoliosis     Tibia/fibula fracture     Fall with right tibia/fibula fracture, status post tibia IM harish, September 2013.    Tobacco use     Visual impairment Years ago     Wears glasses       Past Surgical History:   Procedure Laterality Date    ABLATION OF DYSRHYTHMIC FOCUS      CARDIAC ELECTROPHYSIOLOGY PROCEDURE N/A 06/26/2020    Procedure: PVA (paroxysmal), hold Sotalol 3 days, DNS Eliquis, Rhythmia (BSC) only using direct sense;  Surgeon: Isiah Garcia DO;  Location: [facility] INVASIVE LOCATION;  Service: Cardiovascular;  Laterality: N/A;    CARDIOVERSION      COLONOSCOPY  2015    HAND SURGERY      LEG SURGERY Right     states five breaks, steal harish in R leg    TRIGGER POINT INJECTION        Family History   Problem Relation Age of Onset    Asthma Mother     No Known Problems Father     Heart disease Sister     Cancer Brother     No Known Problems Maternal Grandmother     No Known Problems Maternal Grandfather     No Known Problems Paternal Grandmother     No Known Problems Paternal Grandfather     No Known Problems Brother     Heart disease Other     Heart attack Other     Diabetes Other      Social History     Socioeconomic History    Marital status:    Tobacco Use    Smoking status: Some Days     Current packs/day: 0.25     Average packs/day: 0.3 packs/day for 67.4 years (16.9 ttl pk-yrs)     Types: Cigarettes     Start date: 1/1/1972     Passive exposure: Never    Smokeless tobacco: Never   Vaping Use    Vaping status: Never Used   Substance and Sexual Activity    Alcohol use: Yes     Alcohol/week: 2.0 standard drinks of alcohol     Types: 1 Glasses of wine, 1 Cans of beer per week     Comment: WEEKLY    Drug use: Never    Sexual activity: Not Currently     Partners: Male      Current Outpatient Medications on File Prior to Visit   Medication Sig Dispense Refill    acetaminophen (TYLENOL) 500 MG tablet Take 1 tablet by mouth Every 6 (Six) Hours As Needed for Mild Pain.      apixaban (Eliquis) 5 MG tablet tablet Take 1 tablet by mouth 2 (Two) Times a Day. 180 tablet 3    FLUoxetine (PROzac) 20 MG tablet Take 1 tablet by mouth Daily. 90 tablet 1    gabapentin  (NEURONTIN) 400 MG capsule Take  by mouth 2 (Two) Times a Day.      multivitamin with minerals (MULTIVITAMIN ADULTS 50+ PO) Take 1 tablet by mouth 2 (Two) Times a Day.      sotalol (Betapace) 80 MG tablet Take 1 tablet by mouth 2 (Two) Times a Day. 180 tablet 3    traMADol (ULTRAM) 50 MG tablet Take 1 tablet by mouth 4 (Four) Times a Day.      travoprost, REBEKA free, (TRAVATAN) 0.004 % solution ophthalmic solution travoprost 0.004 % eye drops   Instill by ophthalmic route.       No current facility-administered medications on file prior to visit.      Allergies   Allergen Reactions    Hydrocodone-Acetaminophen Nausea Only    Ibuprofen GI Bleeding     Patient does not take due to being on blood thinners    Hydrocodone GI Intolerance    Moxifloxacin Unknown (See Comments)    Penicillins Hives    Sulfa Antibiotics Nausea Only     nausea        Review of Systems   Constitutional:  Negative for activity change, appetite change, chills, diaphoresis, fatigue, fever and unexpected weight change.   HENT:  Negative for congestion, dental problem, drooling, ear discharge, ear pain, facial swelling, hearing loss, mouth sores, nosebleeds, postnasal drip, rhinorrhea, sinus pressure, sneezing, sore throat, tinnitus, trouble swallowing and voice change.    Eyes:  Negative for photophobia, pain, discharge, redness, itching and visual disturbance.   Respiratory:  Negative for apnea, cough, choking, chest tightness, shortness of breath, wheezing and stridor.    Cardiovascular:  Negative for chest pain, palpitations and leg swelling.   Gastrointestinal:  Negative for abdominal distention, abdominal pain, anal bleeding, blood in stool, constipation, diarrhea, nausea, rectal pain and vomiting.   Endocrine: Negative for cold intolerance, heat intolerance, polydipsia, polyphagia and polyuria.   Genitourinary:  Negative for decreased urine volume, difficulty urinating, dysuria, enuresis, flank pain, frequency, genital sores, hematuria and  "urgency.   Musculoskeletal:  Positive for arthralgias. Negative for back pain, gait problem, joint swelling, myalgias, neck pain and neck stiffness.   Skin:  Negative for color change, pallor, rash and wound.   Allergic/Immunologic: Negative for environmental allergies, food allergies and immunocompromised state.   Neurological:  Negative for dizziness, tremors, seizures, syncope, facial asymmetry, speech difficulty, weakness, light-headedness, numbness and headaches.   Hematological:  Negative for adenopathy. Does not bruise/bleed easily.   Psychiatric/Behavioral:  Negative for agitation, behavioral problems, confusion, decreased concentration, dysphoric mood, hallucinations, self-injury, sleep disturbance and suicidal ideas. The patient is not nervous/anxious and is not hyperactive.         Objective      Physical Exam  /62   Ht 162.6 cm (64.02\")   Wt 48.8 kg (107 lb 9.6 oz)   LMP  (LMP Unknown)   BMI 18.46 kg/m²     Body mass index is 18.46 kg/m².    General:   Mental Status:  Alert   Appearance: Cooperative, in no acute distress   Build and Nutrition: Thin female   Orientation: Alert and oriented to person, place and time   Posture: Normal   Gait: Nonantalgic    Lower Extremity:              Left Hip:                          Tenderness:    None                          Swelling:          None                          Crepitus:          None                          Atrophy:           None                          Range of motion:        External Rotation:       30°                                                              Internal Rotation:        20°, with pain                                                              Flexion:                       100°                                                              Extension:                   0°           Instability:        None  Deformities:     None  Functional testing: [facility]                          Short on the left compared " to the right    Imaging/Studies  Imaging Results (Last 24 Hours)       ** No results found for the last 24 hours. **          No new imaging today.    Assessment and Plan     Diagnoses and all orders for this visit:    1. Primary osteoarthritis of left hip (Primary)        1. Primary osteoarthritis of left hip        I reviewed my findings with the patient.  She is having good relief with the intra-articular hip injection, but is considering hip replacement surgery perhaps in October timeframe.  We did discuss hip replacement surgery in detail today (risks, benefits, alternatives, typical recovery course and results), and she is going to see me back in July to see if she would like to schedule at that point.  I will see her back sooner for any problems.        No follow-ups on file.      Tommie Metcalf MD  04/22/24  13:05 EDT    Dictated Utilizing Dragon Dictation

## 2024-04-26 ENCOUNTER — TELEPHONE (OUTPATIENT)
Dept: FAMILY MEDICINE CLINIC | Facility: CLINIC | Age: 74
End: 2024-04-26
Payer: MEDICARE

## 2024-04-26 NOTE — TELEPHONE ENCOUNTER
Caller: Mar Joseph    Relationship: Self    Best call back number: [phone]     What is the best time to reach you: ANY    Who are you requesting to speak with (clinical staff, provider,  specific staff member): CLINICAL    Do you know the name of the person who called: SELF    What was the call regarding: PATIENT WAS SEEN AT URGENT CARE FOR A SINUS ISSUE. THEY TOOK AN X-RAY AND TOLD HER IT LOOKED LIKE SHE MAY BE IN THE EARLY STAGES OF COPD. SHE WANTED TO KNOW IF DR. MOSES WANTED TO SEE HER FOR THIS, OR JUST WANTED TO REFER HER TO A PULMONOLOGIST. PLEASE CALL TO DISCUSS.    Is it okay if the provider responds through MyChart: NO

## 2024-04-26 NOTE — TELEPHONE ENCOUNTER
Yes, definitely would need an appointment for workup of this new and not previously discussed issue.

## 2024-05-01 ENCOUNTER — HOSPITAL ENCOUNTER (OUTPATIENT)
Dept: RADIOLOGY | Facility: CLINIC | Age: 74
Discharge: HOME | End: 2024-05-01
Payer: MEDICARE

## 2024-05-01 ENCOUNTER — OFFICE VISIT (OUTPATIENT)
Dept: FAMILY MEDICINE CLINIC | Facility: CLINIC | Age: 74
End: 2024-05-01
Payer: MEDICARE

## 2024-05-01 VITALS
BODY MASS INDEX: 18.92 KG/M2 | WEIGHT: 110.8 LBS | SYSTOLIC BLOOD PRESSURE: 126 MMHG | HEIGHT: 64 IN | OXYGEN SATURATION: 96 % | HEART RATE: 70 BPM | DIASTOLIC BLOOD PRESSURE: 74 MMHG

## 2024-05-01 DIAGNOSIS — I48.0 PAROXYSMAL ATRIAL FIBRILLATION: ICD-10-CM

## 2024-05-01 DIAGNOSIS — R09.89 HYPERINFLATION OF LUNGS: ICD-10-CM

## 2024-05-01 DIAGNOSIS — Z79.01 CHRONIC ANTICOAGULATION: ICD-10-CM

## 2024-05-01 DIAGNOSIS — R05.1 ACUTE COUGH: Primary | ICD-10-CM

## 2024-05-01 PROCEDURE — 99214 OFFICE O/P EST MOD 30 MIN: CPT | Performed by: FAMILY MEDICINE

## 2024-05-01 PROCEDURE — 93000 ELECTROCARDIOGRAM COMPLETE: CPT | Performed by: FAMILY MEDICINE

## 2024-05-01 RX ORDER — ALBUTEROL SULFATE 90 UG/1
2 AEROSOL, METERED RESPIRATORY (INHALATION)
COMMUNITY
Start: 2024-04-24

## 2024-05-01 RX ORDER — FLUTICASONE PROPIONATE 50 MCG
1 SPRAY, SUSPENSION (ML) NASAL DAILY
Qty: 16 G | Refills: 11 | Status: SHIPPED | OUTPATIENT
Start: 2024-05-01

## 2024-05-01 RX ORDER — FEXOFENADINE HCL 180 MG/1
180 TABLET ORAL DAILY
Qty: 90 TABLET | Refills: 3 | Status: SHIPPED | OUTPATIENT
Start: 2024-05-01

## 2024-05-01 NOTE — PATIENT INSTRUCTIONS
Stay off smoking  You will be called to schedule lung function testing  Have chest x-ray completed once you feel better

## 2024-05-01 NOTE — PROGRESS NOTES
Established Patient Office Visit      Patient Name: Mar Joseph  : 1950   MRN: [id number]   Care Team: Patient Care Team:  Anand Pineda DO as PCP - General (Family Medicine)  Isiah Garcia DO as Consulting Physician (Cardiology)  Kirk Ocasio III, MD as Cardiologist (Cardiology)  Artur Figueredo PA as Physician Assistant (Cardiology)    Chief Complaint:    Chief Complaint   Patient presents with    Results     Patient wants to discuss  X-RAY       History of Present Illness: Mar Joseph is a 73 y.o. female who is here today for chief complaint.    HPI    Was at [facility] , CXR ?COPD, was placed on doxy and a steroid pack plus albuterol.  Cough oingoing for 2 weeks. SOA, orthopnea. Starts coughing usually around dinner time. No pedal edema, no JVD on exam.    H/O paroxysmal a fib, s/p ablation 2020.    This patient is accompanied by their self who contributes to the history of their care.    The following portions of the patient's history were reviewed and updated as appropriate: allergies, current medications, past family history, past medical history, past social history, past surgical history and problem list.    Subjective      Review of Systems:   Review of Systems - See HPI    Past Medical History:   Past Medical History:   Diagnosis Date    A-fib     Abnormal ECG     Allergic [id number]    Arrhythmia     Arthritis     Arthritis of back     Back problem     Bronchitis     Cervical disc disorder     Chronic alcohol use     Chronic colitis     Fracture of hip     Fracture, femur     Glaucoma Years ago    Heart disease     Hip arthrosis     History of cardioversion     History of transesophageal echocardiography (YOLANDA)     Inflammatory bowel disease Years ago    Low back pain Years ago    Low back strain     Lumbosacral disc disease     Menopause     Mixed hyperlipidemia 10/11/2019    Palpitation     Benign palpitations    Scoliosis     Tibia/fibula fracture     Fall with  right tibia/fibula fracture, status post tibia IM harish, September 2013.    Tobacco use     Visual impairment Years ago    Wears glasses        Past Surgical History:   Past Surgical History:   Procedure Laterality Date    ABLATION OF DYSRHYTHMIC FOCUS      CARDIAC ELECTROPHYSIOLOGY PROCEDURE N/A 06/26/2020    Procedure: PVA (paroxysmal), hold Sotalol 3 days, DNS Eliquis, Rhythmia (BSC) only using direct sense;  Surgeon: Isiah Garcia DO;  Location: [facility] INVASIVE LOCATION;  Service: Cardiovascular;  Laterality: N/A;    CARDIOVERSION      COLONOSCOPY  2015    HAND SURGERY      LEG SURGERY Right     states five breaks, steal harish in R leg    TRIGGER POINT INJECTION         Family History:   Family History   Problem Relation Age of Onset    Asthma Mother     No Known Problems Father     Heart disease Sister     Cancer Brother     No Known Problems Maternal Grandmother     No Known Problems Maternal Grandfather     No Known Problems Paternal Grandmother     No Known Problems Paternal Grandfather     No Known Problems Brother     Heart disease Other     Heart attack Other     Diabetes Other        Social History:   Social History     Socioeconomic History    Marital status:    Tobacco Use    Smoking status: Some Days     Current packs/day: 0.25     Average packs/day: 0.3 packs/day for 67.4 years (16.9 ttl pk-yrs)     Types: Cigarettes     Start date: 1/1/1972     Passive exposure: Never    Smokeless tobacco: Never   Vaping Use    Vaping status: Never Used   Substance and Sexual Activity    Alcohol use: Yes     Alcohol/week: 2.0 standard drinks of alcohol     Types: 1 Glasses of wine, 1 Cans of beer per week     Comment: WEEKLY    Drug use: Never    Sexual activity: Not Currently     Partners: Male       Tobacco History:   Social History     Tobacco Use   Smoking Status Some Days    Current packs/day: 0.25    Average packs/day: 0.3 packs/day for 67.4 years (16.9 ttl pk-yrs)    Types: Cigarettes    Start date:  "1/1/1972    Passive exposure: Never   Smokeless Tobacco Never       Medications:     Current Outpatient Medications:     acetaminophen (TYLENOL) 500 MG tablet, Take 1 tablet by mouth Every 6 (Six) Hours As Needed for Mild Pain., Disp: , Rfl:     albuterol sulfate  (90 Base) MCG/ACT inhaler, 2 puffs., Disp: , Rfl:     apixaban (Eliquis) 5 MG tablet tablet, Take 1 tablet by mouth 2 (Two) Times a Day., Disp: 180 tablet, Rfl: 3    FLUoxetine (PROzac) 20 MG tablet, Take 1 tablet by mouth Daily., Disp: 90 tablet, Rfl: 1    gabapentin (NEURONTIN) 400 MG capsule, Take  by mouth 2 (Two) Times a Day., Disp: , Rfl:     multivitamin with minerals (MULTIVITAMIN ADULTS 50+ PO), Take 1 tablet by mouth 2 (Two) Times a Day., Disp: , Rfl:     sotalol (Betapace) 80 MG tablet, Take 1 tablet by mouth 2 (Two) Times a Day., Disp: 180 tablet, Rfl: 3    traMADol (ULTRAM) 50 MG tablet, Take 1 tablet by mouth 4 (Four) Times a Day., Disp: , Rfl:     travoprost, BAK free, (TRAVATAN) 0.004 % solution ophthalmic solution, travoprost 0.004 % eye drops  Instill by ophthalmic route., Disp: , Rfl:     fexofenadine (Allegra Allergy) 180 MG tablet, Take 1 tablet by mouth Daily., Disp: 90 tablet, Rfl: 3    fluticasone (FLONASE) 50 MCG/ACT nasal spray, 1 spray into the nostril(s) as directed by provider Daily., Disp: 16 g, Rfl: 11    Allergies:   Allergies   Allergen Reactions    Hydrocodone-Acetaminophen Nausea Only    Ibuprofen GI Bleeding     Patient does not take due to being on blood thinners    Hydrocodone GI Intolerance    Moxifloxacin Unknown (See Comments)    Penicillins Hives    Sulfa Antibiotics Nausea Only     nausea       Objective   Objective     Physical Exam:  Vital Signs:   Vitals:    05/01/24 1037   BP: 126/74   Pulse: 70   SpO2: 96%   Weight: 50.3 kg (110 lb 12.8 oz)   Height: 162.6 cm (64.02\")   PainSc: 0-No pain     Body mass index is 19.01 kg/m².     Physical Exam  Nursing note reviewed  Const: NAD, A&Ox4, Pleasant, " Cooperative  Eyes: EOMI, no conjunctivitis  ENT: No nasal discharge present, neck supple  Cardiac: Regular rate and rhythm, no cyanosis  Resp: Respiratory rate within normal limits, no increased work of breathing, no audible wheezing or retractions noted  GI: No distention or ascites  MSK: Motor and sensation grossly intact in bilateral upper extremities  Neurologic: CN II-XII grossly intact  Psych: Appropriate mood and behavior.  Skin: Warm, dry  Procedures/Radiology       ECG 12 Lead    Date/Time: 5/1/2024 11:44 AM  Performed by: Anand Pineda DO    Authorized by: Anand Pineda DO  Comparison: compared with previous ECG from 3/16/2024  Rhythm: sinus rhythm  Rate: normal  QRS axis: normal    Clinical impression: normal ECG        No radiology results for the last 7 days     Assessment & Plan   Assessment / Plan      Assessment/Plan:   Problems Addressed This Visit  Diagnoses and all orders for this visit:    1. Acute cough (Primary)  -     fexofenadine (Allegra Allergy) 180 MG tablet; Take 1 tablet by mouth Daily.  Dispense: 90 tablet; Refill: 3  -     fluticasone (FLONASE) 50 MCG/ACT nasal spray; 1 spray into the nostril(s) as directed by provider Daily.  Dispense: 16 g; Refill: 11  -     ECG 12 Lead    2. Hyperinflation of lungs  -     Complete PFT - Pre & Post Bronchodilator; Future  -     XR Chest PA & Lateral; Future    3. Paroxysmal atrial fibrillation    4. Chronic anticoagulation      Problem List Items Addressed This Visit          Cardiac and Vasculature    Paroxysmal atrial fibrillation    Overview     Diagnosed 2013  Recurrence with YOLANDA/ECV 10/16/2019 / LVEF 60% / LA moderately dilated   EPs and PVI 6/26/2020 Dr. Garcia  Sotalol restarted secondary to recurrent palpitations            Coag and Thromboembolic    Chronic anticoagulation     Other Visit Diagnoses       Acute cough    -  Primary    Relevant Medications    fexofenadine (Allegra Allergy) 180 MG tablet    fluticasone (FLONASE) 50  MCG/ACT nasal spray    Other Relevant Orders    ECG 12 Lead    Hyperinflation of lungs        Relevant Orders    Complete PFT - Pre & Post Bronchodilator    XR Chest PA & Lateral          With her history of A-fib I did want a check an EKG today to make sure she is not somehow converted back, EKG is unremarkable, sinus rhythm with very minimal noncontiguous ST depression (by computer-read).    She may have an element of underlying COPD, however given her nonresponse to albuterol, steroids, and doxycycline I do not believe this is an exacerbation.  She had a chest x-ray in 2016 that also showed similar hyperinflation of the lungs as to what was seen at the urgent care, and she does have a long smoking history.  Recommended a repeat chest x-ray once her acute cough resolves, and pulmonary function testing in the next few weeks once she is feeling better.    In terms of her cough given her nonresponse, may be having more allergy symptoms.  EKG today is unremarkable, no signs of fluid overload.  She is on Eliquis chronically, so blood clot would seem unlikely.  Will send in allergy medication as an empiric trial, particularly with her nasal congestion and postnasal drainage/mucus, she will follow-up with me in 1 to 2 weeks with her status.    Patient Instructions   Stay off smoking  You will be called to schedule lung function testing  Have chest x-ray completed once you feel better    Follow Up:   Return for follow up after testing/imaging.    I spent 35 minutes caring for Mar on this date of service. This time includes time spent by me in the following activities:reviewing tests, obtaining and/or reviewing a separately obtained history, performing a medically appropriate examination and/or evaluation , counseling and educating the patient/family/caregiver, ordering medications, tests, or procedures, documenting information in the medical record, and independently interpreting results and communicating that  information with the patient/family/caregiver    DO LASHON Gilmore RD  [facility]  [id number]  [facility] [address]  Fax [phone]  Phone [phone]

## 2024-05-10 ENCOUNTER — HOSPITAL ENCOUNTER (OUTPATIENT)
Dept: PULMONOLOGY | Facility: HOSPITAL | Age: 74
Discharge: HOME | End: 2024-05-10
Payer: MEDICARE

## 2024-05-10 DIAGNOSIS — R09.89 HYPERINFLATION OF LUNGS: ICD-10-CM

## 2024-05-10 PROCEDURE — 94726 PLETHYSMOGRAPHY LUNG VOLUMES: CPT

## 2024-05-10 PROCEDURE — 94640 AIRWAY INHALATION TREATMENT: CPT

## 2024-05-10 PROCEDURE — 94060 EVALUATION OF WHEEZING: CPT

## 2024-05-10 PROCEDURE — 94729 DIFFUSING CAPACITY: CPT

## 2024-05-10 RX ORDER — ALBUTEROL SULFATE 2.5 MG/3ML
2.5 SOLUTION RESPIRATORY (INHALATION) ONCE
Status: DISCONTINUED | OUTPATIENT
Start: 2024-05-10 | End: 2024-05-11 | Stop reason: HOSPADM

## 2024-05-10 RX ORDER — ALBUTEROL SULFATE 2.5 MG/3ML
2.5 SOLUTION RESPIRATORY (INHALATION)
Status: DISCONTINUED | OUTPATIENT
Start: 2024-05-10 | End: 2024-05-10

## 2024-05-10 RX ADMIN — ALBUTEROL SULFATE 2.5 MG: 2.5 SOLUTION RESPIRATORY (INHALATION) at 15:15

## 2024-05-15 ENCOUNTER — OFFICE VISIT (OUTPATIENT)
Dept: FAMILY MEDICINE CLINIC | Facility: CLINIC | Age: 74
End: 2024-05-15
Payer: MEDICARE

## 2024-05-15 ENCOUNTER — LAB (OUTPATIENT)
Dept: LAB | Facility: HOSPITAL | Age: 74
End: 2024-05-15
Payer: MEDICARE

## 2024-05-15 VITALS
SYSTOLIC BLOOD PRESSURE: 112 MMHG | BODY MASS INDEX: 18.1 KG/M2 | OXYGEN SATURATION: 96 % | HEIGHT: 64 IN | HEART RATE: 65 BPM | TEMPERATURE: 98 F | WEIGHT: 106 LBS | DIASTOLIC BLOOD PRESSURE: 70 MMHG

## 2024-05-15 DIAGNOSIS — R19.7 DIARRHEA, UNSPECIFIED TYPE: ICD-10-CM

## 2024-05-15 DIAGNOSIS — E53.8 BIOTIN-(PROPIONYL-COA-CARBOXYLASE) LIGASE DEFICIENCY: ICD-10-CM

## 2024-05-15 DIAGNOSIS — R19.7 DIARRHEA, UNSPECIFIED TYPE: Primary | ICD-10-CM

## 2024-05-15 DIAGNOSIS — E55.9 AVITAMINOSIS D: ICD-10-CM

## 2024-05-15 DIAGNOSIS — Z13.0 SCREENING FOR IRON DEFICIENCY ANEMIA: ICD-10-CM

## 2024-05-15 DIAGNOSIS — E78.2 MIXED HYPERLIPIDEMIA: ICD-10-CM

## 2024-05-15 DIAGNOSIS — Z00.00 ROUTINE GENERAL MEDICAL EXAMINATION AT A HEALTH CARE FACILITY: Primary | ICD-10-CM

## 2024-05-15 DIAGNOSIS — Z13.29 SCREENING FOR THYROID DISORDER: ICD-10-CM

## 2024-05-15 DIAGNOSIS — Z11.59 SCREENING EXAMINATION FOR POLIOMYELITIS: ICD-10-CM

## 2024-05-15 DIAGNOSIS — F32.A DEPRESSION, UNSPECIFIED DEPRESSION TYPE: ICD-10-CM

## 2024-05-15 LAB
BASOPHILS # BLD AUTO: 0.03 10*3/MM3 (ref 0–0.2)
BASOPHILS NFR BLD AUTO: 0.3 % (ref 0–1.5)
DEPRECATED RDW RBC AUTO: 40.5 FL (ref 37–54)
EOSINOPHIL # BLD AUTO: 0.11 10*3/MM3 (ref 0–0.4)
EOSINOPHIL NFR BLD AUTO: 1.2 % (ref 0.3–6.2)
ERYTHROCYTE [DISTWIDTH] IN BLOOD BY AUTOMATED COUNT: 11.8 % (ref 12.3–15.4)
HCT VFR BLD AUTO: 40.8 % (ref 34–46.6)
HGB BLD-MCNC: 13.9 G/DL (ref 12–15.9)
IMM GRANULOCYTES # BLD AUTO: 0.04 10*3/MM3 (ref 0–0.05)
IMM GRANULOCYTES NFR BLD AUTO: 0.4 % (ref 0–0.5)
LYMPHOCYTES # BLD AUTO: 1.42 10*3/MM3 (ref 0.7–3.1)
LYMPHOCYTES NFR BLD AUTO: 15.6 % (ref 19.6–45.3)
MCH RBC QN AUTO: 32.6 PG (ref 26.6–33)
MCHC RBC AUTO-ENTMCNC: 34.1 G/DL (ref 31.5–35.7)
MCV RBC AUTO: 95.8 FL (ref 79–97)
MONOCYTES # BLD AUTO: 0.84 10*3/MM3 (ref 0.1–0.9)
MONOCYTES NFR BLD AUTO: 9.3 % (ref 5–12)
NEUTROPHILS NFR BLD AUTO: 6.64 10*3/MM3 (ref 1.7–7)
NEUTROPHILS NFR BLD AUTO: 73.2 % (ref 42.7–76)
NRBC BLD AUTO-RTO: 0 /100 WBC (ref 0–0.2)
PLATELET # BLD AUTO: 377 10*3/MM3 (ref 140–450)
PMV BLD AUTO: 11.5 FL (ref 6–12)
RBC # BLD AUTO: 4.26 10*6/MM3 (ref 3.77–5.28)
WBC NRBC COR # BLD AUTO: 9.08 10*3/MM3 (ref 3.4–10.8)

## 2024-05-15 PROCEDURE — 82306 VITAMIN D 25 HYDROXY: CPT

## 2024-05-15 PROCEDURE — 99214 OFFICE O/P EST MOD 30 MIN: CPT

## 2024-05-15 PROCEDURE — 36415 COLL VENOUS BLD VENIPUNCTURE: CPT

## 2024-05-15 PROCEDURE — 80053 COMPREHEN METABOLIC PANEL: CPT

## 2024-05-15 PROCEDURE — 1126F AMNT PAIN NOTED NONE PRSNT: CPT

## 2024-05-15 PROCEDURE — 85025 COMPLETE CBC W/AUTO DIFF WBC: CPT

## 2024-05-15 PROCEDURE — 1159F MED LIST DOCD IN RCRD: CPT

## 2024-05-15 PROCEDURE — 83735 ASSAY OF MAGNESIUM: CPT

## 2024-05-15 PROCEDURE — 1160F RVW MEDS BY RX/DR IN RCRD: CPT

## 2024-05-15 RX ORDER — FLUOXETINE HYDROCHLORIDE 40 MG/1
40 CAPSULE ORAL DAILY
Qty: 30 CAPSULE | Refills: 5 | Status: SHIPPED | OUTPATIENT
Start: 2024-05-15

## 2024-05-15 NOTE — PROGRESS NOTES
Office Note     Name: Mar Joseph    : 1950     MRN: [id number]     Chief Complaint  Nausea (X1 month ), Headache, and Cough (X1 month )    Subjective     History of Present Illness:  Mar Joseph is a 73 y.o. female who presents today for 1 week history of nausea and diarrhea.  She has a past medical history of A-fib, allergies, arthritis, chronic colitis.  Patient states about 1 month ago she had a upper respiratory infection that would not go away.  She had some imaging studies done that showed possible hyperinflation of her lungs and is undergoing PFTs currently with her PCP.  Patient states overall her cough has improved.  At that time she had done 2 rounds of doxycycline back-to-back.  She completed her last dose about 2 weeks ago.  About 1 week ago she started to have diarrhea, nausea and some vomiting.  Patient states that she has not had a normal bowel movement in over a week.  She states she has vomited a couple of times.  She has been able to keep down food and water.  She states she has been feeling nauseous and does not really have an appetite.  She denies any fevers or chills.  She did have a colonoscopy back in August of last year and had 1 polyp removed that was normal.  She denies any blood in her stool or unexpected weight loss.  Patient states she does have chronic colitis and GI issues.  Patient denies mucus in her stool, but states that it does have a strange smell to it.  She goes 2-3 times per day.  She denies abdominal pain, but will just get the urge to go.  She denies palpitations, dizziness, lightheadedness, chest pain, shortness of breath.      Patient states she also quit smoking completely about 3 weeks ago.  She states since then her depression has worsened.  She is currently on 20 mg of Prozac daily.  She states that this was working well for her at 1 point, but after she quit smoking and has not been as beneficial.  She would like to discuss possibly increasing  her dose.    Review of Systems:   Review of Systems   Constitutional:  Negative for chills and fever.   HENT:  Negative for congestion.    Respiratory:  Negative for cough and shortness of breath.    Gastrointestinal:  Positive for diarrhea, nausea and vomiting. Negative for abdominal pain and blood in stool.   Neurological:  Negative for dizziness, light-headedness and confusion.       Past Medical History:   Past Medical History:   Diagnosis Date    A-fib     Abnormal ECG     Allergic [id number]    Arrhythmia     Arthritis     Arthritis of back     Back problem     Bronchitis     Cervical disc disorder     Chronic alcohol use     Chronic colitis     Fracture of hip     Fracture, femur     Glaucoma Years ago    Heart disease     Hip arthrosis     History of cardioversion     History of transesophageal echocardiography (YOALNDA)     Inflammatory bowel disease Years ago    Low back pain Years ago    Low back strain     Lumbosacral disc disease     Menopause     Mixed hyperlipidemia 10/11/2019    Palpitation     Benign palpitations    Scoliosis     Tibia/fibula fracture     Fall with right tibia/fibula fracture, status post tibia IM harish, September 2013.    Tobacco use     Visual impairment Years ago    Wears glasses        Past Surgical History:   Past Surgical History:   Procedure Laterality Date    ABLATION OF DYSRHYTHMIC FOCUS      CARDIAC ELECTROPHYSIOLOGY PROCEDURE N/A 06/26/2020    Procedure: PVA (paroxysmal), hold Sotalol 3 days, DNS Eliquis, Rhythmia (BSC) only using direct sense;  Surgeon: Isiah Garcia DO;  Location: [facility] INVASIVE LOCATION;  Service: Cardiovascular;  Laterality: N/A;    CARDIOVERSION      COLONOSCOPY  2015    HAND SURGERY      LEG SURGERY Right     states five breaks, steal harish in R leg    TRIGGER POINT INJECTION         Family History:   Family History   Problem Relation Age of Onset    Asthma Mother     No Known Problems Father     Heart disease Sister     Cancer Brother     No Known  Problems Maternal Grandmother     No Known Problems Maternal Grandfather     No Known Problems Paternal Grandmother     No Known Problems Paternal Grandfather     No Known Problems Brother     Heart disease Other     Heart attack Other     Diabetes Other        Social History:   Social History     Socioeconomic History    Marital status:    Tobacco Use    Smoking status: Some Days     Current packs/day: 0.25     Average packs/day: 0.3 packs/day for 67.5 years (16.9 ttl pk-yrs)     Types: Cigarettes     Start date: 1/1/1972     Passive exposure: Never    Smokeless tobacco: Never   Vaping Use    Vaping status: Never Used   Substance and Sexual Activity    Alcohol use: Yes     Alcohol/week: 2.0 standard drinks of alcohol     Types: 1 Glasses of wine, 1 Cans of beer per week     Comment: WEEKLY    Drug use: Never    Sexual activity: Not Currently     Partners: Male       Immunizations:   Immunization History   Administered Date(s) Administered    COVID-19 (PFIZER) Purple Cap Monovalent 03/04/2021, 03/27/2021, 08/24/2021    COVID-19 F23 (PFIZER) 12YRS+ (COMIRNATY) 10/10/2023    Covid-19 (Pfizer) Gray Cap Monovalent 04/30/2022    FLUAD TRI 65YR+ 01/28/2013, 10/14/2019    Flu Vaccine Intradermal Quad 18-64YR 01/28/2013    Fluzone High Dose =>65 Years ([employer] ONLY) 10/14/2019, 09/15/2020    Fluzone High-Dose 65+yrs 09/15/2020, 11/02/2022, 10/10/2023    Influenza Seasonal Injectable 01/28/2013    Pneumococcal Polysaccharide (PPSV23) 01/31/2014, 07/21/2020    Tdap 02/02/2015        Medications:     Current Outpatient Medications:     acetaminophen (TYLENOL) 500 MG tablet, Take 1 tablet by mouth Every 6 (Six) Hours As Needed for Mild Pain., Disp: , Rfl:     albuterol sulfate  (90 Base) MCG/ACT inhaler, 2 puffs., Disp: , Rfl:     apixaban (Eliquis) 5 MG tablet tablet, Take 1 tablet by mouth 2 (Two) Times a Day., Disp: 180 tablet, Rfl: 3    fexofenadine (Allegra Allergy) 180 MG tablet, Take 1 tablet by mouth  "Daily., Disp: 90 tablet, Rfl: 3    fluticasone (FLONASE) 50 MCG/ACT nasal spray, 1 spray into the nostril(s) as directed by provider Daily., Disp: 16 g, Rfl: 11    gabapentin (NEURONTIN) 400 MG capsule, Take  by mouth 2 (Two) Times a Day., Disp: , Rfl:     multivitamin with minerals (MULTIVITAMIN ADULTS 50+ PO), Take 1 tablet by mouth 2 (Two) Times a Day., Disp: , Rfl:     sotalol (Betapace) 80 MG tablet, Take 1 tablet by mouth 2 (Two) Times a Day., Disp: 180 tablet, Rfl: 3    traMADol (ULTRAM) 50 MG tablet, Take 1 tablet by mouth 4 (Four) Times a Day., Disp: , Rfl:     travoprost, REBEKA free, (TRAVATAN) 0.004 % solution ophthalmic solution, travoprost 0.004 % eye drops  Instill by ophthalmic route., Disp: , Rfl:     FLUoxetine (PROzac) 40 MG capsule, Take 1 capsule by mouth Daily., Disp: 30 capsule, Rfl: 5    Allergies:   Allergies   Allergen Reactions    Hydrocodone-Acetaminophen Nausea Only    Ibuprofen GI Bleeding     Patient does not take due to being on blood thinners    Hydrocodone GI Intolerance    Moxifloxacin Unknown (See Comments)    Penicillins Hives    Sulfa Antibiotics Nausea Only     nausea       Objective     Vital Signs  /70   Pulse 65   Temp 98 °F (36.7 °C)   Ht 162.6 cm (64.02\")   Wt 48.1 kg (106 lb)   SpO2 96%   BMI 18.18 kg/m²   Estimated body mass index is 18.18 kg/m² as calculated from the following:    Height as of this encounter: 162.6 cm (64.02\").    Weight as of this encounter: 48.1 kg (106 lb).           Physical Exam  Vitals reviewed.   Constitutional:       General: She is not in acute distress.     Appearance: Normal appearance. She is ill-appearing.   HENT:      Head: Normocephalic and atraumatic.      Nose: Nose normal.   Eyes:      Pupils: Pupils are equal, round, and reactive to light.   Cardiovascular:      Rate and Rhythm: Normal rate and regular rhythm.      Pulses: Normal pulses.      Heart sounds: Normal heart sounds.   Pulmonary:      Effort: Pulmonary effort is " normal.      Breath sounds: Normal breath sounds.   Abdominal:      General: Abdomen is flat. Bowel sounds are normal. There is no distension.      Palpations: Abdomen is soft.      Tenderness: There is no abdominal tenderness. There is no guarding.   Skin:     General: Skin is warm.   Neurological:      General: No focal deficit present.      Mental Status: She is alert and oriented to person, place, and time.   Psychiatric:         Mood and Affect: Mood normal.                Assessment and Plan     Assessment/Plan:  Diagnoses and all orders for this visit:    1. Diarrhea, unspecified type (Primary)  -     CBC Auto Differential; Future  -     Comprehensive Metabolic Panel; Future  -     Clostridioides difficile Toxin, PCR - Stool, Per Rectum; Future  -     Magnesium; Future    2. Depression, unspecified depression type  -     FLUoxetine (PROzac) 40 MG capsule; Take 1 capsule by mouth Daily.  Dispense: 30 capsule; Refill: 5      With patient's recent prolonged antibiotic use we will  rule out C. difficile with her history and reports of symptoms.  Not tender to palpation and afebrile today.  Will also do some basic blood work as well.  I advised patient that if she begins to have abdominal pain, fevers, chest pain, palpitations, chills or any changes in symptoms at all to either return to clinic or seek care in the emergency room.  I told her to make sure that she stays well-hydrated with water.  Can even drink Pedialyte to replace electrolytes.  If you become unable to eat at all this to be another reason to seek care again.  If blood work comes back normal and negative for C. difficile will refer to GI for further evaluation.    Will also increase patient's dose of Prozac today to 40 mg as the 20 mg has not become as effective recently.  Patient to monitor symptoms and notify us of any new symptoms or changes.  She verbalized understanding and agreed to this plan.    Follow Up  No follow-ups on file.    Blossom  PEDRO Taylor PA-C   [facility]

## 2024-05-16 ENCOUNTER — APPOINTMENT (OUTPATIENT)
Dept: CT IMAGING | Facility: HOSPITAL | Age: 74
End: 2024-05-16
Payer: MEDICARE

## 2024-05-16 ENCOUNTER — TELEPHONE (OUTPATIENT)
Dept: FAMILY MEDICINE CLINIC | Facility: CLINIC | Age: 74
End: 2024-05-16
Payer: MEDICARE

## 2024-05-16 ENCOUNTER — LAB (OUTPATIENT)
Dept: LAB | Facility: HOSPITAL | Age: 74
End: 2024-05-16
Payer: MEDICARE

## 2024-05-16 ENCOUNTER — HOSPITAL ENCOUNTER (EMERGENCY)
Facility: HOSPITAL | Age: 74
Discharge: HOME | End: 2024-05-16
Attending: EMERGENCY MEDICINE
Payer: MEDICARE

## 2024-05-16 VITALS
BODY MASS INDEX: 18.1 KG/M2 | HEART RATE: 65 BPM | RESPIRATION RATE: 16 BRPM | TEMPERATURE: 98.1 F | DIASTOLIC BLOOD PRESSURE: 88 MMHG | WEIGHT: 106 LBS | HEIGHT: 64 IN | OXYGEN SATURATION: 97 % | SYSTOLIC BLOOD PRESSURE: 142 MMHG

## 2024-05-16 DIAGNOSIS — R19.7 DIARRHEA, UNSPECIFIED TYPE: ICD-10-CM

## 2024-05-16 DIAGNOSIS — E86.0 DEHYDRATION: Primary | ICD-10-CM

## 2024-05-16 DIAGNOSIS — E87.6 HYPOKALEMIA: ICD-10-CM

## 2024-05-16 DIAGNOSIS — R11.0 NAUSEA: ICD-10-CM

## 2024-05-16 DIAGNOSIS — K44.9 HIATAL HERNIA: ICD-10-CM

## 2024-05-16 LAB
25(OH)D3 SERPL-MCNC: 32.4 NG/ML (ref 30–100)
ALBUMIN SERPL-MCNC: 4.2 G/DL (ref 3.5–5.2)
ALBUMIN SERPL-MCNC: 4.2 G/DL (ref 3.5–5.2)
ALBUMIN/GLOB SERPL: 1.3 G/DL
ALBUMIN/GLOB SERPL: 1.4 G/DL
ALP SERPL-CCNC: 79 U/L (ref 39–117)
ALP SERPL-CCNC: 83 U/L (ref 39–117)
ALT SERPL W P-5'-P-CCNC: 22 U/L (ref 1–33)
ALT SERPL W P-5'-P-CCNC: 23 U/L (ref 1–33)
ANION GAP SERPL CALCULATED.3IONS-SCNC: 13 MMOL/L (ref 5–15)
ANION GAP SERPL CALCULATED.3IONS-SCNC: 17.1 MMOL/L (ref 5–15)
AST SERPL-CCNC: 17 U/L (ref 1–32)
AST SERPL-CCNC: 21 U/L (ref 1–32)
BACTERIA UR QL AUTO: NORMAL /HPF
BASOPHILS # BLD AUTO: 0.05 10*3/MM3 (ref 0–0.2)
BASOPHILS NFR BLD AUTO: 0.4 % (ref 0–1.5)
BILIRUB SERPL-MCNC: 0.4 MG/DL (ref 0–1.2)
BILIRUB SERPL-MCNC: 0.5 MG/DL (ref 0–1.2)
BILIRUB UR QL STRIP: ABNORMAL
BUN SERPL-MCNC: 10 MG/DL (ref 8–23)
BUN SERPL-MCNC: 11 MG/DL (ref 8–23)
BUN/CREAT SERPL: 17.9 (ref 7–25)
BUN/CREAT SERPL: 20.4 (ref 7–25)
C DIFF TOX GENS STL QL NAA+PROBE: NOT DETECTED
CALCIUM SPEC-SCNC: 10.1 MG/DL (ref 8.6–10.5)
CALCIUM SPEC-SCNC: 10.4 MG/DL (ref 8.6–10.5)
CHLORIDE SERPL-SCNC: 100 MMOL/L (ref 98–107)
CHLORIDE SERPL-SCNC: 96 MMOL/L (ref 98–107)
CLARITY UR: CLEAR
CO2 SERPL-SCNC: 21.9 MMOL/L (ref 22–29)
CO2 SERPL-SCNC: 25 MMOL/L (ref 22–29)
COLOR UR: YELLOW
CREAT SERPL-MCNC: 0.54 MG/DL (ref 0.57–1)
CREAT SERPL-MCNC: 0.56 MG/DL (ref 0.57–1)
D-LACTATE SERPL-SCNC: 1.6 MMOL/L (ref 0.5–2)
DEPRECATED RDW RBC AUTO: 41.1 FL (ref 37–54)
EGFRCR SERPLBLD CKD-EPI 2021: 97.1 ML/MIN/1.73
EGFRCR SERPLBLD CKD-EPI 2021: 98 ML/MIN/1.73
EOSINOPHIL # BLD AUTO: 0.13 10*3/MM3 (ref 0–0.4)
EOSINOPHIL NFR BLD AUTO: 0.9 % (ref 0.3–6.2)
ERYTHROCYTE [DISTWIDTH] IN BLOOD BY AUTOMATED COUNT: 11.9 % (ref 12.3–15.4)
GLOBULIN UR ELPH-MCNC: 3.1 GM/DL
GLOBULIN UR ELPH-MCNC: 3.2 GM/DL
GLUCOSE SERPL-MCNC: 111 MG/DL (ref 65–99)
GLUCOSE SERPL-MCNC: 121 MG/DL (ref 65–99)
GLUCOSE UR STRIP-MCNC: NEGATIVE MG/DL
HCT VFR BLD AUTO: 40.5 % (ref 34–46.6)
HGB BLD-MCNC: 14 G/DL (ref 12–15.9)
HGB UR QL STRIP.AUTO: NEGATIVE
HOLD SPECIMEN: NORMAL
HYALINE CASTS UR QL AUTO: NORMAL /LPF
IMM GRANULOCYTES # BLD AUTO: 0.07 10*3/MM3 (ref 0–0.05)
IMM GRANULOCYTES NFR BLD AUTO: 0.5 % (ref 0–0.5)
KETONES UR QL STRIP: NEGATIVE
LEUKOCYTE ESTERASE UR QL STRIP.AUTO: NEGATIVE
LIPASE SERPL-CCNC: 27 U/L (ref 13–60)
LYMPHOCYTES # BLD AUTO: 1.22 10*3/MM3 (ref 0.7–3.1)
LYMPHOCYTES NFR BLD AUTO: 8.9 % (ref 19.6–45.3)
MAGNESIUM SERPL-MCNC: 2.1 MG/DL (ref 1.6–2.4)
MCH RBC QN AUTO: 32.3 PG (ref 26.6–33)
MCHC RBC AUTO-ENTMCNC: 34.6 G/DL (ref 31.5–35.7)
MCV RBC AUTO: 93.3 FL (ref 79–97)
MONOCYTES # BLD AUTO: 0.87 10*3/MM3 (ref 0.1–0.9)
MONOCYTES NFR BLD AUTO: 6.3 % (ref 5–12)
NEUTROPHILS NFR BLD AUTO: 11.41 10*3/MM3 (ref 1.7–7)
NEUTROPHILS NFR BLD AUTO: 83 % (ref 42.7–76)
NITRITE UR QL STRIP: NEGATIVE
NRBC BLD AUTO-RTO: 0 /100 WBC (ref 0–0.2)
PH UR STRIP.AUTO: 6.5 [PH] (ref 5–8)
PLATELET # BLD AUTO: 385 10*3/MM3 (ref 140–450)
PMV BLD AUTO: 10.4 FL (ref 6–12)
POTASSIUM SERPL-SCNC: 2.7 MMOL/L (ref 3.5–5.2)
POTASSIUM SERPL-SCNC: 2.9 MMOL/L (ref 3.5–5.2)
PROT SERPL-MCNC: 7.3 G/DL (ref 6–8.5)
PROT SERPL-MCNC: 7.4 G/DL (ref 6–8.5)
PROT UR QL STRIP: ABNORMAL
QT INTERVAL: 454 MS
QTC INTERVAL: 438 MS
RBC # BLD AUTO: 4.34 10*6/MM3 (ref 3.77–5.28)
RBC # UR STRIP: NORMAL /HPF
REF LAB TEST METHOD: NORMAL
SODIUM SERPL-SCNC: 135 MMOL/L (ref 136–145)
SODIUM SERPL-SCNC: 138 MMOL/L (ref 136–145)
SP GR UR STRIP: 1.02 (ref 1–1.03)
SQUAMOUS #/AREA URNS HPF: NORMAL /HPF
UROBILINOGEN UR QL STRIP: ABNORMAL
WBC # UR STRIP: NORMAL /HPF
WBC NRBC COR # BLD AUTO: 13.75 10*3/MM3 (ref 3.4–10.8)
WHOLE BLOOD HOLD COAG: NORMAL
WHOLE BLOOD HOLD SPECIMEN: NORMAL

## 2024-05-16 PROCEDURE — 96375 TX/PRO/DX INJ NEW DRUG ADDON: CPT

## 2024-05-16 PROCEDURE — 83690 ASSAY OF LIPASE: CPT | Performed by: EMERGENCY MEDICINE

## 2024-05-16 PROCEDURE — 25010000002 POTASSIUM CHLORIDE 10 MEQ/100ML SOLUTION: Performed by: EMERGENCY MEDICINE

## 2024-05-16 PROCEDURE — 96365 THER/PROPH/DIAG IV INF INIT: CPT

## 2024-05-16 PROCEDURE — 80053 COMPREHEN METABOLIC PANEL: CPT | Performed by: EMERGENCY MEDICINE

## 2024-05-16 PROCEDURE — 81001 URINALYSIS AUTO W/SCOPE: CPT | Performed by: EMERGENCY MEDICINE

## 2024-05-16 PROCEDURE — 93005 ELECTROCARDIOGRAM TRACING: CPT | Performed by: EMERGENCY MEDICINE

## 2024-05-16 PROCEDURE — 83605 ASSAY OF LACTIC ACID: CPT | Performed by: EMERGENCY MEDICINE

## 2024-05-16 PROCEDURE — 25510000001 IOPAMIDOL 61 % SOLUTION: Performed by: EMERGENCY MEDICINE

## 2024-05-16 PROCEDURE — 99285 EMERGENCY DEPT VISIT HI MDM: CPT

## 2024-05-16 PROCEDURE — 25010000002 ONDANSETRON PER 1 MG: Performed by: EMERGENCY MEDICINE

## 2024-05-16 PROCEDURE — 85025 COMPLETE CBC W/AUTO DIFF WBC: CPT | Performed by: EMERGENCY MEDICINE

## 2024-05-16 PROCEDURE — 25810000003 SODIUM CHLORIDE 0.9 % SOLUTION: Performed by: EMERGENCY MEDICINE

## 2024-05-16 PROCEDURE — 74177 CT ABD & PELVIS W/CONTRAST: CPT

## 2024-05-16 PROCEDURE — 87493 C DIFF AMPLIFIED PROBE: CPT

## 2024-05-16 RX ORDER — POTASSIUM CHLORIDE 750 MG/1
40 CAPSULE, EXTENDED RELEASE ORAL ONCE
Status: COMPLETED | OUTPATIENT
Start: 2024-05-16 | End: 2024-05-16

## 2024-05-16 RX ORDER — POTASSIUM CHLORIDE 20 MEQ/1
20 TABLET, EXTENDED RELEASE ORAL 2 TIMES DAILY
Qty: 10 TABLET | Refills: 0 | Status: SHIPPED | OUTPATIENT
Start: 2024-05-16

## 2024-05-16 RX ORDER — POTASSIUM CHLORIDE 7.45 MG/ML
10 INJECTION INTRAVENOUS ONCE
Status: COMPLETED | OUTPATIENT
Start: 2024-05-16 | End: 2024-05-16

## 2024-05-16 RX ORDER — ONDANSETRON 4 MG/1
4 TABLET, FILM COATED ORAL EVERY 6 HOURS PRN
Qty: 8 TABLET | Refills: 0 | Status: SHIPPED | OUTPATIENT
Start: 2024-05-16

## 2024-05-16 RX ORDER — SODIUM CHLORIDE 9 MG/ML
10 INJECTION, SOLUTION INTRAMUSCULAR; INTRAVENOUS; SUBCUTANEOUS AS NEEDED
Status: DISCONTINUED | OUTPATIENT
Start: 2024-05-16 | End: 2024-05-16 | Stop reason: HOSPADM

## 2024-05-16 RX ORDER — ONDANSETRON 2 MG/ML
4 INJECTION INTRAMUSCULAR; INTRAVENOUS ONCE
Status: COMPLETED | OUTPATIENT
Start: 2024-05-16 | End: 2024-05-16

## 2024-05-16 RX ADMIN — POTASSIUM CHLORIDE 10 MEQ: 7.46 INJECTION, SOLUTION INTRAVENOUS at 13:11

## 2024-05-16 RX ADMIN — IOPAMIDOL 80 ML: 612 INJECTION, SOLUTION INTRAVENOUS at 11:52

## 2024-05-16 RX ADMIN — POTASSIUM CHLORIDE 40 MEQ: 750 CAPSULE, EXTENDED RELEASE ORAL at 13:10

## 2024-05-16 RX ADMIN — SODIUM CHLORIDE 1000 ML: 9 INJECTION, SOLUTION INTRAVENOUS at 11:23

## 2024-05-16 RX ADMIN — ONDANSETRON 4 MG: 2 INJECTION INTRAMUSCULAR; INTRAVENOUS at 11:23

## 2024-05-16 NOTE — TELEPHONE ENCOUNTER
I have called to speak with the patient regarding her blood work this morning.  Patient is extremely dehydrated.  Potassium is at 2.9.  I recommended that she go ahead and head-on over to the ER to get IV fluid and potassium replacement especially with her history of A-fib.  Advised that potassium is likely due to her recurrent vomiting and diarrhea.  Stated that low potassium can also make her nausea and vomiting symptoms more cyclical.  Advised that she go as soon as possible.  Patient states that she will get up and get ready and had over that way here shortly.  Patient states she has not had any more episodes of vomiting overnight and was feeling okay as of now.  Will notify patient when I receive her C. difficile results back.

## 2024-05-20 NOTE — ED PROVIDER NOTES
Subjective   History of Present Illness  Patient is a pleasant 73-year-old female with history of atrial fibrillation who presents with 1 week of fatigue, nausea, vomiting, and diarrhea.  She has been seeing her primary care provider for this and states that laboratory test and C. difficile stool studies were ordered.  That you spoke with her PCP on the phone today and it was noted that her potassium was low.  Given the patient's persistent symptoms and hypokalemia she was instructed to come to the emergency department for further evaluation.    Fortunately, patient states that this morning, although still very tired and weak her vomiting and diarrhea is slightly improved.  Denies a definitive fever.  Denies chest pain or difficulty breathing.  Does admit to lower abdominal pain, waxing and waning.  Denies similar episodes in the past.      Review of Systems   All other systems reviewed and are negative.      Past Medical History:   Diagnosis Date    A-fib     Abnormal ECG     Allergic 28595    Arrhythmia     Arthritis     Arthritis of back     Back problem     Bronchitis     Cervical disc disorder     Chronic alcohol use     Chronic colitis     Fracture of hip     Fracture, femur     Glaucoma Years ago    Heart disease     Hip arthrosis     History of cardioversion     History of transesophageal echocardiography (YOLANDA)     Inflammatory bowel disease Years ago    Low back pain Years ago    Low back strain     Lumbosacral disc disease     Menopause     Mixed hyperlipidemia 10/11/2019    Palpitation     Benign palpitations    Scoliosis     Tibia/fibula fracture     Fall with right tibia/fibula fracture, status post tibia IM harish, September 2013.    Tobacco use     Visual impairment Years ago    Wears glasses        Allergies   Allergen Reactions    Hydrocodone-Acetaminophen Nausea Only    Ibuprofen GI Bleeding     Patient does not take due to being on blood thinners    Hydrocodone GI Intolerance    Moxifloxacin Unknown  (See Comments)    Penicillins Hives    Sulfa Antibiotics Nausea Only     nausea       Past Surgical History:   Procedure Laterality Date    ABLATION OF DYSRHYTHMIC FOCUS      CARDIAC ELECTROPHYSIOLOGY PROCEDURE N/A 06/26/2020    Procedure: PVA (paroxysmal), hold Sotalol 3 days, DNS Eliquis, Rhythmia (BSC) only using direct sense;  Surgeon: Isiah Garcia DO;  Location: [facility] INVASIVE LOC[facility];  Service: Cardiovascular;  Laterality: N/A;    CARDIOVERSION      COLONOSCOPY  2015    HAND SURGERY      LEG SURGERY Right     states five breaks, steal harish in R leg    TRIGGER POINT INJECTION         Family History   Problem Relation Age of Onset    Asthma Mother     No Known Problems Father     Heart disease Sister     Cancer Brother     No Known Problems Maternal Grandmother     No Known Problems Maternal Grandfather     No Known Problems Paternal Grandmother     No Known Problems Paternal Grandfather     No Known Problems Brother     Heart disease Other     Heart attack Other     Diabetes Other        Social History     Socioeconomic History    Marital status:    Tobacco Use    Smoking status: Some Days     Current packs/day: 0.25     Average packs/day: 0.3 packs/day for 67.5 years (16.9 ttl pk-yrs)     Types: Cigarettes     Start date: 1/1/1972     Passive exposure: Never    Smokeless tobacco: Never   Vaping Use    Vaping status: Never Used   Substance and Sexual Activity    Alcohol use: Yes     Alcohol/week: 2.0 standard drinks of alcohol     Types: 1 Glasses of wine, 1 Cans of beer per week     Comment: WEEKLY    Drug use: Never    Sexual activity: Not Currently     Partners: Male           Objective   Physical Exam  Vitals and nursing note reviewed.   Constitutional:       General: She is not in acute distress.     Appearance: Normal appearance.   HENT:      Head: Normocephalic and atraumatic.      Mouth/Throat:      Mouth: Mucous membranes are moist.   Eyes:      Conjunctiva/sclera: Conjunctivae normal.       Pupils: Pupils are equal, round, and reactive to light.   Neck:      Thyroid: No thyromegaly.   Cardiovascular:      Rate and Rhythm: Normal rate and regular rhythm.      Heart sounds: Normal heart sounds. No murmur heard.     No friction rub. No gallop.   Pulmonary:      Effort: Pulmonary effort is normal. No respiratory distress.      Breath sounds: Normal breath sounds.   Abdominal:      General: Bowel sounds are normal.      Palpations: Abdomen is soft.      Tenderness: There is abdominal tenderness (Mild, generalized lower abdomen).   Musculoskeletal:         General: Normal range of motion.      Cervical back: Normal range of motion and neck supple.   Lymphadenopathy:      Cervical: No cervical adenopathy.   Skin:     General: Skin is warm and dry.      Capillary Refill: Capillary refill takes less than 2 seconds.   Neurological:      General: No focal deficit present.      Mental Status: She is alert and oriented to person, place, and time.   Psychiatric:         Behavior: Behavior normal.         Thought Content: Thought content normal.         Procedures           ED Course       Latest Reference Range & Units 05/16/24 11:03 05/16/24 11:04   Sodium 136 - 145 mmol/L 138    Potassium 3.5 - 5.2 mmol/L 2.7 (L)    Chloride 98 - 107 mmol/L 100    CO2 22.0 - 29.0 mmol/L 25.0    Anion Gap 5.0 - 15.0 mmol/L 13.0    BUN 8 - 23 mg/dL 10    Creatinine 0.57 - 1.00 mg/dL 0.56 (L)    BUN/Creatinine Ratio 7.0 - 25.0  17.9    eGFR >60.0 mL/min/1.73 97.1    Glucose 65 - 99 mg/dL 121 (H)    Calcium 8.6 - 10.5 mg/dL 10.1    Alkaline Phosphatase 39 - 117 U/L 83    Total Protein 6.0 - 8.5 g/dL 7.4    Albumin 3.5 - 5.2 g/dL 4.2    Globulin gm/dL 3.2    A/G Ratio g/dL 1.3    AST (SGOT) 1 - 32 U/L 21    ALT (SGPT) 1 - 33 U/L 23    Total Bilirubin 0.0 - 1.2 mg/dL 0.4    Lactate 0.5 - 2.0 mmol/L 1.6    Lipase 13 - 60 U/L 27    WBC 3.40 - 10.80 10*3/mm3 13.75 (H)    RBC 3.77 - 5.28 10*6/mm3 4.34    Hemoglobin 12.0 - 15.9 g/dL 14.0     Hematocrit 34.0 - 46.6 % 40.5    Platelets 140 - 450 10*3/mm3 385    RDW 12.3 - 15.4 % 11.9 (L)    MCV 79.0 - 97.0 fL 93.3    MCH 26.6 - 33.0 pg 32.3    MCHC 31.5 - 35.7 g/dL 34.6    MPV 6.0 - 12.0 fL 10.4    RDW-SD 37.0 - 54.0 fl 41.1    Neutrophil Rel % 42.7 - 76.0 % 83.0 (H)    Lymphocyte Rel % 19.6 - 45.3 % 8.9 (L)    Monocyte Rel % 5.0 - 12.0 % 6.3    Eosinophil Rel % 0.3 - 6.2 % 0.9    Basophil Rel % 0.0 - 1.5 % 0.4    Immature Granulocyte Rel % 0.0 - 0.5 % 0.5    Neutrophils Absolute 1.70 - 7.00 10*3/mm3 11.41 (H)    Lymphocytes Absolute 0.70 - 3.10 10*3/mm3 1.22    Monocytes Absolute 0.10 - 0.90 10*3/mm3 0.87    Eosinophils Absolute 0.00 - 0.40 10*3/mm3 0.13    Basophils Absolute 0.00 - 0.20 10*3/mm3 0.05    Immature Grans, Absolute 0.00 - 0.05 10*3/mm3 0.07 (H)    nRBC 0.0 - 0.2 /100 WBC 0.0    Color, UA Yellow, Straw   Yellow   Appearance, UA Clear   Clear   Specific Gravity, UA 1.005 - 1.030   1.020   pH, UA 5.0 - 8.0   6.5   Glucose Negative   Negative   Ketones, UA Negative   Negative   Blood, UA Negative   Negative   Nitrite, UA Negative   Negative   Leukocytes, UA Negative   Negative   Protein, UA Negative   30 mg/dL (1+) !   Bilirubin, UA Negative   Small (1+) !   Urobilinogen, UA 0.2 - 1.0 E.U./dL   0.2 E.U./dL   RBC, UA None Seen, 0-2 /HPF  None Seen   WBC, UA None Seen, 0-2 /HPF  0-2   Bacteria, UA None Seen, Trace /HPF  Trace   Squamous Epithelial Cells, UA None Seen, 0-2 /HPF  0-2   Hyaline Casts, UA 0 - 6 /LPF  None Seen   Methodology:   Automated Microscopy   (L): Data is abnormally low  (H): Data is abnormally high  !: Data is abnormal    CT Abdomen Pelvis With Contrast   Final Result   Impression:   1.No acute process is identified.   2.There is a new right inguinal hernia containing a short segment of nonobstructed, noninflamed small intestine.   3.Other stable chronic findings.            Electronically Signed: Talon Guillaume MD     5/16/2024 12:10 PM EDT     Workstation ID: [id number]     "    Vitals:    05/16/24 1014   BP: 142/88   BP Location: Left arm   Patient Position: Sitting   Pulse: 65   Resp: 16   Temp: 98.1 °F (36.7 °C)   TempSrc: Oral   SpO2: 97%   Weight: 48.1 kg (106 lb)   Height: 162.6 cm (64\")     Medications   ondansetron (ZOFRAN) injection 4 mg (4 mg Intravenous Given 5/16/24 1123)   sodium chloride 0.9 % bolus 1,000 mL (0 mL Intravenous Stopped 5/16/24 1153)   iopamidol (ISOVUE-300) 61 % injection 100 mL (80 mL Intravenous Given 5/16/24 1152)   potassium chloride (MICRO-K/KLOR-CON) CR capsule (40 mEq Oral Given 5/16/24 1310)     And   potassium chloride 10 mEq in 100 mL IVPB (0 mEq Intravenous Stopped 5/16/24 1524)     ECG/EMG Results (last 24 hours)       Procedure Component Value Units Date/Time    ECG 12 Lead Rhythm Change [[id number]] Collected: 05/16/24 1131     Updated: 05/16/24 1202     QT Interval 454 ms      QTC Interval 438 ms     Narrative:      Test Reason : Rhythm Change  Blood Pressure :   */*   mmHG  Vent. Rate :  56 BPM     Atrial Rate :  56 BPM     P-R Int : 140 ms          QRS Dur :  84 ms      QT Int : 454 ms       P-R-T Axes :  94  88  86 degrees     QTc Int : 438 ms    Sinus bradycardia  ST & T wave abnormality, consider inferior ischemia  Abnormal ECG  When compared with ECG of 16-OCT-2019 09:05,  ST now depressed in Anterior leads  Nonspecific T wave abnormality now evident in Lateral leads    Referred By: EDMD           Confirmed By:           ECG 12 Lead Rhythm Change   Preliminary Result   Test Reason : Rhythm Change   Blood Pressure :   */*   mmHG   Vent. Rate :  56 BPM     Atrial Rate :  56 BPM      P-R Int : 140 ms          QRS Dur :  84 ms       QT Int : 454 ms       P-R-T Axes :  94  88  86 degrees      QTc Int : 438 ms      Sinus bradycardia   ST & T wave abnormality, consider inferior ischemia   Abnormal ECG   When compared with ECG of 16-OCT-2019 09:05,   ST now depressed in Anterior leads   Nonspecific T wave abnormality now evident in Lateral leads "      Referred By: EDMD           Confirmed By:                                                  Medical Decision Making  Problems Addressed:  Dehydration: complicated acute illness or injury  Diarrhea, unspecified type: complicated acute illness or injury  Hiatal hernia: complicated acute illness or injury  Hypokalemia: complicated acute illness or injury  Nausea: complicated acute illness or injury    Amount and/or Complexity of Data Reviewed  External Data Reviewed: notes.  Labs: ordered. Decision-making details documented in ED Course.  Radiology: ordered and independent interpretation performed. Decision-making details documented in ED Course.  ECG/medicine tests: ordered and independent interpretation performed. Decision-making details documented in ED Course.    Risk  Prescription drug management.        Final diagnoses:   Dehydration   Hypokalemia   Diarrhea, unspecified type   Nausea   Hiatal hernia       ED Disposition  ED Disposition       ED Disposition   Discharge    Condition   Stable    Comment   --           DISCHARGE    Patient discharged in stable condition.    Reviewed implications of results, diagnosis, meds, responsibility to follow up, warning signs and symptoms of possible worsening, potential complications and reasons to return to ER.    Patient/Family voiced understanding of above instructions.    Discussed plan for discharge, as there is no emergent indication for admission.  Pt/family is agreeable and understands need for follow up and possible repeat testing.  Pt/family is aware that discharge does not mean that nothing is wrong but that it indicates no emergency is currently present that requires admission and they must continue care with follow-up as given below or with a physician of their choice.     FOLLOW-UP  Anand Pineda DO  [address] [facility] [address] [phone]    Schedule an appointment as soon as possible for a visit       [facility]  EMERGENCY DEPARTMENT  [address]  [place] [id number] [phone]    If symptoms worsen    Naren Chen MD  [address]  [facility] [place] [address]  [place] [address]  [phone]    Schedule an appointment as soon as possible for a visit            Medication List        New Prescriptions      ondansetron 4 MG tablet  Commonly known as: ZOFRAN  Take 1 tablet by mouth Every 6 (Six) Hours As Needed for Nausea or Vomiting.     potassium chloride 20 MEQ CR tablet  Commonly known as: KLOR-CON M20  Take 1 tablet by mouth 2 (Two) Times a Day.               Where to Get Your Medications        These medications were sent to [facility] - [place], [place] - [facility] [address] - [phone]  - [phone]   [address], [place] [address]      Phone: [phone]   ondansetron 4 MG tablet  potassium chloride 20 MEQ CR tablet            Woody Pratt,   05/20/24 1516       Woody Pratt,   05/20/24 1520

## 2024-05-27 LAB
QT INTERVAL: 454 MS
QTC INTERVAL: 438 MS

## 2024-06-06 ENCOUNTER — OFFICE VISIT (OUTPATIENT)
Dept: FAMILY MEDICINE CLINIC | Facility: CLINIC | Age: 74
End: 2024-06-06
Payer: MEDICARE

## 2024-06-06 VITALS
OXYGEN SATURATION: 97 % | SYSTOLIC BLOOD PRESSURE: 108 MMHG | BODY MASS INDEX: 18.19 KG/M2 | TEMPERATURE: 97.5 F | DIASTOLIC BLOOD PRESSURE: 54 MMHG | WEIGHT: 106 LBS | HEART RATE: 78 BPM

## 2024-06-06 DIAGNOSIS — J44.9 COPD, MILD: ICD-10-CM

## 2024-06-06 DIAGNOSIS — M70.62 GREATER TROCHANTERIC BURSITIS OF LEFT HIP: Primary | ICD-10-CM

## 2024-06-06 PROCEDURE — 1125F AMNT PAIN NOTED PAIN PRSNT: CPT | Performed by: FAMILY MEDICINE

## 2024-06-06 PROCEDURE — 20551 NJX 1 TENDON ORIGIN/INSJ: CPT | Performed by: FAMILY MEDICINE

## 2024-06-06 PROCEDURE — 1159F MED LIST DOCD IN RCRD: CPT | Performed by: FAMILY MEDICINE

## 2024-06-06 PROCEDURE — 1160F RVW MEDS BY RX/DR IN RCRD: CPT | Performed by: FAMILY MEDICINE

## 2024-06-06 PROCEDURE — 99213 OFFICE O/P EST LOW 20 MIN: CPT | Performed by: FAMILY MEDICINE

## 2024-06-06 RX ORDER — HYDROCODONE BITARTRATE AND ACETAMINOPHEN 5; 325 MG/1; MG/1
1-2 TABLET ORAL DAILY PRN
Qty: 10 TABLET | Refills: 0 | Status: SHIPPED | OUTPATIENT
Start: 2024-06-06

## 2024-06-06 RX ORDER — ALBUTEROL SULFATE 90 UG/1
2 AEROSOL, METERED RESPIRATORY (INHALATION) 2 TIMES DAILY
Qty: 8 G | Refills: 1 | Status: SHIPPED | OUTPATIENT
Start: 2024-06-06

## 2024-06-06 RX ORDER — TRIAMCINOLONE ACETONIDE 40 MG/ML
20 INJECTION, SUSPENSION INTRA-ARTICULAR; INTRAMUSCULAR ONCE
Status: COMPLETED | OUTPATIENT
Start: 2024-06-06 | End: 2024-06-06

## 2024-06-06 RX ADMIN — TRIAMCINOLONE ACETONIDE 20 MG: 40 INJECTION, SUSPENSION INTRA-ARTICULAR; INTRAMUSCULAR at 14:09

## 2024-06-06 NOTE — PROGRESS NOTES
Established Patient Office Visit      Patient Name: Mar Joseph  : 1950   MRN: [id number]   Care Team: Patient Care Team:  Anand Pineda DO as PCP - General (Family Medicine)  Isiah Garcia DO as Consulting Physician (Cardiology)  Kirk Ocasio III, MD as Cardiologist (Cardiology)  Artur Figueredo PA as Physician Assistant (Cardiology)    Chief Complaint:    Chief Complaint   Patient presents with    Hip Pain       History of Present Illness: Mar Joseph is a 73 y.o. female who is here today for chief complaint.    HPI    Patient presents today for left hip pain.  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  They are going to [place] for 2-week trip to see family, trip that has been planned for years.  They are leaving next week and she is having increased pain over the last couple of weeks she was told by orthopedist that it is too soon for another intra-articular hip injection.    This patient is accompanied by their  who contributes to the history of their care.    The following portions of the patient's history were reviewed and updated as appropriate: allergies, current medications, past family history, past medical history, past social history, past surgical history and problem list.    Subjective      Review of Systems:   Review of Systems - See HPI    Past Medical History:   Past Medical History:   Diagnosis Date    A-fib     Abnormal ECG     Allergic 99147    Arrhythmia     Arthritis     Arthritis of back     Back problem     Bronchitis     Cervical disc disorder     Chronic alcohol use     Chronic colitis     Fracture of hip     Fracture, femur     Glaucoma Years ago    Heart disease     Hip arthrosis     History of cardioversion     History of transesophageal echocardiography ([facility]ANDA)     Inflammatory bowel disease Years ago    Low back pain Years ago    Low back strain     Lumbosacral  disc disease     Menopause     Mixed hyperlipidemia 10/11/2019    Palpitation     Benign palpitations    Scoliosis     Tibia/fibula fracture     Fall with right tibia/fibula fracture, status post tibia IM harish, September 2013.    Tobacco use     Visual impairment Years ago    Wears glasses        Past Surgical History:   Past Surgical History:   Procedure Laterality Date    ABLATION OF DYSRHYTHMIC FOCUS      CARDIAC ELECTROPHYSIOLOGY PROCEDURE N/A 06/26/2020    Procedure: PVA (paroxysmal), hold Sotalol 3 days, DNS Eliquis, Rhythmia (BSC) only using direct sense;  Surgeon: Isiah Garcia DO;  Location: [facility] INVASIVE LOCATION;  Service: Cardiovascular;  Laterality: N/A;    CARDIOVERSION      COLONOSCOPY  2015    HAND SURGERY      LEG SURGERY Right     states five breaks, steal harish in R leg    TRIGGER POINT INJECTION         Family History:   Family History   Problem Relation Age of Onset    Asthma Mother     No Known Problems Father     Heart disease Sister     Cancer Brother     No Known Problems Maternal Grandmother     No Known Problems Maternal Grandfather     No Known Problems Paternal Grandmother     No Known Problems Paternal Grandfather     No Known Problems Brother     Heart disease Other     Heart attack Other     Diabetes Other        Social History:   Social History     Socioeconomic History    Marital status:    Tobacco Use    Smoking status: Former     Current packs/day: 0.25     Average packs/day: 0.3 packs/day for 67.5 years (16.9 ttl pk-yrs)     Types: Cigarettes     Start date: 1/1/1972     Passive exposure: Never    Smokeless tobacco: Never   Vaping Use    Vaping status: Never Used   Substance and Sexual Activity    Alcohol use: Yes     Alcohol/week: 2.0 standard drinks of alcohol     Types: 1 Glasses of wine, 1 Cans of beer per week     Comment: WEEKLY    Drug use: Never    Sexual activity: Not Currently     Partners: Male       Tobacco History:   Social History     Tobacco Use    Smoking Status Former    Current packs/day: 0.25    Average packs/day: 0.3 packs/day for 67.5 years (16.9 ttl pk-yrs)    Types: Cigarettes    Start date: 1/1/1972    Passive exposure: Never   Smokeless Tobacco Never       Medications:     Current Outpatient Medications:     acetaminophen (TYLENOL) 500 MG tablet, Take 1 tablet by mouth Every 6 (Six) Hours As Needed for Mild Pain., Disp: , Rfl:     apixaban (Eliquis) 5 MG tablet tablet, Take 1 tablet by mouth 2 (Two) Times a Day., Disp: 180 tablet, Rfl: 3    FLUoxetine (PROzac) 40 MG capsule, Take 1 capsule by mouth Daily., Disp: 30 capsule, Rfl: 5    gabapentin (NEURONTIN) 400 MG capsule, Take  by mouth 2 (Two) Times a Day., Disp: , Rfl:     multivitamin with minerals (MULTIVITAMIN ADULTS 50+ PO), Take 1 tablet by mouth 2 (Two) Times a Day., Disp: , Rfl:     ondansetron (ZOFRAN) 4 MG tablet, Take 1 tablet by mouth Every 6 (Six) Hours As Needed for Nausea or Vomiting., Disp: 8 tablet, Rfl: 0    potassium chloride (KLOR-CON M20) 20 MEQ CR tablet, Take 1 tablet by mouth 2 (Two) Times a Day., Disp: 10 tablet, Rfl: 0    sotalol (Betapace) 80 MG tablet, Take 1 tablet by mouth 2 (Two) Times a Day., Disp: 180 tablet, Rfl: 3    traMADol (ULTRAM) 50 MG tablet, Take 1 tablet by mouth 4 (Four) Times a Day., Disp: , Rfl:     travoprost, BAK free, (TRAVATAN) 0.004 % solution ophthalmic solution, travoprost 0.004 % eye drops  Instill by ophthalmic route., Disp: , Rfl:     albuterol sulfate  (90 Base) MCG/ACT inhaler, Inhale 2 puffs 2 (Two) Times a Day., Disp: 8 g, Rfl: 1    HYDROcodone-acetaminophen (NORCO) 5-325 MG per tablet, Take 1-2 tablets by mouth Daily As Needed for Severe Pain., Disp: 10 tablet, Rfl: 0  No current facility-administered medications for this visit.    Allergies:   Allergies   Allergen Reactions    Hydrocodone-Acetaminophen Nausea Only    Ibuprofen GI Bleeding     Patient does not take due to being on blood thinners    Hydrocodone GI Intolerance     Moxifloxacin Unknown (See Comments)    Penicillins Hives    Sulfa Antibiotics Nausea Only     nausea       Objective   Objective     Physical Exam:  Vital Signs:   Vitals:    06/06/24 1323   BP: 108/54   BP Location: Left arm   Patient Position: Sitting   Cuff Size: Adult   Pulse: 78   Temp: 97.5 °F (36.4 °C)   SpO2: 97%   Weight: 48.1 kg (106 lb)   PainSc: 10-Worst pain ever     Body mass index is 18.19 kg/m².     Physical Exam  Nursing note reviewed  Const: NAD, A&Ox4, Pleasant, Cooperative  Eyes: EOMI, no conjunctivitis  ENT: No nasal discharge present, neck supple  Cardiac: Regular rate and rhythm, no cyanosis  Resp: Respiratory rate within normal limits, no increased work of breathing, no audible wheezing or retractions noted  GI: No distention or ascites  MSK: Motor and sensation grossly intact in bilateral upper extremities  Neurologic: CN II-XII grossly intact  Psych: Appropriate mood and behavior.  Skin: Warm, dry  Procedures/Radiology     Procedures  No radiology results for the last 7 days   Procedure: Injection of the left greater trochanteric bursa  Prior to performance of the trochanteric bursa injection, a discussion of this procedure and alternative treatments was conducted with the patient.  Possible complications were discussed, and all questions were answered.  An informed consent was reviewed with the patient, and a signed copy will be scanned into the chart.    Lateral Approach  After informed consent was obtained and signed, the patient was placed in a lateral decubitus position on the unaffected side with the hips at about 10° of flexion. Tenderness was identified over the left greater trochanteric bursa and marked with the retracted tip of a ballpoint pen.  The injection site was aseptically prepped with alcohol pads.  Ethyl chloride topical vapocoolant spray was used to achieve good local anesthesia.    Following the no touch technique, a 1.5-inch 25-gauge needle was inserted perpendicular  to the skin until contact with the femoral trochanter was made.  The needle was retracted about 2 mm.  After confirmation that there was no vascular infiltration of the needle tip, a mixture of 2 mL of 0.5% ropivacaine and 0.5mL (20 mg) of triamcinolone acetonide 40 mg/mL was injected easily into the trochanteric bursa.  No resistance was encountered.    The needle was withdrawn.  No bleeding was encountered.  The injection site was cleaned and a dry sterile dressing was applied.    The patient tolerated the procedure well without complications. she reported complete relief of pain within 5 minutes.   Assessment & Plan   Assessment / Plan      Assessment/Plan:   Problems Addressed This Visit  Diagnoses and all orders for this visit:    1. Greater trochanteric bursitis of left hip (Primary)  -     HYDROcodone-acetaminophen (NORCO) 5-325 MG per tablet; Take 1-2 tablets by mouth Daily As Needed for Severe Pain.  Dispense: 10 tablet; Refill: 0  -     triamcinolone acetonide (KENALOG-40) injection 20 mg    2. COPD, mild  -     albuterol sulfate  (90 Base) MCG/ACT inhaler; Inhale 2 puffs 2 (Two) Times a Day.  Dispense: 8 g; Refill: 1      Problem List Items Addressed This Visit    None  Visit Diagnoses       Greater trochanteric bursitis of left hip    -  Primary    Relevant Medications    HYDROcodone-acetaminophen (NORCO) 5-325 MG per tablet    triamcinolone acetonide (KENALOG-40) injection 20 mg (Completed)    COPD, mild        Relevant Medications    triamcinolone acetonide (KENALOG-40) injection 20 mg (Completed)    albuterol sulfate  (90 Base) MCG/ACT inhaler          Her pain today is certainly primarily from the hip arthritis and capsulitis, however she does have some lateral tenderness and pain over the greater trochanter.  We discussed possible treatment options at length, and after discussion she elected to proceed with an injection of the left greater trochanteric bursa.  Overall pain will not  likely be completely relieved, but if it can give us a 10% improvement that would be something.  Also provider for a's very small limited number of hydrocodone to take with her on her trip for severe pain.  She cannot take NSAIDs due to chronic Eliquis treatment.    PFTs showed very mild obstruction and mildly decreased DLCO.  She has been smoke-free for 5 months, has been able to maintain her well.  She is having some increased cough and mucus production, recommended Mucinex as needed.  Offered her inhaler for home use as well if needed.      Patient Instructions   INSTRUCTIONS TO FOLLOW AFTER A CORTISONE INJECTION    1. The pain relieving medicine in the shot will wear off in the next 12-18 hours. After this, the pain at the site may return for a short time until the steroid takes effect 24-48 hours later.  2. Use ice tonight 30-60 minutes or longer to minimize swelling and discomfort that might follow the injection. (Either crushed ice in a plastic bag or crushed ice in an ice cap).  3. Begin heat tomorrow for at least one hour every day for one week. Place hot moist towels over the injection area, cover towel with plastic then apply a heating pad over the entire area. You may use contrast therapy, with ice for 20 minutes immediately following the heat.  4. Relative rest should be undertaken. You may resume normal non-painful activities.     Follow Up:   No follow-ups on file.      DO LASHON Gilmore RD  [facility]  [address]  [facility] [phone]  Fax [phone]  Phone [phone]

## 2024-07-01 ENCOUNTER — OFFICE VISIT (OUTPATIENT)
Dept: ORTHOPEDIC SURGERY | Facility: CLINIC | Age: 74
End: 2024-07-01
Payer: MEDICARE

## 2024-07-01 VITALS
BODY MASS INDEX: 18.1 KG/M2 | SYSTOLIC BLOOD PRESSURE: 110 MMHG | HEIGHT: 64 IN | DIASTOLIC BLOOD PRESSURE: 70 MMHG | WEIGHT: 106 LBS

## 2024-07-01 DIAGNOSIS — M16.12 PRIMARY OSTEOARTHRITIS OF LEFT HIP: Primary | ICD-10-CM

## 2024-07-01 PROBLEM — M16.9 DEGENERATIVE ARTHRITIS OF HIP: Status: ACTIVE | Noted: 2024-07-01

## 2024-07-01 PROCEDURE — 99214 OFFICE O/P EST MOD 30 MIN: CPT | Performed by: ORTHOPAEDIC SURGERY

## 2024-07-01 PROCEDURE — 1159F MED LIST DOCD IN RCRD: CPT | Performed by: ORTHOPAEDIC SURGERY

## 2024-07-01 PROCEDURE — 1160F RVW MEDS BY RX/DR IN RCRD: CPT | Performed by: ORTHOPAEDIC SURGERY

## 2024-07-01 RX ORDER — ACETAMINOPHEN 325 MG/1
1000 TABLET ORAL ONCE
OUTPATIENT
Start: 2024-07-01 | End: 2024-07-01

## 2024-07-01 RX ORDER — PREGABALIN 150 MG/1
150 CAPSULE ORAL ONCE
OUTPATIENT
Start: 2024-07-01 | End: 2024-07-01

## 2024-07-01 RX ORDER — CHLORHEXIDINE GLUCONATE 40 MG/ML
1 SOLUTION TOPICAL DAILY
Qty: 237 ML | Refills: 0 | Status: SHIPPED | OUTPATIENT
Start: 2024-07-01

## 2024-07-01 RX ORDER — TRAMADOL HYDROCHLORIDE 50 MG/1
50 TABLET ORAL EVERY 8 HOURS PRN
Qty: 30 TABLET | Refills: 0 | Status: SHIPPED | OUTPATIENT
Start: 2024-07-01

## 2024-07-01 NOTE — PROGRESS NOTES
[facility] [place] [facility] Note    Subjective     Chief Complaint   Patient presents with    Follow-up     2 months follow up-- Left hip arthritis             HPI    It has been 2  month(s) since Ms. Joseph's last visit. She returns to clinic today for follow-up of left hip arthritis. The issue has been ongoing for 1 year(s) 2 months. She rates her pain a 10/10 on the pain scale. Previous/current treatments: NSAIDS. Current symptoms: pain, popping, stiffness, and giving way/buckling. The pain is worse with any movement of the joint; lying down improve the pain. Overall, she is doing worse.  She would like to proceed with hip replacement surgery.  She has exhausted conservative treatment.  She is requesting a prescription for tramadol in the meantime before proceeding with surgery.  She did have a bursal hip injection on 6/6/2024 with her primary care physician.  No history of clots or clotting disorders.  Her  is able to help out postoperatively.  She is on Eliquis for atrial fibrillation, and her cardiologist are Dr. Ocasio and Dr. Garcia.    I have reviewed the following portions of the patient's history and agree with: History of Present Illness and Review of Systems    Patient Active Problem List   Diagnosis    Paroxysmal atrial fibrillation    Tobacco use    Chronic colitis    Mixed hyperlipidemia    Anxiety    Chronic anticoagulation    Long term current use of antiarrhythmic drug    Spinal stenosis of lumbar region    Degenerative arthritis of hip     Past Medical History:   Diagnosis Date    A-fib     Abnormal ECG     Allergic 17158    Arrhythmia     Arthritis     Arthritis of back     Back problem     Bronchitis     Cervical disc disorder     Chronic alcohol use     Chronic colitis     Fracture of hip     Fracture, femur     Glaucoma Years ago    Heart disease     Hip arthrosis     History of cardioversion     History of transesophageal echocardiography (YOLANDA)     Inflammatory bowel disease  Years ago    Low back pain Years ago    Low back strain     Lumbosacral disc disease     Menopause     Mixed hyperlipidemia 10/11/2019    Palpitation     Benign palpitations    Scoliosis     Tibia/fibula fracture     Fall with right tibia/fibula fracture, status post tibia IM harish, September 2013.    Tobacco use     Visual impairment Years ago    Wears glasses       Past Surgical History:   Procedure Laterality Date    ABLATION OF DYSRHYTHMIC FOCUS      CARDIAC ELECTROPHYSIOLOGY PROCEDURE N/A 06/26/2020    Procedure: PVA (paroxysmal), hold Sotalol 3 days, DNS Eliquis, Rhythmia (BSC) only using direct sense;  Surgeon: Isiah Garcia DO;  Location: [facility] INVASIVE LOCATION;  Service: Cardiovascular;  Laterality: N/A;    CARDIOVERSION      COLONOSCOPY  2015    HAND SURGERY      LEG SURGERY Right     states five breaks, steal harish in R leg    TRIGGER POINT INJECTION        Family History   Problem Relation Age of Onset    Asthma Mother     No Known Problems Father     Heart disease Sister     Cancer Brother     No Known Problems Maternal Grandmother     No Known Problems Maternal Grandfather     No Known Problems Paternal Grandmother     No Known Problems Paternal Grandfather     No Known Problems Brother     Heart disease Other     Heart attack Other     Diabetes Other      Social History     Socioeconomic History    Marital status:    Tobacco Use    Smoking status: Former     Current packs/day: 0.25     Average packs/day: 0.3 packs/day for 67.6 years (16.9 ttl pk-yrs)     Types: Cigarettes     Start date: 1/1/1972     Passive exposure: Never    Smokeless tobacco: Never   Vaping Use    Vaping status: Never Used   Substance and Sexual Activity    Alcohol use: Yes     Alcohol/week: 2.0 standard drinks of alcohol     Types: 1 Glasses of wine, 1 Cans of beer per week     Comment: WEEKLY    Drug use: Never    Sexual activity: Not Currently     Partners: Male      Current Outpatient Medications on File Prior to  Visit   Medication Sig Dispense Refill    acetaminophen (TYLENOL) 500 MG tablet Take 1 tablet by mouth Every 6 (Six) Hours As Needed for Mild Pain.      albuterol sulfate  (90 Base) MCG/ACT inhaler Inhale 2 puffs 2 (Two) Times a Day. 8 g 1    apixaban (Eliquis) 5 MG tablet tablet Take 1 tablet by mouth 2 (Two) Times a Day. 180 tablet 3    FLUoxetine (PROzac) 40 MG capsule Take 1 capsule by mouth Daily. 30 capsule 5    gabapentin (NEURONTIN) 400 MG capsule Take  by mouth 2 (Two) Times a Day.      HYDROcodone-acetaminophen (NORCO) 5-325 MG per tablet Take 1-2 tablets by mouth Daily As Needed for Severe Pain. 10 tablet 0    multivitamin with minerals (MULTIVITAMIN ADULTS 50+ PO) Take 1 tablet by mouth 2 (Two) Times a Day.      ondansetron (ZOFRAN) 4 MG tablet Take 1 tablet by mouth Every 6 (Six) Hours As Needed for Nausea or Vomiting. 8 tablet 0    potassium chloride (KLOR-CON M20) 20 MEQ CR tablet Take 1 tablet by mouth 2 (Two) Times a Day. 10 tablet 0    sotalol (Betapace) 80 MG tablet Take 1 tablet by mouth 2 (Two) Times a Day. 180 tablet 3    traMADol (ULTRAM) 50 MG tablet Take 1 tablet by mouth 4 (Four) Times a Day.      travoprost, BAK free, (TRAVATAN) 0.004 % solution ophthalmic solution travoprost 0.004 % eye drops   Instill by ophthalmic route.       No current facility-administered medications on file prior to visit.      Allergies   Allergen Reactions    Hydrocodone-Acetaminophen Nausea Only    Ibuprofen GI Bleeding     Patient does not take due to being on blood thinners    Hydrocodone GI Intolerance    Moxifloxacin Unknown (See Comments)    Penicillins Hives    Sulfa Antibiotics Nausea Only     nausea        Review of Systems   Constitutional:  Negative for activity change, appetite change, chills, diaphoresis, fatigue, fever and unexpected weight change.   HENT:  Negative for congestion, dental problem, drooling, ear discharge, ear pain, facial swelling, hearing loss, mouth sores, nosebleeds,  "postnasal drip, rhinorrhea, sinus pressure, sneezing, sore throat, tinnitus, trouble swallowing and voice change.    Eyes:  Negative for photophobia, pain, discharge, redness, itching and visual disturbance.   Respiratory:  Negative for apnea, cough, choking, chest tightness, shortness of breath, wheezing and stridor.    Cardiovascular:  Negative for chest pain, palpitations and leg swelling.   Gastrointestinal:  Negative for abdominal distention, abdominal pain, anal bleeding, blood in stool, constipation, diarrhea, nausea, rectal pain and vomiting.   Endocrine: Negative for cold intolerance, heat intolerance, polydipsia, polyphagia and polyuria.   Genitourinary:  Negative for decreased urine volume, difficulty urinating, dysuria, enuresis, flank pain, frequency, genital sores, hematuria and urgency.   Musculoskeletal:  Positive for arthralgias. Negative for back pain, gait problem, joint swelling, myalgias, neck pain and neck stiffness.   Skin:  Negative for color change, pallor, rash and wound.   Allergic/Immunologic: Negative for environmental allergies, food allergies and immunocompromised state.   Neurological:  Negative for dizziness, tremors, seizures, syncope, facial asymmetry, speech difficulty, weakness, light-headedness, numbness and headaches.   Hematological:  Negative for adenopathy. Does not bruise/bleed easily.   Psychiatric/Behavioral:  Negative for agitation, behavioral problems, confusion, decreased concentration, dysphoric mood, hallucinations, self-injury, sleep disturbance and suicidal ideas. The patient is not nervous/anxious and is not hyperactive.         Objective      Physical Exam  /70   Ht 162.6 cm (64\")   Wt 48.1 kg (106 lb)   LMP  (LMP Unknown)   BMI 18.19 kg/m²     Body mass index is 18.19 kg/m².  BMI is below normal parameters (malnutrition). Recommendations: referral to primary care      General:   Mental Status:  Alert   Appearance: Cooperative, in no acute " distress   Build and Nutrition: Thin female   Orientation: Alert and oriented to person, place and time   Posture: Normal   Gait: Limp on the left with a cane    Lower Extremity:              Left Hip:                          Tenderness:    None                          Swelling:          None                          Crepitus:          None                          Atrophy:           None                          Range of motion:        External Rotation:       20°                                                              Internal Rotation:        10°, with pain                                                              Flexion:                       90°                                                              Extension:                   0°           Instability:        None  Deformities:     None  Functional testing: Positive Stinchfield                          Short on the left compared to the right    Imaging/Studies  Imaging Results (Last 24 Hours)       Procedure Component Value Units Date/Time    XR Hip With or Without Pelvis 2 - 3 View Left [[id number]] Resulted: 07/01/24 1525     Updated: 07/01/24 1527    Narrative:      Left Hip Radiographs  Indication: left hip pain  Views: low AP pelvis and lateral of the left hip    Comparison: 3/20/2024    Findings:   Worsening arthritis, left hip, with flattening of the femoral head,   subchondral sclerosis, no acute bony abnormalities.  Scoliosis in the   lumbar spine noted partially visualized on the plain imaging today.                Assessment and Plan     Diagnoses and all orders for this visit:    1. Primary osteoarthritis of left hip (Primary)  -     XR Hip With or Without Pelvis 2 - 3 View Left  -     Case Request; Standing  -     Instructions on coughing, deep breathing, and incentive spirometry.; Future  -     CBC and Differential; Future  -     Basic metabolic panel; Future  -     Protime-INR; Future  -     APTT; Future  -     Hemoglobin A1c;  Future  -     Sedimentation rate; Future  -     C-reactive protein; Future  -     Tranexamic Acid 1,000 mg in sodium chloride 0.9 % 100 mL  -     Tranexamic Acid 1,000 mg in sodium chloride 0.9 % 100 mL  -     ethyl alcohol 62 % 2 each  -     ceFAZolin (ANCEF) 2 g in sodium chloride 0.9 % 100 mL IVPB  -     acetaminophen (TYLENOL) tablet 975 mg  -     pregabalin (LYRICA) capsule 150 mg  -     Case Request    Other orders  -     Outpatient In A Bed; Standing  -     Follow Anesthesia Guidelines / Protocol; Future  -     Follow Anesthesia Guidelines / Protocol; Standing  -     Verify NPO Status; Standing  -     Verify The Time Patient Completed ERAS Hydration Drink; Standing  -     SCD (sequential compression device)- to be placed on patient in Pre-op; Standing  -     Clip operative site; Standing  -     Obtain informed consent (if not collected inpatient or PAT); Standing  -     Obtain Informed Consent; Future  -     Provide Patient With Carbo Loading Instructions  -     Provide Patient With ERAS Booklet(s)/Handout  -     Chlorhexidine Gluconate 4 % solution; Apply 1 Application topically to the appropriate area as directed Daily. Shower with hibiclens solution as directed for 5 days prior to surgery  Dispense: 237 mL; Refill: 0  -     traMADol (ULTRAM) 50 MG tablet; Take 1 tablet by mouth Every 8 (Eight) Hours As Needed for Moderate Pain.  Dispense: 30 tablet; Refill: 0        1. Primary osteoarthritis of left hip        I reviewed my findings with the patient.  She has reached a point where she would like to proceed with left total hip arthroplasty surgery.  She has exhausted conservative treatment.  She did have a bursal hip injection on 6/6/2024, so we will need to wait at least 3 months following that before proceeding with surgery.  Risks, benefits, alternatives surgery been discussed.  I did provide a limited prescription of tramadol, and if she needs refills she can coordinate that through her primary care  physician preoperatively.    Surgical Counseling     I have informed the patient of the diagnosis and the prognosis.  Exhaustive conservative treatment modalities have not resulted in long term pain relief.  The symptoms have progressed to the point of daily pain and inability to perform activities of daily living without significant pain.  The patient has reached the point of desiring to proceed with total hip arthroplasty after discussing the risks, benefits and alternatives to the procedure.  The surgical procedure itself was discussed in detail.  Risks of the procedure were discussed, which included but are not limited to, bleeding, infection, damage to blood vessels and nerves, incomplete pain relief, loosening of the prosthesis (early or late), deep infection (early or late), need for further surgery, leg length discrepancy, hip dislocation, loss of limb, deep venous thrombosis, pulmonary embolus, death, heart attack, stroke, kidney failure, liver failure, and anesthetic complications.  In addition, the potential for deep infection developing in the future was discussed, which could require further surgery.  The hip would have to be re-opened, debrided, and potentially remove the prosthesis, which may or may not be replaced in the future.  Also, the possibility for loosening of the prosthesis has been mentioned.  If the prosthesis loosened, a revision arthroplasty could be performed, with results that are not as predictable compared to the original procedure.  The typical rehabilitative course has also been discussed, and full recovery may take up to a year to see the maximum benefit.  The importance of patient cooperation in the rehabilitative efforts has also been discussed.  No guarantees were given.  The patient understands the potential risks versus the benefits and desires to proceed with total hip arthroplasty at a mutually convenient time.     Return for surgery.      Tommie Metcalf,  MD  07/01/24  15:28 EDT    Dictated Utilizing Dragon Dictation

## 2024-08-07 ENCOUNTER — PATIENT MESSAGE (OUTPATIENT)
Dept: ORTHOPEDIC SURGERY | Facility: CLINIC | Age: 74
End: 2024-08-07
Payer: MEDICARE

## 2024-08-07 DIAGNOSIS — F32.A DEPRESSION, UNSPECIFIED DEPRESSION TYPE: ICD-10-CM

## 2024-08-07 RX ORDER — FLUOXETINE HYDROCHLORIDE 40 MG/1
40 CAPSULE ORAL DAILY
Qty: 90 CAPSULE | Refills: 0 | Status: SHIPPED | OUTPATIENT
Start: 2024-08-07

## 2024-08-07 NOTE — TELEPHONE ENCOUNTER
Rx Refill Note  Requested Prescriptions     Pending Prescriptions Disp Refills    FLUoxetine (PROzac) 40 MG capsule 30 capsule 5     Sig: Take 1 capsule by mouth Daily.      Last office visit with prescribing clinician: 5/15/2024   Last telemedicine visit with prescribing clinician: Visit date not found   Next office visit with prescribing clinician: Visit date not found                         Would you like a call back once the refill request has been completed: [] Yes [] No    If the office needs to give you a call back, can they leave a voicemail: [] Yes [] No    Hannah Sesay MA  08/07/24, 15:11 EDT

## 2024-08-07 NOTE — TELEPHONE ENCOUNTER
From: Mar Joseph  To: Tommie Metcalf  Sent: 8/7/2024 10:08 AM EDT  Subject: Blood work     When and where do I go for blood work before my surgery?

## 2024-08-09 DIAGNOSIS — M16.12 PRIMARY OSTEOARTHRITIS OF LEFT HIP: Primary | ICD-10-CM

## 2024-08-12 DIAGNOSIS — F32.A DEPRESSION, UNSPECIFIED DEPRESSION TYPE: ICD-10-CM

## 2024-08-12 RX ORDER — FLUOXETINE HYDROCHLORIDE 40 MG/1
40 CAPSULE ORAL DAILY
Qty: 90 CAPSULE | Refills: 0 | Status: CANCELLED | OUTPATIENT
Start: 2024-08-12

## 2024-08-22 ENCOUNTER — TELEPHONE (OUTPATIENT)
Dept: CARDIOLOGY | Facility: CLINIC | Age: 74
End: 2024-08-22
Payer: MEDICARE

## 2024-08-22 ENCOUNTER — PRE-ADMISSION TESTING (OUTPATIENT)
Dept: PREADMISSION TESTING | Facility: HOSPITAL | Age: 74
End: 2024-08-22
Payer: MEDICARE

## 2024-08-22 DIAGNOSIS — M16.12 PRIMARY OSTEOARTHRITIS OF LEFT HIP: ICD-10-CM

## 2024-08-22 LAB
ANION GAP SERPL CALCULATED.3IONS-SCNC: 10 MMOL/L (ref 5–15)
APTT PPP: 33.8 SECONDS (ref 22–39)
BASOPHILS # BLD AUTO: 0.05 10*3/MM3 (ref 0–0.2)
BASOPHILS NFR BLD AUTO: 0.6 % (ref 0–1.5)
BUN SERPL-MCNC: 20 MG/DL (ref 8–23)
BUN/CREAT SERPL: 37.7 (ref 7–25)
CALCIUM SPEC-SCNC: 9.7 MG/DL (ref 8.6–10.5)
CHLORIDE SERPL-SCNC: 105 MMOL/L (ref 98–107)
CO2 SERPL-SCNC: 21 MMOL/L (ref 22–29)
CREAT SERPL-MCNC: 0.53 MG/DL (ref 0.57–1)
CRP SERPL-MCNC: 2.21 MG/DL (ref 0–0.5)
DEPRECATED RDW RBC AUTO: 48.7 FL (ref 37–54)
EGFRCR SERPLBLD CKD-EPI 2021: 98.4 ML/MIN/1.73
EOSINOPHIL # BLD AUTO: 0.06 10*3/MM3 (ref 0–0.4)
EOSINOPHIL NFR BLD AUTO: 0.8 % (ref 0.3–6.2)
ERYTHROCYTE [DISTWIDTH] IN BLOOD BY AUTOMATED COUNT: 13 % (ref 12.3–15.4)
ERYTHROCYTE [SEDIMENTATION RATE] IN BLOOD: 25 MM/HR (ref 0–30)
GLUCOSE SERPL-MCNC: 121 MG/DL (ref 65–99)
HBA1C MFR BLD: 5 % (ref 4.8–5.6)
HCT VFR BLD AUTO: 35.2 % (ref 34–46.6)
HGB BLD-MCNC: 11.5 G/DL (ref 12–15.9)
IMM GRANULOCYTES # BLD AUTO: 0.02 10*3/MM3 (ref 0–0.05)
IMM GRANULOCYTES NFR BLD AUTO: 0.3 % (ref 0–0.5)
INR PPP: 1.23 (ref 0.89–1.12)
LYMPHOCYTES # BLD AUTO: 1.31 10*3/MM3 (ref 0.7–3.1)
LYMPHOCYTES NFR BLD AUTO: 16.5 % (ref 19.6–45.3)
MCH RBC QN AUTO: 33 PG (ref 26.6–33)
MCHC RBC AUTO-ENTMCNC: 32.7 G/DL (ref 31.5–35.7)
MCV RBC AUTO: 100.9 FL (ref 79–97)
MONOCYTES # BLD AUTO: 0.64 10*3/MM3 (ref 0.1–0.9)
MONOCYTES NFR BLD AUTO: 8.1 % (ref 5–12)
NEUTROPHILS NFR BLD AUTO: 5.87 10*3/MM3 (ref 1.7–7)
NEUTROPHILS NFR BLD AUTO: 73.7 % (ref 42.7–76)
NRBC BLD AUTO-RTO: 0 /100 WBC (ref 0–0.2)
PLATELET # BLD AUTO: 321 10*3/MM3 (ref 140–450)
PMV BLD AUTO: 10.1 FL (ref 6–12)
POTASSIUM SERPL-SCNC: 3.9 MMOL/L (ref 3.5–5.2)
PROTHROMBIN TIME: 15.6 SECONDS (ref 12.2–14.5)
RBC # BLD AUTO: 3.49 10*6/MM3 (ref 3.77–5.28)
SODIUM SERPL-SCNC: 136 MMOL/L (ref 136–145)
WBC NRBC COR # BLD AUTO: 7.95 10*3/MM3 (ref 3.4–10.8)

## 2024-08-22 PROCEDURE — 85025 COMPLETE CBC W/AUTO DIFF WBC: CPT

## 2024-08-22 PROCEDURE — 85652 RBC SED RATE AUTOMATED: CPT

## 2024-08-22 PROCEDURE — 85610 PROTHROMBIN TIME: CPT

## 2024-08-22 PROCEDURE — 85730 THROMBOPLASTIN TIME PARTIAL: CPT

## 2024-08-22 PROCEDURE — 86140 C-REACTIVE PROTEIN: CPT

## 2024-08-22 PROCEDURE — 36415 COLL VENOUS BLD VENIPUNCTURE: CPT

## 2024-08-22 PROCEDURE — 83036 HEMOGLOBIN GLYCOSYLATED A1C: CPT

## 2024-08-22 PROCEDURE — 80048 BASIC METABOLIC PNL TOTAL CA: CPT

## 2024-08-22 NOTE — TELEPHONE ENCOUNTER
[facility] in [facility] is requesting a Cardiac Risk Assessment and how long to hold Eliquis prior to surgery. Scheduled to have a Lt Hip replacement on 9/5/24 with . Please advise.

## 2024-08-22 NOTE — PAT
An arrival time for procedure was not provided during PAT visit. If patient had any questions or concerns about their arrival time, they were instructed to contact their surgeon/physician.  Additionally, if the patient referred to an arrival time that was acquired from their my chart account, patient was encouraged to verify that time with their surgeon/physician. Arrival times are NOT provided in Pre Admission Testing Department.    Patient viewed general PAT education video as instructed in their preoperative information received from their surgeon.  Patient stated the general PAT education video was viewed in its entirety and survey completed.  Copies of PAT general education handouts (Incentive Spirometry, Meds to Beds Program, Patient Belongings, Pre-op skin preparation instructions, Blood Glucose testing, Visitor policy, Surgery FAQ, Code H) distributed to patient if not printed. Education related to the PAT pass and skin preparation for surgery (if applicable) completed in PAT as a reinforcement to PAT education video. Patient instructed to return PAT pass provided today as well as completed skin preparation sheet (if applicable) on the day of procedure.     Additionally if patient had not viewed video yet but intended to view it at home or in our waiting area, then referred them to the handout with QR code/link provided during PAT visit.  Encouraged patient/family to read PAT general education handouts thoroughly and notify PAT staff with any questions or concerns. Patient verbalized understanding of all information and priority content.    Prescription for Chlorhexidine shower called into patient's pharmacy or [facility] pharmacy by patient's surgeon.  Reinforced with patient to  the prescription from applicable pharmacy if they haven't already.  Verbal and written instructions given regarding proper use of Chlorhexidine body wash to patient and/or famlily during PAT visit. Patient/family also instructed to  complete checklist and return it to Pre-op on the day of surgery.  Patient and/or family verbalized understanding.    Patient denies any current skin issues.     Patient to apply Chlorhexadine wipes  to surgical area (as instructed) the night before procedure and the AM of procedure. Wipes provided.    Patient instructed to drink 20 ounces of Gatorade or Gatorlyte (if diabetic) and it needs to be completed 1 hour (for Main OR patients) or 2 hours (scheduled  section & [facility] patients) before given arrival time for procedure (NO RED Gatorade and NO Gatorade Zero).    Patient verbalized understanding.    It was noted during Pre Admission Testing that patient was wearing some form of fingernail polish (gel/regular) and/or acrylic/artificial nails.  Patient was told that polish and/or artificial nails must be removed for surgery.  If a patient had recent manicure, and would rather not remove polish or artificial nails. Then the minimum requirement is that the polish/artificial nails must be removed from the middle finger on each hand.  Patient verbalized understanding.    If patient was having surgery on an upper extremity, then the patient was instructed that fingernail polish/artificial fingernails must be removed for surgery.  NO EXCEPTIONS.  Patient verbalized understanding.    If patient was having surgery on a lower extremity, then the patient was instructed that toenail polish on both extremities must be removed for surgery.  NO EXCEPTIONS. Patient verbalized understanding.    Clean catch urinalysis not indicated because patient denied recent urinary frequency, urinary urgency, burning/pain upon urination, or flank pain. No recent UTIs.    Discussed with patient options for receiving total joint replacement education and assessed patient's ability and preference. Joint Replacement Guide given to patient during PAT visit since not received a copy within the last year. Encouraged patient/family to read guide  thoroughly and notify PAT staff with any questions or concerns. Handout provided directing patient to links to watch online videos related to joint replacement surgery on the [facility] website. The handout gives detailed instructions for joining an online joint replacement class through Microsoft Teams or phone conference offered on the 1st and 3rd Thursdays of the month. Patient agreed to participate by watching videos online. Patient verbalized understanding of instructions. Encouraged to share information with family and/or . An overview of the joint replacement education was provided during the visit including general perioperative instructions that are routine for all surgical patients (PAT PASS, wipes, directions to pre-op, etc.).- PER PATIENT AND  ALREADY COMPLETED PRIOR TO PAT VISIT    CARDIAC CLEARANCE AND BLOOD THINNER CLEARANCE REQUESTED.

## 2024-08-22 NOTE — PAT
Verified patient previously completed cardiology visit for cardiac risk assessment in preparation for upcoming procedure, completion of 12-lead ECG within six months, and risk assessment letter reviewed. No further interventions required.     Cleared by Dr Ocasio on 8/22/24.  Chart forwarded to pre op

## 2024-08-30 ENCOUNTER — TELEPHONE (OUTPATIENT)
Dept: ORTHOPEDIC SURGERY | Facility: CLINIC | Age: 74
End: 2024-08-30
Payer: MEDICARE

## 2024-08-30 NOTE — TELEPHONE ENCOUNTER
ATTEMPTED TO CALL PATIENT TO COMPLETE PRE-OP CARE PLAN. RECEIVED NO ANSWER. LEFT VOICEMAIL FOR THE PATIENT TO RETURN MY CALL.

## 2024-09-04 ENCOUNTER — ANESTHESIA EVENT (OUTPATIENT)
Dept: PERIOP | Facility: HOSPITAL | Age: 74
End: 2024-09-04
Payer: MEDICARE

## 2024-09-04 RX ORDER — SODIUM CHLORIDE 9 MG/ML
40 INJECTION, SOLUTION INTRAVENOUS AS NEEDED
Status: CANCELLED | OUTPATIENT
Start: 2024-09-04

## 2024-09-04 RX ORDER — SODIUM CHLORIDE 0.9 % (FLUSH) 0.9 %
10 SYRINGE (ML) INJECTION EVERY 12 HOURS SCHEDULED
Status: CANCELLED | OUTPATIENT
Start: 2024-09-04

## 2024-09-04 RX ORDER — SODIUM CHLORIDE 0.9 % (FLUSH) 0.9 %
10 SYRINGE (ML) INJECTION AS NEEDED
Status: CANCELLED | OUTPATIENT
Start: 2024-09-04

## 2024-09-04 RX ORDER — FAMOTIDINE 10 MG/ML
20 INJECTION, SOLUTION INTRAVENOUS ONCE
Status: CANCELLED | OUTPATIENT
Start: 2024-09-04 | End: 2024-09-04

## 2024-09-05 ENCOUNTER — APPOINTMENT (OUTPATIENT)
Dept: GENERAL RADIOLOGY | Facility: HOSPITAL | Age: 74
End: 2024-09-05
Payer: MEDICARE

## 2024-09-05 ENCOUNTER — HOSPITAL ENCOUNTER (OUTPATIENT)
Facility: HOSPITAL | Age: 74
Discharge: HOME | End: 2024-09-06
Attending: ORTHOPAEDIC SURGERY | Admitting: ORTHOPAEDIC SURGERY
Payer: MEDICARE

## 2024-09-05 ENCOUNTER — ANESTHESIA (OUTPATIENT)
Dept: PERIOP | Facility: HOSPITAL | Age: 74
End: 2024-09-05
Payer: MEDICARE

## 2024-09-05 DIAGNOSIS — M16.12 PRIMARY OSTEOARTHRITIS OF LEFT HIP: ICD-10-CM

## 2024-09-05 DIAGNOSIS — Z96.642 STATUS POST TOTAL REPLACEMENT OF LEFT HIP: Primary | ICD-10-CM

## 2024-09-05 PROCEDURE — 97162 PT EVAL MOD COMPLEX 30 MIN: CPT

## 2024-09-05 PROCEDURE — C1776 JOINT DEVICE (IMPLANTABLE): HCPCS | Performed by: ORTHOPAEDIC SURGERY

## 2024-09-05 PROCEDURE — 25010000002 DEXAMETHASONE PER 1 MG: Performed by: NURSE ANESTHETIST, CERTIFIED REGISTERED

## 2024-09-05 PROCEDURE — 25810000003 LACTATED RINGERS PER 1000 ML: Performed by: ANESTHESIOLOGY

## 2024-09-05 PROCEDURE — 27130 TOTAL HIP ARTHROPLASTY: CPT | Performed by: ORTHOPAEDIC SURGERY

## 2024-09-05 PROCEDURE — 25010000002 PHENYLEPHRINE 10 MG/ML SOLUTION 1 ML VIAL: Performed by: NURSE ANESTHETIST, CERTIFIED REGISTERED

## 2024-09-05 PROCEDURE — 25010000002 FENTANYL CITRATE (PF) 100 MCG/2ML SOLUTION: Performed by: NURSE ANESTHETIST, CERTIFIED REGISTERED

## 2024-09-05 PROCEDURE — 73502 X-RAY EXAM HIP UNI 2-3 VIEWS: CPT

## 2024-09-05 PROCEDURE — 76000 FLUOROSCOPY <1 HR PHYS/QHP: CPT

## 2024-09-05 PROCEDURE — 27130 TOTAL HIP ARTHROPLASTY: CPT | Performed by: PHYSICIAN ASSISTANT

## 2024-09-05 PROCEDURE — 25810000003 SODIUM CHLORIDE 0.9 % SOLUTION: Performed by: ORTHOPAEDIC SURGERY

## 2024-09-05 PROCEDURE — 25010000002 CEFAZOLIN PER 500 MG: Performed by: ORTHOPAEDIC SURGERY

## 2024-09-05 PROCEDURE — C1713 ANCHOR/SCREW BN/BN,TIS/BN: HCPCS | Performed by: ORTHOPAEDIC SURGERY

## 2024-09-05 PROCEDURE — 25010000002 ONDANSETRON PER 1 MG: Performed by: NURSE ANESTHETIST, CERTIFIED REGISTERED

## 2024-09-05 PROCEDURE — 25010000002 PROPOFOL 10 MG/ML EMULSION: Performed by: NURSE ANESTHETIST, CERTIFIED REGISTERED

## 2024-09-05 PROCEDURE — 25010000002 BUPIVACAINE 0.5 % SOLUTION: Performed by: ANESTHESIOLOGY

## 2024-09-05 PROCEDURE — C1755 CATHETER, INTRASPINAL: HCPCS | Performed by: ORTHOPAEDIC SURGERY

## 2024-09-05 PROCEDURE — 25010000002 ROPIVACAINE PER 1 MG: Performed by: ORTHOPAEDIC SURGERY

## 2024-09-05 DEVICE — DEV CONTRL TISS STRATAFIX SYMM PDS PLUS VIL CT-1 45CM: Type: IMPLANTABLE DEVICE | Site: HIP | Status: FUNCTIONAL

## 2024-09-05 DEVICE — REFLECTION SPHERICAL HEAD SCREW 30MM
Type: IMPLANTABLE DEVICE | Site: HIP | Status: FUNCTIONAL
Brand: REFLECTION

## 2024-09-05 DEVICE — OXINIUM FEMORAL HEAD 12/14 TAPER                                    36 MM -3
Type: IMPLANTABLE DEVICE | Site: HIP | Status: FUNCTIONAL
Brand: OXINIUM

## 2024-09-05 DEVICE — R3 0 DEGREE XLPE ACETABULAR LINER                                    36MM INNER DIAMETER X OUTER DIAMETER 52MM
Type: IMPLANTABLE DEVICE | Site: HIP | Status: FUNCTIONAL
Brand: R3

## 2024-09-05 DEVICE — IMPLANTABLE DEVICE: Type: IMPLANTABLE DEVICE | Site: HIP | Status: FUNCTIONAL

## 2024-09-05 DEVICE — R3 3 HOLE ACETABULAR SHELL 52MM
Type: IMPLANTABLE DEVICE | Site: HIP | Status: FUNCTIONAL
Brand: R3 ACETABULAR

## 2024-09-05 DEVICE — POLARSTEM COLLAR STANDARD                                    NON-CEMENTED WITH TI/HA 2
Type: IMPLANTABLE DEVICE | Site: HIP | Status: FUNCTIONAL
Brand: POLARSTEM

## 2024-09-05 DEVICE — DEV CONTRL TISS STRATAFIX SPIRAL MNCRYL UD 3/0 PLS 60CM: Type: IMPLANTABLE DEVICE | Site: HIP | Status: FUNCTIONAL

## 2024-09-05 RX ORDER — ACETAMINOPHEN 500 MG
1000 TABLET ORAL ONCE
Status: COMPLETED | OUTPATIENT
Start: 2024-09-05 | End: 2024-09-05

## 2024-09-05 RX ORDER — TRANEXAMIC ACID 10 MG/ML
1000 INJECTION, SOLUTION INTRAVENOUS ONCE
Status: COMPLETED | OUTPATIENT
Start: 2024-09-05 | End: 2024-09-05

## 2024-09-05 RX ORDER — LATANOPROST 50 UG/ML
1 SOLUTION/ DROPS OPHTHALMIC NIGHTLY
Status: DISCONTINUED | OUTPATIENT
Start: 2024-09-05 | End: 2024-09-06 | Stop reason: HOSPADM

## 2024-09-05 RX ORDER — ONDANSETRON 4 MG/1
4 TABLET, ORALLY DISINTEGRATING ORAL EVERY 6 HOURS PRN
Status: DISCONTINUED | OUTPATIENT
Start: 2024-09-05 | End: 2024-09-06 | Stop reason: HOSPADM

## 2024-09-05 RX ORDER — NALOXONE HCL 0.4 MG/ML
0.1 VIAL (ML) INJECTION
Status: DISCONTINUED | OUTPATIENT
Start: 2024-09-05 | End: 2024-09-06 | Stop reason: HOSPADM

## 2024-09-05 RX ORDER — SODIUM CHLORIDE 0.9 % (FLUSH) 0.9 %
10 SYRINGE (ML) INJECTION EVERY 12 HOURS SCHEDULED
Status: DISCONTINUED | OUTPATIENT
Start: 2024-09-05 | End: 2024-09-06 | Stop reason: HOSPADM

## 2024-09-05 RX ORDER — FENTANYL CITRATE 50 UG/ML
50 INJECTION, SOLUTION INTRAMUSCULAR; INTRAVENOUS
Status: DISCONTINUED | OUTPATIENT
Start: 2024-09-05 | End: 2024-09-05 | Stop reason: SDUPTHER

## 2024-09-05 RX ORDER — ONDANSETRON 2 MG/ML
INJECTION INTRAMUSCULAR; INTRAVENOUS AS NEEDED
Status: DISCONTINUED | OUTPATIENT
Start: 2024-09-05 | End: 2024-09-05 | Stop reason: SURG

## 2024-09-05 RX ORDER — SODIUM CHLORIDE 0.9 % (FLUSH) 0.9 %
1-10 SYRINGE (ML) INJECTION AS NEEDED
Status: DISCONTINUED | OUTPATIENT
Start: 2024-09-05 | End: 2024-09-06 | Stop reason: HOSPADM

## 2024-09-05 RX ORDER — HYDROMORPHONE HYDROCHLORIDE 1 MG/ML
0.5 INJECTION, SOLUTION INTRAMUSCULAR; INTRAVENOUS; SUBCUTANEOUS
Status: DISCONTINUED | OUTPATIENT
Start: 2024-09-05 | End: 2024-09-05 | Stop reason: SDUPTHER

## 2024-09-05 RX ORDER — DROPERIDOL 2.5 MG/ML
0.62 INJECTION, SOLUTION INTRAMUSCULAR; INTRAVENOUS ONCE AS NEEDED
Status: DISCONTINUED | OUTPATIENT
Start: 2024-09-05 | End: 2024-09-05 | Stop reason: HOSPADM

## 2024-09-05 RX ORDER — IPRATROPIUM BROMIDE AND ALBUTEROL SULFATE 2.5; .5 MG/3ML; MG/3ML
3 SOLUTION RESPIRATORY (INHALATION) ONCE AS NEEDED
Status: DISCONTINUED | OUTPATIENT
Start: 2024-09-05 | End: 2024-09-05 | Stop reason: HOSPADM

## 2024-09-05 RX ORDER — SOTALOL HYDROCHLORIDE 80 MG/1
80 TABLET ORAL 2 TIMES DAILY
Status: DISCONTINUED | OUTPATIENT
Start: 2024-09-05 | End: 2024-09-06 | Stop reason: HOSPADM

## 2024-09-05 RX ORDER — LIDOCAINE HYDROCHLORIDE 10 MG/ML
INJECTION, SOLUTION EPIDURAL; INFILTRATION; INTRACAUDAL; PERINEURAL AS NEEDED
Status: DISCONTINUED | OUTPATIENT
Start: 2024-09-05 | End: 2024-09-05 | Stop reason: SURG

## 2024-09-05 RX ORDER — BUPIVACAINE HCL/0.9 % NACL/PF 0.125 %
PLASTIC BAG, INJECTION (ML) EPIDURAL AS NEEDED
Status: DISCONTINUED | OUTPATIENT
Start: 2024-09-05 | End: 2024-09-05 | Stop reason: SURG

## 2024-09-05 RX ORDER — MIDAZOLAM HYDROCHLORIDE 1 MG/ML
0.5 INJECTION INTRAMUSCULAR; INTRAVENOUS
Status: DISCONTINUED | OUTPATIENT
Start: 2024-09-05 | End: 2024-09-05 | Stop reason: HOSPADM

## 2024-09-05 RX ORDER — PROPOFOL 10 MG/ML
VIAL (ML) INTRAVENOUS AS NEEDED
Status: DISCONTINUED | OUTPATIENT
Start: 2024-09-05 | End: 2024-09-05 | Stop reason: SURG

## 2024-09-05 RX ORDER — MAGNESIUM HYDROXIDE 1200 MG/15ML
LIQUID ORAL AS NEEDED
Status: DISCONTINUED | OUTPATIENT
Start: 2024-09-05 | End: 2024-09-05 | Stop reason: HOSPADM

## 2024-09-05 RX ORDER — LIDOCAINE HYDROCHLORIDE 10 MG/ML
0.5 INJECTION, SOLUTION EPIDURAL; INFILTRATION; INTRACAUDAL; PERINEURAL ONCE AS NEEDED
Status: COMPLETED | OUTPATIENT
Start: 2024-09-05 | End: 2024-09-05

## 2024-09-05 RX ORDER — HYDROMORPHONE HYDROCHLORIDE 1 MG/ML
0.5 INJECTION, SOLUTION INTRAMUSCULAR; INTRAVENOUS; SUBCUTANEOUS
Status: DISCONTINUED | OUTPATIENT
Start: 2024-09-05 | End: 2024-09-05 | Stop reason: HOSPADM

## 2024-09-05 RX ORDER — SODIUM CHLORIDE, SODIUM LACTATE, POTASSIUM CHLORIDE, CALCIUM CHLORIDE 600; 310; 30; 20 MG/100ML; MG/100ML; MG/100ML; MG/100ML
9 INJECTION, SOLUTION INTRAVENOUS CONTINUOUS
Status: DISCONTINUED | OUTPATIENT
Start: 2024-09-05 | End: 2024-09-05

## 2024-09-05 RX ORDER — FAMOTIDINE 20 MG/1
20 TABLET, FILM COATED ORAL ONCE
Status: COMPLETED | OUTPATIENT
Start: 2024-09-05 | End: 2024-09-05

## 2024-09-05 RX ORDER — LABETALOL HYDROCHLORIDE 5 MG/ML
10 INJECTION, SOLUTION INTRAVENOUS EVERY 4 HOURS PRN
Status: DISCONTINUED | OUTPATIENT
Start: 2024-09-05 | End: 2024-09-06 | Stop reason: HOSPADM

## 2024-09-05 RX ORDER — OXYCODONE HYDROCHLORIDE 5 MG/1
5 TABLET ORAL EVERY 4 HOURS PRN
Status: DISCONTINUED | OUTPATIENT
Start: 2024-09-05 | End: 2024-09-06 | Stop reason: HOSPADM

## 2024-09-05 RX ORDER — FENTANYL CITRATE 50 UG/ML
50 INJECTION, SOLUTION INTRAMUSCULAR; INTRAVENOUS
Status: DISCONTINUED | OUTPATIENT
Start: 2024-09-05 | End: 2024-09-05 | Stop reason: HOSPADM

## 2024-09-05 RX ORDER — ACETAMINOPHEN 500 MG
1000 TABLET ORAL EVERY 8 HOURS
Status: DISCONTINUED | OUTPATIENT
Start: 2024-09-05 | End: 2024-09-06 | Stop reason: HOSPADM

## 2024-09-05 RX ORDER — SODIUM CHLORIDE 9 MG/ML
120 INJECTION, SOLUTION INTRAVENOUS CONTINUOUS
Status: DISCONTINUED | OUTPATIENT
Start: 2024-09-05 | End: 2024-09-06 | Stop reason: HOSPADM

## 2024-09-05 RX ORDER — BUPIVACAINE HYDROCHLORIDE 5 MG/ML
INJECTION, SOLUTION PERINEURAL
Status: COMPLETED | OUTPATIENT
Start: 2024-09-05 | End: 2024-09-05

## 2024-09-05 RX ORDER — ROPIVACAINE HYDROCHLORIDE 5 MG/ML
INJECTION, SOLUTION EPIDURAL; INFILTRATION; PERINEURAL AS NEEDED
Status: DISCONTINUED | OUTPATIENT
Start: 2024-09-05 | End: 2024-09-05 | Stop reason: HOSPADM

## 2024-09-05 RX ORDER — FENTANYL CITRATE 50 UG/ML
INJECTION, SOLUTION INTRAMUSCULAR; INTRAVENOUS AS NEEDED
Status: DISCONTINUED | OUTPATIENT
Start: 2024-09-05 | End: 2024-09-05 | Stop reason: SURG

## 2024-09-05 RX ORDER — PREGABALIN 150 MG/1
150 CAPSULE ORAL ONCE
Status: COMPLETED | OUTPATIENT
Start: 2024-09-05 | End: 2024-09-05

## 2024-09-05 RX ORDER — DROPERIDOL 2.5 MG/ML
0.62 INJECTION, SOLUTION INTRAMUSCULAR; INTRAVENOUS ONCE AS NEEDED
Status: DISCONTINUED | OUTPATIENT
Start: 2024-09-05 | End: 2024-09-05 | Stop reason: SDUPTHER

## 2024-09-05 RX ORDER — ONDANSETRON 2 MG/ML
4 INJECTION INTRAMUSCULAR; INTRAVENOUS EVERY 6 HOURS PRN
Status: DISCONTINUED | OUTPATIENT
Start: 2024-09-05 | End: 2024-09-06 | Stop reason: HOSPADM

## 2024-09-05 RX ORDER — NALOXONE HCL 0.4 MG/ML
0.4 VIAL (ML) INJECTION
Status: DISCONTINUED | OUTPATIENT
Start: 2024-09-05 | End: 2024-09-06 | Stop reason: HOSPADM

## 2024-09-05 RX ORDER — TRANEXAMIC ACID 10 MG/ML
1000 INJECTION, SOLUTION INTRAVENOUS ONCE
Status: DISCONTINUED | OUTPATIENT
Start: 2024-09-05 | End: 2024-09-05 | Stop reason: HOSPADM

## 2024-09-05 RX ORDER — SODIUM CHLORIDE 9 MG/ML
40 INJECTION, SOLUTION INTRAVENOUS AS NEEDED
Status: DISCONTINUED | OUTPATIENT
Start: 2024-09-05 | End: 2024-09-06 | Stop reason: HOSPADM

## 2024-09-05 RX ORDER — DEXAMETHASONE SODIUM PHOSPHATE 4 MG/ML
INJECTION, SOLUTION INTRA-ARTICULAR; INTRALESIONAL; INTRAMUSCULAR; INTRAVENOUS; SOFT TISSUE AS NEEDED
Status: DISCONTINUED | OUTPATIENT
Start: 2024-09-05 | End: 2024-09-05 | Stop reason: SURG

## 2024-09-05 RX ORDER — HYDROMORPHONE HYDROCHLORIDE 1 MG/ML
0.5 INJECTION, SOLUTION INTRAMUSCULAR; INTRAVENOUS; SUBCUTANEOUS
Status: DISCONTINUED | OUTPATIENT
Start: 2024-09-05 | End: 2024-09-06 | Stop reason: HOSPADM

## 2024-09-05 RX ADMIN — SODIUM CHLORIDE 2000 MG: 900 INJECTION INTRAVENOUS at 20:06

## 2024-09-05 RX ADMIN — SODIUM CHLORIDE, POTASSIUM CHLORIDE, SODIUM LACTATE AND CALCIUM CHLORIDE 9 ML/HR: 600; 310; 30; 20 INJECTION, SOLUTION INTRAVENOUS at 11:15

## 2024-09-05 RX ADMIN — PREGABALIN 150 MG: 150 CAPSULE ORAL at 11:15

## 2024-09-05 RX ADMIN — LIDOCAINE HYDROCHLORIDE 50 MG: 10 INJECTION, SOLUTION EPIDURAL; INFILTRATION; INTRACAUDAL; PERINEURAL at 13:06

## 2024-09-05 RX ADMIN — Medication 100 MCG: at 13:39

## 2024-09-05 RX ADMIN — SODIUM CHLORIDE 2 G: 900 INJECTION INTRAVENOUS at 13:13

## 2024-09-05 RX ADMIN — LIDOCAINE HYDROCHLORIDE 0.5 ML: 10 INJECTION, SOLUTION EPIDURAL; INFILTRATION; INTRACAUDAL; PERINEURAL at 11:15

## 2024-09-05 RX ADMIN — OXYCODONE HYDROCHLORIDE 5 MG: 5 TABLET ORAL at 20:06

## 2024-09-05 RX ADMIN — PROPOFOL 50 MG: 10 INJECTION, EMULSION INTRAVENOUS at 13:06

## 2024-09-05 RX ADMIN — SODIUM CHLORIDE, POTASSIUM CHLORIDE, SODIUM LACTATE AND CALCIUM CHLORIDE: 600; 310; 30; 20 INJECTION, SOLUTION INTRAVENOUS at 14:08

## 2024-09-05 RX ADMIN — BUPIVACAINE HYDROCHLORIDE 1.6 ML: 5 INJECTION, SOLUTION PERINEURAL at 13:15

## 2024-09-05 RX ADMIN — PHENYLEPHRINE HYDROCHLORIDE 0.1 MCG/KG/MIN: 10 INJECTION INTRAVENOUS at 13:40

## 2024-09-05 RX ADMIN — PROPOFOL 100 MCG/KG/MIN: 10 INJECTION, EMULSION INTRAVENOUS at 13:17

## 2024-09-05 RX ADMIN — FENTANYL CITRATE 100 MCG: 50 INJECTION, SOLUTION INTRAMUSCULAR; INTRAVENOUS at 13:32

## 2024-09-05 RX ADMIN — ACETAMINOPHEN 1000 MG: 500 TABLET ORAL at 20:06

## 2024-09-05 RX ADMIN — SOTALOL HYDROCHLORIDE 80 MG: 80 TABLET ORAL at 20:06

## 2024-09-05 RX ADMIN — ONDANSETRON 4 MG: 2 INJECTION INTRAMUSCULAR; INTRAVENOUS at 13:39

## 2024-09-05 RX ADMIN — DEXAMETHASONE SODIUM PHOSPHATE 4 MG: 4 INJECTION INTRA-ARTICULAR; INTRALESIONAL; INTRAMUSCULAR; INTRAVENOUS; SOFT TISSUE at 13:39

## 2024-09-05 RX ADMIN — FAMOTIDINE 20 MG: 20 TABLET, FILM COATED ORAL at 11:15

## 2024-09-05 RX ADMIN — SODIUM CHLORIDE 120 ML/HR: 9 INJECTION, SOLUTION INTRAVENOUS at 16:54

## 2024-09-05 RX ADMIN — LATANOPROST 1 DROP: 50 SOLUTION OPHTHALMIC at 20:06

## 2024-09-05 RX ADMIN — TRANEXAMIC ACID 1000 MG: 10 INJECTION, SOLUTION INTRAVENOUS at 14:36

## 2024-09-05 RX ADMIN — ACETAMINOPHEN 1000 MG: 500 TABLET ORAL at 11:15

## 2024-09-05 RX ADMIN — TRANEXAMIC ACID 1000 MG: 10 INJECTION, SOLUTION INTRAVENOUS at 13:22

## 2024-09-05 NOTE — H&P
Pre-Op H&P  Mar Joseph  [id number]  1950      Chief complaint: Left hip pain      Subjective:  Patient is a 73 y.o.female presents for scheduled surgery by Dr. Metcalf. She anticipates a TOTAL HIP ARTHROPLASTY ANTERIOR MODIFIED- left today. She reports severe left hip pain for the last 4 months. She uses a cane or walker for ambulation. Current symptoms: pain, popping, stiffness, and giving way/buckling. The pain is worse with any movement of the joint; lying down improve the pain. She tried cortisone injection without benefit.       Review of Systems:  Constitutional-- No fever, chills or sweats. No fatigue.  CV-- No chest pain, palpitation or syncope. +HLD, afib  +cardiac clearance  Resp-- No SOB, cough, hemoptysis  Skin--No rashes or lesions      Allergies:   Allergies   Allergen Reactions    Ibuprofen GI Bleeding     Patient does not take due to being on blood thinners    Moxifloxacin Unknown (See Comments)    Penicillins Hives    Sulfa Antibiotics Nausea Only     nausea         Home Meds:  Medications Prior to Admission   Medication Sig Dispense Refill Last Dose    acetaminophen (TYLENOL) 500 MG tablet Take 1 tablet by mouth Every 6 (Six) Hours As Needed for Mild Pain.   9/5/2024 at 0830    Chlorhexidine Gluconate 4 % solution Shower daily with solution as directed for 5 days prior to surgery. 237 mL 0 9/4/2024    FLUoxetine (PROzac) 40 MG capsule Take 1 capsule by mouth Daily. 90 capsule 0 9/5/2024 at 0830    multivitamin with minerals (MULTIVITAMIN ADULTS 50+ PO) Take 1 tablet by mouth 2 (Two) Times a Day.   9/4/2024    sotalol (Betapace) 80 MG tablet Take 1 tablet by mouth 2 (Two) Times a Day. 180 tablet 3 9/5/2024 at 0830    traMADol (ULTRAM) 50 MG tablet Take 1 tablet by mouth 4 (Four) Times a Day.   9/4/2024    traMADol (ULTRAM) 50 MG tablet Take 1 tablet by mouth Every 8 (Eight) Hours As Needed for Moderate Pain. 30 tablet 0 9/4/2024    travoprost, BAK free, (TRAVATAN) 0.004 % solution  ophthalmic solution travoprost 0.004 % eye drops   Instill by ophthalmic route.   9/4/2024    albuterol sulfate  (90 Base) MCG/ACT inhaler Inhale 2 puffs 2 (Two) Times a Day. 8 g 1 More than a month    apixaban (Eliquis) 5 MG tablet tablet Take 1 tablet by mouth 2 (Two) Times a Day. 180 tablet 3 8/31/2024    gabapentin (NEURONTIN) 400 MG capsule Take  by mouth 2 (Two) Times a Day.   More than a month         PMH:   Past Medical History:   Diagnosis Date    A-fib     Abnormal ECG     Allergic 02962    Arrhythmia     Arthritis     Arthritis of back     Back problem     Bronchitis     Cervical disc disorder     Chronic alcohol use     Chronic colitis     Fracture of hip     Fracture, femur     Glaucoma Years ago    Heart disease     Hip arthrosis     History of cardioversion     History of transesophageal echocardiography (YOLANDA)     Inflammatory bowel disease Years ago    Low back pain Years ago    Low back strain     Lumbosacral disc disease     Menopause     Mixed hyperlipidemia 10/11/2019    Palpitation     Benign palpitations    Scoliosis     Tibia/fibula fracture     Fall with right tibia/fibula fracture, status post tibia IM harish, September 2013.    Tobacco use     Visual impairment Years ago    Wears glasses      PSH:    Past Surgical History:   Procedure Laterality Date    CARDIAC ELECTROPHYSIOLOGY PROCEDURE N/A 06/26/2020    Procedure: PVA (paroxysmal), hold Sotalol 3 days, DNS Eliquis, Rhythmia (BSC) only using direct sense;  Surgeon: Isiah Garcia DO;  Location: [facility] INVASIVE LOCATION;  Service: Cardiovascular;  Laterality: N/A;    CARDIOVERSION      COLONOSCOPY  2015    HAND SURGERY      LEG SURGERY Right     states five breaks, steal harish in R leg    TRIGGER POINT INJECTION         Immunization History:  Influenza: UTD  Pneumococcal: UTD  Tetanus: No    Social History:   Tobacco:   Social History     Tobacco Use   Smoking Status Former    Current packs/day: 0.25    Average packs/day: 0.3  packs/day for 67.8 years (16.9 ttl pk-yrs)    Types: Cigarettes    Start date: 1/1/1972    Passive exposure: Never   Smokeless Tobacco Never      Alcohol:     Social History     Substance and Sexual Activity   Alcohol Use Yes    Alcohol/week: 2.0 standard drinks of alcohol    Types: 1 Glasses of wine, 1 Cans of beer per week    Comment: WEEKLY         Physical Exam:/61   Pulse 62   Temp 98 °F (36.7 °C) (Temporal)   Resp 16   LMP  (LMP Unknown)       General Appearance:    Alert, cooperative, no distress, appears stated age   Head:    Normocephalic, without obvious abnormality, atraumatic   Lungs:     Clear to auscultation bilaterally, respirations unlabored    Heart:   Regular rate and rhythm, S1 and S2 normal    Abdomen:    Soft without tenderness   Extremities:   Extremities normal, atraumatic, no cyanosis or edema   Skin:   Skin color, texture, turgor normal, no rashes or lesions   Neurologic:   Grossly intact     Results Review:     LABS:  Lab Results   Component Value Date    WBC 7.95 08/22/2024    HGB 11.5 (L) 08/22/2024    HCT 35.2 08/22/2024    .9 (H) 08/22/2024     08/22/2024    NEUTROABS 5.87 08/22/2024    GLUCOSE 121 (H) 08/22/2024    BUN 20 08/22/2024    CREATININE 0.53 (L) 08/22/2024    EGFRIFNONA 125 07/28/2021    EGFRIFAFRI 119 07/21/2020     08/22/2024    K 3.9 08/22/2024     08/22/2024    CO2 21.0 (L) 08/22/2024    MG 2.1 05/15/2024    CALCIUM 9.7 08/22/2024    ALBUMIN 4.2 05/16/2024    AST 21 05/16/2024    ALT 23 05/16/2024    BILITOT 0.4 05/16/2024       RADIOLOGY:  Imaging Results (Last 72 Hours)       ** No results found for the last 72 hours. **            I reviewed the patient's new clinical results.    Cancer Staging (if applicable)  Cancer Patient: __ yes __no __unknown; If yes, clinical stage T:__ N:__M:__, stage group or __N/A      Impression: Left hip pain      Plan: TOTAL HIP ARTHROPLASTY ANTERIOR MODIFIED - left      Nathalia Hutton, ALDO   9/5/2024    12:03 EDT    Agree with above.  Plan for left total hip arthroplasty.    Tommie Metcalf MD  09/05/24  12:11 EDT

## 2024-09-05 NOTE — H&P
Patient Name: Mar Joseph  MRN: [id number]  : 1950  DOS: 2024    Attending: Tommie Metcalf MD    Primary Care Provider: Anand Pineda DO      Chief complaint: Left hip pain    Subjective   Patient is a pleasant 73 y.o. female presented for scheduled surgery by Dr. Metcalf.    Patient has had left hip pain for the last 4 months due to hip arthritis that has been quite severe.  She has been using a cane or a walker for ambulation.  She has failed conservative management and opted to proceed with surgery.    I saw her preoperatively, reviewed with patient and her  past medical history and home medications.  She is on anticoagulation due to history of paroxysmal A-fib.  This is on hold for surgery.    Subsequent notes indicate she later underwent left total hip arthroplasty, modified anterior, under spinal anesthesia, tolerated surgery well, was admitted for further management.    Allergies   Allergen Reactions    Ibuprofen GI Bleeding     Patient does not take due to being on blood thinners    Moxifloxacin Unknown (See Comments)    Penicillins Hives    Sulfa Antibiotics Nausea Only     nausea        Medications Prior to Admission   Medication Sig Dispense Refill Last Dose    acetaminophen (TYLENOL) 500 MG tablet Take 1 tablet by mouth Every 6 (Six) Hours As Needed for Mild Pain.   2024 at 0830    Chlorhexidine Gluconate 4 % solution Shower daily with solution as directed for 5 days prior to surgery. 237 mL 0 2024    FLUoxetine (PROzac) 40 MG capsule Take 1 capsule by mouth Daily. 90 capsule 0 2024 at 0830    multivitamin with minerals (MULTIVITAMIN ADULTS 50+ PO) Take 1 tablet by mouth 2 (Two) Times a Day.   2024    sotalol (Betapace) 80 MG tablet Take 1 tablet by mouth 2 (Two) Times a Day. 180 tablet 3 2024 at 0830    traMADol (ULTRAM) 50 MG tablet Take 1 tablet by mouth 4 (Four) Times a Day.   2024    traMADol (ULTRAM) 50 MG tablet Take 1 tablet by mouth Every 8  (Eight) Hours As Needed for Moderate Pain. 30 tablet 0 9/4/2024    travoprost, BAK free, (TRAVATAN) 0.004 % solution ophthalmic solution travoprost 0.004 % eye drops   Instill by ophthalmic route.   9/4/2024    albuterol sulfate  (90 Base) MCG/ACT inhaler Inhale 2 puffs 2 (Two) Times a Day. 8 g 1 More than a month    apixaban (Eliquis) 5 MG tablet tablet Take 1 tablet by mouth 2 (Two) Times a Day. 180 tablet 3 8/31/2024    gabapentin (NEURONTIN) 400 MG capsule Take  by mouth 2 (Two) Times a Day.   More than a month          Past Medical History:   Diagnosis Date    A-fib     Abnormal ECG     Allergic 84528    Arrhythmia     Arthritis     Arthritis of back     Back problem     Bronchitis     Cervical disc disorder     Chronic alcohol use     Chronic colitis     Fracture of hip     Fracture, femur     Glaucoma Years ago    Heart disease     Hip arthrosis     History of cardioversion     History of transesophageal echocardiography (YOLANDA)     Inflammatory bowel disease Years ago    Low back pain Years ago    Low back strain     Lumbosacral disc disease     Menopause     Mixed hyperlipidemia 10/11/2019    Palpitation     Benign palpitations    Scoliosis     Tibia/fibula fracture     Fall with right tibia/fibula fracture, status post tibia IM harish, September 2013.    Tobacco use     Visual impairment Years ago    Wears glasses      Past Surgical History:   Procedure Laterality Date    CARDIAC ELECTROPHYSIOLOGY PROCEDURE N/A 06/26/2020    Procedure: PVA (paroxysmal), hold Sotalol 3 days, DNS Eliquis, Rhythmia (BSC) only using direct sense;  Surgeon: Isiah Garcia DO;  Location: [facility] INVASIVE LOCATION;  Service: Cardiovascular;  Laterality: N/A;    CARDIOVERSION      COLONOSCOPY  2015    HAND SURGERY      LEG SURGERY Right     states five breaks, steal harish in R leg    TRIGGER POINT INJECTION       Family History   Problem Relation Age of Onset    Asthma Mother     No Known Problems Father     Heart disease  Sister     Cancer Brother     No Known Problems Maternal Grandmother     No Known Problems Maternal Grandfather     No Known Problems Paternal Grandmother     No Known Problems Paternal Grandfather     No Known Problems Brother     Heart disease Other     Heart attack Other     Diabetes Other      Social History     Tobacco Use    Smoking status: Former     Current packs/day: 0.25     Average packs/day: 0.3 packs/day for 67.8 years (16.9 ttl pk-yrs)     Types: Cigarettes     Start date: 1/1/1972     Passive exposure: Never    Smokeless tobacco: Never   Vaping Use    Vaping status: Never Used   Substance Use Topics    Alcohol use: Yes     Alcohol/week: 2.0 standard drinks of alcohol     Types: 1 Glasses of wine, 1 Cans of beer per week     Comment: WEEKLY    Drug use: Never       Review of Systems  Pertinent items are noted in HPI    Vital Signs  /75 (BP Location: Right arm, Patient Position: Lying)   Pulse 72   Temp 96.5 °F (35.8 °C) (Tympanic)   Resp 14   LMP  (LMP Unknown)   SpO2 96%     Physical Exam:    General Appearance:    Alert, cooperative, in no acute distress   Head:    Normocephalic, without obvious abnormality, atraumatic   Eyes:            Lids and lashes normal, conjunctivae and sclerae normal, no   icterus, no pallor, corneas clear    Ears:    Ears appear intact with no abnormalities noted   Throat:   No oral lesions, no thrush, oral mucosa moist   Neck:   No adenopathy, supple, trachea midline, no thyromegaly         Lungs:     Clear to auscultation,respirations regular, even and   unlabored. No wheezes or rales.    Heart:    Regular rhythm and normal rate, normal S1 and S2, no murmur, no gallop   Abdomen:     Normal bowel sounds, no masses, no organomegaly, soft        non-tender, non-distended, no guarding, no rebound                 tenderness   Genitalia:    Deferred   Extremities: No clubbing, cyanosis, or edema.   Pulses:   Pulses palpable and equal bilaterally   Skin:   No  "bleeding, bruising or rash   Neurologic:   Cranial nerves 2 - 12 grossly intact, intact flexion and dorsiflexion bilateral feet      I reviewed the patient's new clinical results.             Invalid input(s): \"NEUTOPHILPCT\"        Invalid input(s): \"LABALBU\", \"PROT\"  Lab Results   Component Value Date    HGBA1C 5.00 08/22/2024      Latest Reference Range & Units 08/22/24 13:29   Sodium 136 - 145 mmol/L 136   Potassium 3.5 - 5.2 mmol/L 3.9   Chloride 98 - 107 mmol/L 105   CO2 22.0 - 29.0 mmol/L 21.0 (L)   Anion Gap 5.0 - 15.0 mmol/L 10.0   BUN 8 - 23 mg/dL 20   Creatinine 0.57 - 1.00 mg/dL 0.53 (L)   BUN/Creatinine Ratio 7.0 - 25.0  37.7 (H)   eGFR >60.0 mL/min/1.73 98.4   Glucose 65 - 99 mg/dL 121 (H)   Calcium 8.6 - 10.5 mg/dL 9.7   (L): Data is abnormally low  (H): Data is abnormally high        Assessment and Plan:       Status post total replacement of left hip    Paroxysmal atrial fibrillation    Anxiety    Chronic anticoagulation    Degenerative arthritis of hip      Plan:  Postop management to include  1. PT/OT,  Weight bearing as tolerated left LE.  Total hip precautions  2. Pain control-prns  3. IS-encourage  4. DVT proph- Mechanicals and Eliquis starting at 2.5 mg twice daily on postop day 1 for 4 days before resuming home dose  5. Bowel regimen  6. Resume home medications as appropriate  7. Monitor post-op labs  8. DC planning for home    Paroxysmal A-fib: Resume sotalol.  Anticoagulation as noted above.    Anxiety: Resume home Prozac.      Dragon disclaimer:  Part of this encounter note is an electronic transcription/translation of spoken language to printed text. The electronic translation of spoken language may permit erroneous, or at times, nonsensical words or phrases to be inadvertently transcribed; Although I have reviewed the note for such errors, some may still exist.    Stephen Goff MD  09/05/24  16:59 EDT          "

## 2024-09-05 NOTE — OP NOTE
DATE OF PROCEDURE:  09/05/24    PREOPERATIVE DIAGNOSIS: left hip arthritis    POSTOPERATIVE DIAGNOSIS: left hip arthritis    PROCEDURE PERFORMED: left total hip arthroplasty with Smith & Nephew components, anterior modified Hollins-Pineda approach    Surgical Approach: Hip Modified Anterior (Hollins-Pineda)    IMPLANTS: # 52 press-fit R3 cup, 30 screw, neutral polyethylene liner, # 2 standard offset press-fit Polar stem, -3 x 36 Oxinium head    SURGEON: Tommie Metcalf MD    ASSISTANT: Jackie Brand PA-C  (Jackie Brand PA-C was present and necessary for positioning, draping, retraction, instrumentation and closure.)    SPECIMENS: None    IMPLANTS:   Implant Name Type Inv. Item Serial No.  Lot No. LRB No. Used Action   DEV CONTRL TISS STRATAFIX SPIRAL MNCRYL UD 3/0 PLS 60CM - [id number] Implant DEV CONTRL TISS STRATAFIX SPIRAL MNCRYL UD 3/0 PLS 60CM  [employer]  DIV OF J AND J [id number] Left 1 Implanted   DEV CONTRL TISS STRATAFIX SYMM PDS PLUS JITENDRA CT-1 45CM - [id number] Implant DEV CONTRL TISS STRATAFIX SYMM PDS PLUS JITENDRA CT-1 45CM  [employer]  DIV OF J AND J [id number] Left 1 Implanted   SHLL ACET R3 3H STD 52MM - [id number] Implant SHLL ACET R3 3H STD 52MM  [employer] [id number] Left 1 Implanted   SCRW SPH HD REFLECTION 6.5X30MM - [id number] Implant SCRW SPH HD REFLECTION 6.5X30MM  [employer] [id number] Left 1 Implanted   LINER ACET R3 XLPE 0D 17D72QE - [id number] Implant LINER ACET R3 XLPE 0D [id number]  [employer] [id number] Left 1 Implanted   STEM FEM/HIP POLARSTEM W/COLR STD SZ2 - [id number] Implant STEM FEM/HIP POLARSTEM W/COLR STD SZ2  SM[employer] NEP[employer]W [id number] Left 1 Implanted   HD FEM/HIP OXINIUM TPR 12/14 36MM MIN3 - [id number] Implant HD FEM/HIP OXINIUM TPR 12/14 36MM MIN3  [employer] [id number] Left 1 Implanted         ANESTHESIA:  Spinal    STAFF:  Circulator: Margarita Navas RN; Naren Cheng RN; Monica Junior RN  Radiology Technologist: Chele Escamilla RT;  Sandy Wiggins R.T.(R)  Scrub Person: Rikki Scruggs  Vendor Representative: Ramo Kellogg  Nursing Assistant: Roseline Reid; Shannon Garcia PCT  Assistant: Jackie Brand PA-C    ESTIMATED BLOOD LOSS: 200ml     COMPLICATIONS: None    PREOPERATIVE ANTIBIOTICS: Ancef 2 g    INDICATIONS: The patient is a 73 y.o. female with a history of debilitating left hip pain secondary to osteoarthritis, that failed to improve in spite of conservative treatment. The patient opted for a left total hip arthroplasty at this time and consented for the procedure. Please see my office notes for details with regard to preoperative counseling and operative rationale.     DESCRIPTION OF PROCEDURE: The patient was positively identified in the preoperative holding area, brought to the operative suite, and placed in a supine position. After adequate spinal anesthetic had been achieved, the patient was placed in the supine position with a well padded folded sheet bolster under the left flank area, leaving the buttock free. After sterile prep and drape of the left hip and lower extremity, as well as draping the non-operative extremity to allow access for limb length assessment, a timeout procedure was performed to confirm the operative site, as well as the other parameters.     After placing a bump under the knee, a skin incision was made over the lateral border of the tensor fascia latae from the level of the anterior superior iliac spine distally on the anterior aspect of the hip for a modified Hollins-Pineda approach. Following a sharp skin incision, dissection was carried down to the level of the fascia, ensuring clear identification of the interval between the tensor and the fascia laterally.  Fascia was then incised from proximal to distal, leaving a good cuff of tissue for later repair, then working form distal to proximal perforating vessels were identified and cauterized, including the perforating vessels along the anterior edge  "of the gluteus medius.  With the anterior edge of the gluteus medius identified, a Cobra retractor was placed on the capsule over the superior aspect of the femoral neck.  After identifying the superior edge of the vastus lateralis distally, a second Cobra retractor was placed over the capsule overlying the inferior femoral neck.  The reflected head of the rectus femoris was identified and a single prong acetabular retractor was placed under the rectus. The knee bump was then removed, leg externally rotated, and anterior capsule excised and the labrum incised superiorly.  Cobra retractors were repositioned on the exposed femoral neck both superiorly and inferiorly.  Description of femoral head: Complete eburnation, large osteophytes at the head neck junction extending down to the intertrochanteric region.  A neck cut was then made at the head and neck junction with the aid of an oscillating saw blade, followed by a second cut at the base of the femoral neck.  The \"napkin ring\" femoral neck fragment was removed, as well as the femoral head.    Acetabular exposure was then obtained with careful retractor placement and the labrum was then removed from the rim of the acetabulum, preserving the transverse acetabular ligament.  Description of acetabulum: Complete wear, with rimming osteophytes, with large inferior acetabular osteophyte. With the transverse acetabular ligament as a reference for cup positioning, the acetabulum was sequentially reamed up to 52 to accommodate a 52 press-fit R3 cup, which had excellent press-fit characteristics.  A single 30 mm screw was placed which had excellent purchase, followed by a neutral polyethylene liner.    Attention was then redirected towards the femoral aspect. The non-operative limb was then placed on a padded Martínez stand, to allow for appropriate positioning of the operative limb.  Using the bone hook for traction in the calcar region of the proximal femur, the posterior " capsule was released adjacent to the greater trochanter to allow for delivery of the proximal femur with a double fang retractor.  After appropriate external rotation of the proximal femur and further adduction of the leg, a Villatoro retractor was placed in the region of the calcar and femoral preparations were made to accommodate the femoral stem.  Box osteotome was used to prepare the lateral aspect, followed by the T-handle canal finder, then sequential broaching of the femur to accommodate a # 2 broach, standard offset neck, with a -3 x 36 head.      Trial reduction was performed, full arc of motion noted, with appropriate limb lengths after leveling the table.  Intraoperative fluoroscopy showed appropriate implant alignment and leg lengths.  The hip was again dislocated and the final # 2 standard offset press-fit Polar stem placed, followed by -3 x 36 Oxinium head, with the same reduction characteristics were noted as with the trial with no dislocation throughout the full arc of motion and appropriate limb lengths.      Therefore, the hip was copiously irrigated and attention directed towards closure. Fascia latae was closed with #1 Vicryl in an interrupted figure-of-eight fashion in 3 strategic locations both proximally, distally and in the central portion, followed by oversewing this from distal to proximal with a #1 StrataFix symmetric, which nicely sealed that layer, followed by closure of the subcutaneous layer with 2-0 Vicryl and the skin with 3-0 StrataFix in a running subcuticular fashion.  Adhesive wound closure dressing was applied followed by a sterile dressing with 4 x 4s secured with micropore tape.  The patient tolerated the procedure well and was brought to the recovery room in good condition.     POSTOPERATIVE PLAN:  1. The patient will begin early range of motion and weight-bearing per the post anterior total hip arthroplasty protocol.   2. I anticipate brief hospitalization for initial  rehabilitation and pain control followed by continued rehabilitation home health/outpatient physical therapy setting.  Patient may be ready for discharge later today if she is cleared medically and by physical therapy.  Follow-up in 3 weeks as planned.   3. Postoperative medical management with Dr. Goff.  4. Postoperative DVT prophylaxis with Eliquis, half dose for the first 4 days, then resume full dose.  Patient was on Eliquis preoperatively for atrial fibrillation.  5. Postoperative IV antibiotics with Ancef.      Tommie Metcalf MD  09/05/24  15:20 EDT

## 2024-09-05 NOTE — NURSING NOTE
Pt doing well at this time.  Pt continues to have a lot of tingling and numbness rachel in her L foot secondary to the spinal.  PT attempted to work with pt without success secondary the numbness and tingling.  After discussion with pt and spouse at bedside they requested pt spend the night.  Pt continues to remain pain free at this time. We will continue to monitor pts progress and anticipate discharge tomorrow.

## 2024-09-05 NOTE — PLAN OF CARE
Goal Outcome Evaluation:  Plan of Care Reviewed With: patient        Progress: improving  Outcome Evaluation: PATIENT DEMONSTRATED GOOD CONTROL OF WALKER IN HALLWAY.  RECOMMEND SKILLED PT TOMORROW AND HOME WITH OUTPATIENT PT.      Anticipated Discharge Disposition (PT): home with assist, home with outpatient therapy services

## 2024-09-05 NOTE — ANESTHESIA POSTPROCEDURE EVALUATION
Patient: Mar Joseph    Procedure Summary       Date: 09/05/24 Room / Location:  [facility] OR 11 /  [facility]    Anesthesia Start: 1303 Anesthesia Stop: 1525    Procedure: TOTAL HIP ARTHROPLASTY ANTERIOR MODIFIED (Left: Hip) Diagnosis:       Primary osteoarthritis of left hip      (Primary osteoarthritis of left hip [M16.12])    Surgeons: Tommie Metcalf MD Provider: Mo Jolly Jr., MD    Anesthesia Type: spinal ASA Status: 3            Anesthesia Type: spinal    Vitals  Vitals Value Taken Time   /76 09/05/24 1520   Temp 97.4 °F (36.3 °C) 09/05/24 1520   Pulse 74 09/05/24 1523   Resp 16 09/05/24 1520   SpO2 100 % 09/05/24 1523   Vitals shown include unfiled device data.        Post Anesthesia Care and Evaluation    Patient location during evaluation: PACU  Patient participation: complete - patient participated  Level of consciousness: awake  Pain score: 0  Pain management: adequate    Airway patency: patent  Anesthetic complications: No anesthetic complications  PONV Status: none  Cardiovascular status: acceptable and stable  Respiratory status: nasal cannula, unassisted, acceptable and spontaneous ventilation  Hydration status: acceptable

## 2024-09-05 NOTE — ANESTHESIA PROCEDURE NOTES
Spinal Block      Patient reassessed immediately prior to procedure    Patient location during procedure: OR  Indication:at surgeon's request  Performed By  CRNA/DISHA: Miguel Sanches CRNA  Preanesthetic Checklist  Completed: patient identified, IV checked, site marked, risks and benefits discussed, surgical consent, monitors and equipment checked, pre-op evaluation and timeout performed  Spinal Block Prep:  Patient Position:sitting  Sterile Tech:cap, gloves, sterile barriers and mask  Prep:Chloraprep  Patient Monitoring:blood pressure monitoring, continuous pulse oximetry and EKG    Spinal Block Procedure  Approach:midline  Guidance:landmark technique and palpation technique  Location:L4-L5  Needle Type:Quincke  Needle Gauge:22 G  Placement of Spinal needle event:cerebrospinal fluid aspirated  Paresthesia: no  Fluid Appearance:clear  Medications: bupivacaine (MARCAINE) 0.5 % injection - Injection   1.6 mL - 9/5/2024 1:15:00 PM   Post Assessment  Patient Tolerance:patient tolerated the procedure well with no apparent complications  Complications no  Additional Notes  Procedure:  Pt assisted to sitting position, with legs in position of comfort over side of bed.  Pt. instructed in optimal spine presentation, the spine was prepped/ Draped and the skin at insertion site was anesthetized with 1% Lidocaine 2 ml.  The spinal needle was then advanced until CSF flow was obtained and LA was injected:

## 2024-09-05 NOTE — ANESTHESIA PREPROCEDURE EVALUATION
Anesthesia Evaluation     Patient summary reviewed and Nursing notes reviewed   no history of anesthetic complications:   NPO Solid Status: > 8 hours  NPO Liquid Status: > 2 hours           Airway   Mallampati: II  TM distance: >3 FB  Neck ROM: full  No difficulty expected  Dental - normal exam     Pulmonary - negative pulmonary ROS and normal exam    breath sounds clear to auscultation  Cardiovascular - normal exam    ECG reviewed  PT is on anticoagulation therapy  Rhythm: regular  Rate: normal    (+) dysrhythmias (Off eliquis x 3 days) Atrial Fib    ROS comment: 2019 Echo:  · Left ventricular systolic function is normal. Estimated EF = 60%.  · Mild to moderate biatrial enlargement.  · No evidence of intracardiac thrombus or mass, clear left atrial appendage.  · No significant valvular disease.      Neuro/Psych- negative ROS  GI/Hepatic/Renal/Endo - negative ROS     Musculoskeletal     Abdominal    Substance History      OB/GYN          Other   arthritis,                 Anesthesia Plan    ASA 3     spinal     intravenous induction     Anesthetic plan, risks, benefits, and alternatives have been provided, discussed and informed consent has been obtained with: patient.    Plan discussed with CRNA.    CODE STATUS:

## 2024-09-05 NOTE — THERAPY EVALUATION
Patient Name: Mar Joseph  : 1950    MRN: [id number]                              Today's Date: 2024       Admit Date: 2024    Visit Dx:     ICD-10-CM ICD-9-CM   1. Primary osteoarthritis of left hip  M16.12 715.15     Patient Active Problem List   Diagnosis    Paroxysmal atrial fibrillation    Tobacco use    Chronic colitis    Mixed hyperlipidemia    Anxiety    Chronic anticoagulation    Long term current use of antiarrhythmic drug    Spinal stenosis of lumbar region    Degenerative arthritis of hip    Status post total replacement of left hip     Past Medical History:   Diagnosis Date    A-fib     Abnormal ECG     Allergic 62556    Arrhythmia     Arthritis     Arthritis of back     Back problem     Bronchitis     Cervical disc disorder     Chronic alcohol use     Chronic colitis     Fracture of hip     Fracture, femur     Glaucoma Years ago    Heart disease     Hip arthrosis     History of cardioversion     History of transesophageal echocardiography (YOLANDA)     Inflammatory bowel disease Years ago    Low back pain Years ago    Low back strain     Lumbosacral disc disease     Menopause     Mixed hyperlipidemia 10/11/2019    Palpitation     Benign palpitations    Scoliosis     Tibia/fibula fracture     Fall with right tibia/fibula fracture, status post tibia IM harish, 2013.    Tobacco use     Visual impairment Years ago    Wears glasses      Past Surgical History:   Procedure Laterality Date    CARDIAC ELECTROPHYSIOLOGY PROCEDURE N/A 2020    Procedure: PVA (paroxysmal), hold Sotalol 3 days, DNS Eliquis, Rhythmia (BSC) only using direct sense;  Surgeon: Isiah Garcia DO;  Location: [facility] INVASIVE LOCATION;  Service: Cardiovascular;  Laterality: N/A;    CARDIOVERSION      COLONOSCOPY      HAND SURGERY      LEG SURGERY Right     states five breaks, steal harish in R leg    TRIGGER POINT INJECTION        General Information       Row Name 24 [id number]          Physical Therapy  Time and Intention    Document Type evaluation  -SC     Mode of Treatment physical therapy  -SC       Row Name 09/05/24 1827          General Information    Patient Profile Reviewed yes  -SC     Prior Level of Function independent:;gait;transfer  WALKS WITH A ROLLATOR  -SC     Existing Precautions/Restrictions left;hip, anterior;hip  LATERAL APPROACH  -SC     Barriers to Rehab none identified  -[facility] Name 09/05/24 1827          Living Environment    People in Home spouse  -[facility] Name 09/05/24 1827          Home Main Entrance    Number of Stairs, Main Entrance three  -SC     Stair Railings, Main Entrance railings safe and in good condition  -[facility] Name 09/05/24 1827          Stairs Within Home, Primary    Number of Stairs, Within Home, Primary none  -[facility] Name 09/05/24 1827          Cognition    Orientation Status (Cognition) oriented x 4  -[facility] Name 09/05/24 1827          Safety Issues, Functional Mobility    Impairments Affecting Function (Mobility) strength;balance  -SC     Comment, Safety Issues/Impairments (Mobility) ALERT, FOLLOWING COMMANDS  -SC               User Key  (r) = Recorded By, (t) = Taken By, (c) = Cosigned By      Initials Name Provider Type    SC Juvenal Roberts, PT Physical Therapist                   Mobility       [facility] Name 09/05/24 1828          Bed Mobility    Bed Mobility supine-sit;scooting/bridging  -SC     Scooting/Bridging Swain (Bed Mobility) independent  -SC     Supine-Sit Swain (Bed Mobility) independent  -[facility] Name 09/05/24 1828          Transfers    Comment, (Transfers) CUES FOR HAND PLACEMENT GETTING OFF BED, COMMODE AND RECLINER. NEEDS REMINDERS  -[facility] Name 09/05/24 1828          Sit-Stand Transfer    Sit-Stand Swain (Transfers) 1 person assist;contact guard;verbal cues  -SC     Assistive Device (Sit-Stand Transfers) walker, front-wheeled  -[facility] Name 09/05/24 1828          Gait/Stairs (Locomotion)     [place] Level (Gait) independent;1 person assist;verbal cues  -SC     Assistive Device (Gait) walker, front-wheeled  -SC     Patient was able to Ambulate yes  -SC     Distance in Feet (Gait) 160  -SC     Deviations/Abnormal Patterns (Gait) left sided deviations;base of support, narrow;stride length decreased  R FOOT TURNED IN . CHRONIC INJURY  -SC     Bilateral Gait Deviations forward flexed posture  -SC     Comment, (Gait/Stairs) gT TRAINING FOCUSED ON CONTROLING WALKER IN HALLWAY. CUES TO STAY CLOSER TO WALKER AND TAKE SMALL STEPS ON TRUNS. NO LOB NOTED  -SC               User Key  (r) = Recorded By, (t) = Taken By, (c) = Cosigned By      Initials Name Provider Type    SC Alejandra, Juvenal ATKINS, PT Physical Therapist                   Obj/Interventions       [facility] Name 09/05/24 1830          Range of Motion Comprehensive    General Range of Motion no range of motion deficits identified  -SC       Row Name 09/05/24 1830          Strength Comprehensive (MMT)    Comment, General Manual Muscle Testing (MMT) Assessment L LE TIB ANT 4/5, QUADS 4+/5  -[facility] Name 09/05/24 1830          Motor Skills    Therapeutic Exercise ankle  -[facility] Name 09/05/24 1830          Ankle (Therapeutic Exercise)    Ankle (Therapeutic Exercise) AROM (active range of motion)  -SC     Ankle AROM (Therapeutic Exercise) bilateral;dorsiflexion;plantarflexion;10 repetitions  -[facility] Name 09/05/24 1830          Balance    Balance Assessment standing dynamic balance  -SC     Dynamic Standing Balance contact guard;verbal cues  -SC     Position/Device Used, Standing Balance supported;walker, rolling  -SC     Comment, Balance NO LOB  -[facility] Name 09/05/24 1830          Sensory Assessment (Somatosensory)    Sensory Assessment (Somatosensory) sensation intact  -SC               User Key  (r) = Recorded By, (t) = Taken By, (c) = Cosigned By      Initials Name Provider Type    SC Juvenal Roberts, PT Physical Therapist                    Goals/Plan       [facility] Name 09/05/24 1833          Gait Training Goal 1 (PT)    Activity/Assistive Device (Gait Training Goal 1, PT) gait (walking locomotion);walker, rolling  -SC     [place] Level (Gait Training Goal 1, PT) modified independence  -SC     Distance (Gait Training Goal 1, PT) 500  -SC     Time Frame (Gait Training Goal 1, PT) long term goal (LTG);10 days  -[facility] Name 09/05/24 1833          Stairs Goal 1 (PT)    Activity/Assistive Device (Stairs Goal 1, PT) stairs, all skills  -SC     [place] Level/Cues Needed (Stairs Goal 1, PT) contact guard required  -SC     Number of Stairs (Stairs Goal 1, PT) 3  -SC     Time Frame (Stairs Goal 1, PT) long term goal (LTG);10 days  -SC               User Key  (r) = Recorded By, (t) = Taken By, (c) = Cosigned By      Initials Name Provider Type    SC Juvenal Roberts, PT Physical Therapist                   Clinical Impression       [facility] Name 09/05/24 1831          Pain    Pretreatment Pain Rating 0/10 - no pain  -SC     Posttreatment Pain Rating 3/10  -SC     Pain Location - Side/Orientation Left  -SC     Pain Location - hip  -SC     Pain Intervention(s) Repositioned  -[facility] Name 09/05/24 1831          Plan of Care Review    Plan of Care Reviewed With patient  -SC     Progress improving  -SC     Outcome Evaluation PATIENT DEMONSTRATED GOOD CONTROL OF WALKER IN HALLWAY.  RECOMMEND SKILLED PT TOMORROW AND HOME WITH OUTPATIENT PT.  -[facility] Name 09/05/24 1831          Therapy Assessment/Plan (PT)    Patient/Family Therapy Goals Statement (PT) HOME  -SC     Rehab Potential (PT) good, to achieve stated therapy goals  -SC     Criteria for Skilled Interventions Met (PT) yes;meets criteria  -SC     Therapy Frequency (PT) 2 times/day  -SC       Row Name 09/05/24 1831          Positioning and Restraints    Pre-Treatment Position in bed  -SC     Post Treatment Position chair  -SC     In Chair notified nsg;reclined;sitting;call light within  reach;encouraged to call for assist  -SC               User Key  (r) = Recorded By, (t) = Taken By, (c) = Cosigned By      Initials Name Provider Type    SC Juvenal Roberts, PT Physical Therapist                   Outcome Measures       Row Name 09/05/24 1833 09/05/24 1829       How much help from another person do you currently need...    Turning from your back to your side while in flat bed without using bedrails? 4  -SC 3  -LH    Moving from lying on back to sitting on the side of a flat bed without bedrails? 4  -SC 3  -LH    Moving to and from a bed to a chair (including a wheelchair)? 3  -SC 3  -LH    Standing up from a chair using your arms (e.g., wheelchair, bedside chair)? 3  -SC 3  -LH    Climbing 3-5 steps with a railing? 3  -SC 3  -LH    To walk in hospital room? 3  -SC 3  -LH    AM-PAC 6 Clicks Score (PT) 20  -SC 18  -LH    Highest Level of Mobility Goal 6 --> Walk 10 steps or more  -SC 6 --> Walk 10 steps or more  -LH      Row Name 09/05/24 1634 09/05/24 1627       How much help from another person do you currently need...    Turning from your back to your side while in flat bed without using bedrails? 3  - 3  -LH    Moving from lying on back to sitting on the side of a flat bed without bedrails? 3  - 3  -LH    Moving to and from a bed to a chair (including a wheelchair)? 3  - 3  -LH    Standing up from a chair using your arms (e.g., wheelchair, bedside chair)? 3  - 3  -LH    Climbing 3-5 steps with a railing? 2  - 2  -LH    To walk in hospital room? 3  - 3  -LH    AM-PAC 6 Clicks Score (PT) 17  - 17  -LH    Highest Level of Mobility Goal 5 --> Static standing  - 5 --> Static standing  -      Row Name 09/05/24 1833          PADD    Diagnosis 2  -SC     Gender 1  -SC     Age Group 1  -SC     Gait Distance 1  -SC     Assist Level 1  -SC     Home Support 3  -SC     PADD Score 9  -SC     Prediction by PADD Score directly home (with home health or out-patient rehab)  -SC       Row Name  09/05/24 1833          Functional Assessment    Outcome Measure Options AM-PAC 6 Clicks Basic Mobility (PT);PADD  -SC               User Key  (r) = Recorded By, (t) = Taken By, (c) = Cosigned By      Initials Name Provider Type    SC Juvenal Roberts PT Physical Therapist    Izabella Resendiz, RN Registered Nurse                                 Physical Therapy Education       Title: PT OT SLP Therapies (Done)       Topic: Physical Therapy (Done)       Point: Mobility training (Done)       Learning Progress Summary             Patient Eager, E,H, VU by SC at 9/5/2024 1834    Comment: REVIEWED HIP PRECAUTIONS                         Point: Home exercise program (Done)       Learning Progress Summary             Patient Eager, E,H, VU by SC at 9/5/2024 1834    Comment: REVIEWED HIP PRECAUTIONS                         Point: Body mechanics (Done)       Learning Progress Summary             Patient Eager, E,H, VU by SC at 9/5/2024 1834    Comment: REVIEWED HIP PRECAUTIONS                         Point: Precautions (Done)       Learning Progress Summary             Patient Eager, E,H, VU by SC at 9/5/2024 1834    Comment: REVIEWED HIP PRECAUTIONS                                         User Key       Initials Effective Dates Name Provider Type Discipline    SC 02/03/23 -  Juvenal Roberts PT Physical Therapist PT                  PT Recommendation and Plan     Plan of Care Reviewed With: patient  Progress: improving  Outcome Evaluation: PATIENT DEMONSTRATED GOOD CONTROL OF WALKER IN HALLWAY.  RECOMMEND SKILLED PT TOMORROW AND HOME WITH OUTPATIENT PT.     Time Calculation:   PT Evaluation Complexity  History, PT Evaluation Complexity: 3 or more personal factors and/or comorbidities  Examination of Body Systems (PT Eval Complexity): total of 4 or more elements  Clinical Presentation (PT Evaluation Complexity): evolving  Clinical Decision Making (PT Evaluation Complexity): moderate complexity  Overall Complexity (PT Evaluation  Complexity): moderate complexity     PT Charges       Row Name 09/05/24 1800             Time Calculation    Start Time 1800  -SC      PT Received On 09/05/24  -SC      PT Goal Re-Cert Due Date 09/15/24  -SC         Untimed Charges    PT Eval/Re-eval Minutes 40  -SC         Total Minutes    Untimed Charges Total Minutes 40  -SC       Total Minutes 40  -SC                User Key  (r) = Recorded By, (t) = Taken By, (c) = Cosigned By      Initials Name Provider Type    SC Juvenal Roberts PT Physical Therapist                  Therapy Charges for Today       Code Description Service Date Service Provider Modifiers Qty    [id number] HC PT EVAL MOD COMPLEXITY 3 9/5/2024 Juvenal Roberts PT GP 1            PT G-Codes  Outcome Measure Options: AM-PAC 6 Clicks Basic Mobility (PT), PADD  AM-PAC 6 Clicks Score (PT): 20  PT Discharge Summary  Anticipated Discharge Disposition (PT): home with assist, home with outpatient therapy services    Juvenal Roberts, PT  9/5/2024

## 2024-09-06 VITALS
HEART RATE: 74 BPM | OXYGEN SATURATION: 97 % | DIASTOLIC BLOOD PRESSURE: 53 MMHG | SYSTOLIC BLOOD PRESSURE: 110 MMHG | RESPIRATION RATE: 18 BRPM | TEMPERATURE: 99 F

## 2024-09-06 LAB
ANION GAP SERPL CALCULATED.3IONS-SCNC: 10 MMOL/L (ref 5–15)
BUN SERPL-MCNC: 5 MG/DL (ref 8–23)
BUN/CREAT SERPL: 12.5 (ref 7–25)
CALCIUM SPEC-SCNC: 9.2 MG/DL (ref 8.6–10.5)
CHLORIDE SERPL-SCNC: 100 MMOL/L (ref 98–107)
CO2 SERPL-SCNC: 25 MMOL/L (ref 22–29)
CREAT SERPL-MCNC: 0.4 MG/DL (ref 0.57–1)
DEPRECATED RDW RBC AUTO: 46.4 FL (ref 37–54)
EGFRCR SERPLBLD CKD-EPI 2021: 105.3 ML/MIN/1.73
ERYTHROCYTE [DISTWIDTH] IN BLOOD BY AUTOMATED COUNT: 12.5 % (ref 12.3–15.4)
GLUCOSE SERPL-MCNC: 123 MG/DL (ref 65–99)
HCT VFR BLD AUTO: 30.3 % (ref 34–46.6)
HGB BLD-MCNC: 10.2 G/DL (ref 12–15.9)
MCH RBC QN AUTO: 33.8 PG (ref 26.6–33)
MCHC RBC AUTO-ENTMCNC: 33.7 G/DL (ref 31.5–35.7)
MCV RBC AUTO: 100.3 FL (ref 79–97)
PLATELET # BLD AUTO: 294 10*3/MM3 (ref 140–450)
PMV BLD AUTO: 10.8 FL (ref 6–12)
POTASSIUM SERPL-SCNC: 4.2 MMOL/L (ref 3.5–5.2)
RBC # BLD AUTO: 3.02 10*6/MM3 (ref 3.77–5.28)
SODIUM SERPL-SCNC: 135 MMOL/L (ref 136–145)
WBC NRBC COR # BLD AUTO: 10.49 10*3/MM3 (ref 3.4–10.8)

## 2024-09-06 PROCEDURE — 99024 POSTOP FOLLOW-UP VISIT: CPT | Performed by: ORTHOPAEDIC SURGERY

## 2024-09-06 PROCEDURE — 97116 GAIT TRAINING THERAPY: CPT

## 2024-09-06 PROCEDURE — 85027 COMPLETE CBC AUTOMATED: CPT | Performed by: ORTHOPAEDIC SURGERY

## 2024-09-06 PROCEDURE — 80048 BASIC METABOLIC PNL TOTAL CA: CPT | Performed by: INTERNAL MEDICINE

## 2024-09-06 PROCEDURE — 97165 OT EVAL LOW COMPLEX 30 MIN: CPT

## 2024-09-06 PROCEDURE — 25010000002 CEFAZOLIN PER 500 MG: Performed by: ORTHOPAEDIC SURGERY

## 2024-09-06 PROCEDURE — 97535 SELF CARE MNGMENT TRAINING: CPT

## 2024-09-06 RX ORDER — MELOXICAM 15 MG/1
15 TABLET ORAL DAILY
Qty: 15 TABLET | Refills: 0 | Status: SHIPPED | OUTPATIENT
Start: 2024-09-06 | End: 2024-09-21

## 2024-09-06 RX ORDER — DOCUSATE SODIUM 100 MG/1
100 CAPSULE, LIQUID FILLED ORAL 2 TIMES DAILY
Qty: 30 CAPSULE | Refills: 0 | Status: SHIPPED | OUTPATIENT
Start: 2024-09-06 | End: 2024-09-21

## 2024-09-06 RX ORDER — OXYCODONE HYDROCHLORIDE 5 MG/1
5 TABLET ORAL EVERY 4 HOURS PRN
Qty: 40 TABLET | Refills: 0 | Status: SHIPPED | OUTPATIENT
Start: 2024-09-06

## 2024-09-06 RX ADMIN — APIXABAN 2.5 MG: 2.5 TABLET, FILM COATED ORAL at 09:08

## 2024-09-06 RX ADMIN — ACETAMINOPHEN 1000 MG: 500 TABLET ORAL at 04:55

## 2024-09-06 RX ADMIN — OXYCODONE HYDROCHLORIDE 5 MG: 5 TABLET ORAL at 00:28

## 2024-09-06 RX ADMIN — OXYCODONE HYDROCHLORIDE 5 MG: 5 TABLET ORAL at 09:08

## 2024-09-06 RX ADMIN — SOTALOL HYDROCHLORIDE 80 MG: 80 TABLET ORAL at 09:08

## 2024-09-06 RX ADMIN — ACETAMINOPHEN 1000 MG: 500 TABLET ORAL at 12:06

## 2024-09-06 RX ADMIN — FLUOXETINE HYDROCHLORIDE 40 MG: 20 CAPSULE ORAL at 09:07

## 2024-09-06 RX ADMIN — SODIUM CHLORIDE 2000 MG: 900 INJECTION INTRAVENOUS at 04:55

## 2024-09-06 RX ADMIN — OXYCODONE HYDROCHLORIDE 5 MG: 5 TABLET ORAL at 13:31

## 2024-09-06 RX ADMIN — OXYCODONE HYDROCHLORIDE 5 MG: 5 TABLET ORAL at 05:00

## 2024-09-06 NOTE — DISCHARGE SUMMARY
Patient Name: Mar Joseph  MRN: [id number]  : 1950  DOS: 2024    Attending: Tommie Metcalf MD    Primary Care Provider: Anand Pineda DO    Date of Admission:.2024  9:47 AM    Date of Discharge:  2024    Discharge Diagnosis:   Status post total replacement of left hip    Paroxysmal atrial fibrillation    Anxiety    Chronic anticoagulation    Degenerative arthritis of hip      Hospital Course  At admit:  Patient is a pleasant 73 y.o. female presented for scheduled surgery by Dr. Metcalf.     Patient has had left hip pain for the last 4 months due to hip arthritis that has been quite severe.  She has been using a cane or a walker for ambulation.  She has failed conservative management and opted to proceed with surgery.     I saw her preoperatively, reviewed with patient and her  past medical history and home medications.  She is on anticoagulation due to history of paroxysmal A-fib.  This is on hold for surgery.     Subsequent notes indicate she later underwent left total hip arthroplasty, modified anterior, under spinal anesthesia, tolerated surgery well, was admitted for further management.     After admit  Patient was provided pain medications as needed for pain control.  Adjustments were made to pain medications to optimize postop pain management when indicated. Risks and benefits of opiate medications discussed with patient. MITCH report was reviewed.    She was seen by PT and OT and has progressed well over her stay.    Patient used an IS for atelectasis prophylaxis and apixaban along with mechanicals for DVT prophylaxis.    Home medications were resumed as appropriate, and labs were monitored and remained fairly stable.     With the progress she has made, patient is ready for DC home today.      Discussed with patient regarding plan and she shows understanding and agreement.    Patient will have PT following discharge.        Procedures Performed  Procedure(s):  TOTAL  HIP ARTHROPLASTY ANTERIOR MODIFIED LEFT       Pertinent Test Results:    I reviewed the patient's new clinical results.   Results from last 7 days   Lab Units 24  0916   WBC 10*3/mm3 10.49   HEMOGLOBIN g/dL 10.2*   HEMATOCRIT % 30.3*   PLATELETS 10*3/mm3 294     Results from last 7 days   Lab Units 24  0916   SODIUM mmol/L 135*   POTASSIUM mmol/L 4.2   CHLORIDE mmol/L 100   CO2 mmol/L 25.0   BUN mg/dL 5*   CREATININE mg/dL 0.40*   CALCIUM mg/dL 9.2   GLUCOSE mg/dL 123*     I reviewed the patient's new imaging including images and reports.    Physical therapy  Date of Service: 2445  Creation Time: 24     Signed         Goal Outcome Evaluation:  Plan of Care Reviewed With: patient  Progress: improving  Outcome Evaluation: Patient able to ambulate safely with walker on rueda. She demonstrated safe mobility on stairs. She is going home with her spouse and will recieve outpatient PT        Anticipated Discharge Disposition (PT): home with assist, home with outpatient therapy services                Discharge Assessment:      Visit Vitals  /53 (BP Location: Left arm, Patient Position: Sitting)   Pulse 74   Temp 99 °F (37.2 °C) (Oral)   Resp 18   LMP  (LMP Unknown)   SpO2 97%     Temp (24hrs), Av.7 °F (36.5 °C), Min:96.5 °F (35.8 °C), Max:99 °F (37.2 °C)      General Appearance:    Alert, cooperative, in no acute distress   Lungs:     Clear to auscultation,respirations regular, even and                   unlabored    Heart:    Regular rhythm and normal rate, normal S1 and S2   Abdomen:     Normal bowel sounds, no masses, no organomegaly, soft        non-tender, non-distended, no guarding, no rebound                 tenderness   Extremities:   CDI dressing over hip incision   Pulses:   Pulses palpable and equal bilaterally   Skin:   No bleeding, bruising or rash   Neurologic:   Cranial nerves 2 - 12 grossly intact, sensation intact, Flexion and dorsiflexion intact bilateral feet.          Discharge Disposition: Home         Discharge Medications        New Medications        Instructions Start Date   docusate sodium 100 MG capsule  Commonly known as: COLACE   100 mg, Oral, 2 Times Daily      meloxicam 15 MG tablet  Commonly known as: MOBIC   15 mg, Oral, Daily      oxyCODONE 5 MG immediate release tablet  Commonly known as: Roxicodone   5 mg, Oral, Every 4 Hours PRN             Changes to Medications        Instructions Start Date   apixaban 2.5 MG tablet tablet  Commonly known as: MAIA  What changed: You were already taking a medication with the same name, and this prescription was added. Make sure you understand how and when to take each.   2.5 mg, Oral, Every 12 Hours Scheduled, Resume 5 mg dose on 9/10/24 morning.      apixaban 5 MG tablet tablet  Commonly known as: Eliquis  What changed: These instructions start on September 10, 2024. If you are unsure what to do until then, ask your doctor or other care provider.   5 mg, Oral, 2 Times Daily   Start Date: September 10, 2024            Continue These Medications        Instructions Start Date   acetaminophen 500 MG tablet  Commonly known as: TYLENOL   500 mg, Oral, Every 6 Hours PRN      albuterol sulfate  (90 Base) MCG/ACT inhaler  Commonly known as: PROVENTIL HFA;VENTOLIN HFA;PROAIR HFA   2 puffs, Inhalation, 2 Times Daily      FLUoxetine 40 MG capsule  Commonly known as: PROzac   40 mg, Oral, Daily      gabapentin 400 MG capsule  Commonly known as: NEURONTIN   Take  by mouth 2 (Two) Times a Day.      multivitamin with minerals tablet tablet   1 tablet, Oral, 2 Times Daily      sotalol 80 MG tablet  Commonly known as: Betapace   80 mg, Oral, 2 Times Daily      travoprost (REBEKA free) 0.004 % solution ophthalmic solution  Commonly known as: TRAVATAN   travoprost 0.004 % eye drops   Instill by ophthalmic route.             Stop These Medications      Antiseptic Skin Cleanser 4 % solution  Generic drug: Chlorhexidine Gluconate      traMADol 50 MG tablet  Commonly known as: ULTRAM              Discharge Diet: Resume prior    Activity at Discharge: Weightbearing as tolerated       Future Appointments   Date Time Provider Department Center   9/23/2024 11:00 AM Anand Pineda DO [facility]   9/25/2024 11:20 AM Maryjane Martins PA-C [facility]   1/29/2025 11:15 AM Kirk Ocasio III, MD [facility]   3/24/2025  1:15 PM Isiah Garcia DO [facility]        Dragon disclaimer:  Part of this encounter note is an electronic transcription/translation of spoken language to printed text. The electronic translation of spoken language may permit erroneous, or at times, nonsensical words or phrases to be inadvertently transcribed; Although I have reviewed the note for such errors, some may still exist.       Stephen Goff MD  09/06/24  13:39 EDT

## 2024-09-06 NOTE — DISCHARGE INSTRUCTIONS
EXOFIN CARING FOR YOUR WOUND    AFTER exofin Fusion SKIN CLOSURE SYSTEM HAS BEEN APPLIED    YOUR HEALTHCARE PROFESSIONAL HAS CHOSEN TO USE exofin Fusion SKIN CLOSURE SYSTEM TO CLOSE YOUR WOUND.    exocin Fusion is the combination of a mesh and liquid adhesive that allows the incision or wound to be held together during the healing process.    exocin Fusion should remain in place until your healthcare professional; has determined that adequate healing has occurred, which is usually anywhere between 7 to 14 days. In most cases, exofin Fusion is easily removed with little or no discomfort.    In the event that you notice that exofin Fusion is beginning to loosen or may be coming off, contact your healthcare professional.    The following information is provided to help you understand how to care for your incision and is based on the FDA-cleared product labeling.    You should always follow the instructions of your healthcare provider.    THINGS TO KNOW    BATHING OR SHOWERING    If directed by your healthcare professional, you may occasionally and briefly wet your incision or wound that was treated with exofin Fusion in the shower or bath. Do not soak or scrub your incision or wound. Do not swim or soak your incision or wound in water. After showering or bathing, gently blot your incision or wound dry with a soft towel. If a dry protective dressing is being used over exofin Fusion, it should be replaced with a fresh, dry protective dressing after showering or bathing as directed by your healthcare practitioner. Care should also be taken so that any tape that may be part of the dry protective dressing does not come into contact with exofin Fusion because when the tape is removed, it may also remove exofin Fusion.    WOUND HEALING  If you experience any redness, swelling, discomfort, warmth or pus, contact your healthcare professional and he or she will determine how your incision or wound is healing and take the  necessary steps to address any issues.    EXERCISE  Do not engage in strenuous exercise that may cause additional stress on your incision or wound. Follow your healthcare professional's guidance about when you can return to your normal activities.    OINTMENTS OR LIQUIDS  Topical ointments, liquids or any other products (other than dry bandages) should not be applied to the incision while exofin Fusion is in place. These may loosen exofin Fusion from the skin before it has completely healed.    REMOVING exofin Fusion  Your healthcare professional will determine when the healing process of your incision or wound has been completed and exofin Fusion is ready to be removed, which is usually between 7 to 14 days. When healing is complete, your healthcare professional will carefully peel off the exofin Fusion.    Prior to removal, do not scratch, rub or pick at the mesh. This may loosen the adhesive and mesh before the skin is healed.  In the event that you notice the exofin Fusion is beginning to loosen and may be coming off or comes off with the skin/wound, contract your healthcare professional.    For complete directions on the use of exofin Fusion, please refer to instructions for Use (IFU) included in the product packaging.  IF YOU HAVE ANY QUESTIONS OR CONCERNS ABOUT exofin Fusion PLEASE CONTACT YOUR HEALTHCARE PROFESSIONAL.EXOFIN CARING FOR YOUR WOUND    AFTER exofin Fusion SKIN CLOSURE SYSTEM HAS BEEN APPLIED    YOUR HEALTHCARE PROFESSIONAL HAS CHOSEN TO USE exofin Fusion SKIN CLOSURE SYSTEM TO CLOSE YOUR WOUND.    exocin Fusion is the combination of a mesh and liquid adhesive that allows the incision or wound to be held together during the healing process.    exocin Fusion should remain in place until your healthcare professional; has determined that adequate healing has occurred, which is usually anywhere between 7 to 14 days. In most cases, exofin Fusion is easily removed with little or no discomfort.    In  the event that you notice that exofin Fusion is beginning to loosen or may be coming off, contact your healthcare professional.    The following information is provided to help you understand how to care for your incision and is based on the FDA-cleared product labeling.    You should always follow the instructions of your healthcare provider.    THINGS TO KNOW    BATHING OR SHOWERING    If directed by your healthcare professional, you may occasionally and briefly wet your incision or wound that was treated with exofin Fusion in the shower or bath. Do not soak or scrub your incision or wound. Do not swim or soak your incision or wound in water. After showering or bathing, gently blot your incision or wound dry with a soft towel. If a dry protective dressing is being used over exofin Fusion, it should be replaced with a fresh, dry protective dressing after showering or bathing as directed by your healthcare practitioner. Care should also be taken so that any tape that may be part of the dry protective dressing does not come into contact with exofin Fusion because when the tape is removed, it may also remove exofin Fusion.    WOUND HEALING  If you experience any redness, swelling, discomfort, warmth or pus, contact your healthcare professional and he or she will determine how your incision or wound is healing and take the necessary steps to address any issues.    EXERCISE  Do not engage in strenuous exercise that may cause additional stress on your incision or wound. Follow your healthcare professional's guidance about when you can return to your normal activities.    OINTMENTS OR LIQUIDS  Topical ointments, liquids or any other products (other than dry bandages) should not be applied to the incision while exofin Fusion is in place. These may loosen exofin Fusion from the skin before it has completely healed.    REMOVING exofin Fusion  Your healthcare professional will determine when the healing process of your  incision or wound has been completed and exofin Fusion is ready to be removed, which is usually between 7 to 14 days. When healing is complete, your healthcare professional will carefully peel off the exofin Fusion.    Prior to removal, do not scratch, rub or pick at the mesh. This may loosen the adhesive and mesh before the skin is healed.  In the event that you notice the exofin Fusion is beginning to loosen and may be coming off or comes off with the skin/wound, contract your healthcare professional.    For complete directions on the use of exofin Fusion, please refer to instructions for Use (IFU) included in the product packaging.  IF YOU HAVE ANY QUESTIONS OR CONCERNS ABOUT exofin Fusion PLEASE CONTACT YOUR HEALTHCARE PROFESSIONAL.EXOFIN CARING FOR YOUR WOUND    AFTER exofin Fusion SKIN CLOSURE SYSTEM HAS BEEN APPLIED    YOUR HEALTHCARE PROFESSIONAL HAS CHOSEN TO USE exofin Fusion SKIN CLOSURE SYSTEM TO CLOSE YOUR WOUND.    exocin Fusion is the combination of a mesh and liquid adhesive that allows the incision or wound to be held together during the healing process.    exocin Fusion should remain in place until your healthcare professional; has determined that adequate healing has occurred, which is usually anywhere between 7 to 14 days. In most cases, exofin Fusion is easily removed with little or no discomfort.    In the event that you notice that exofin Fusion is beginning to loosen or may be coming off, contact your healthcare professional.    The following information is provided to help you understand how to care for your incision and is based on the FDA-cleared product labeling.    You should always follow the instructions of your healthcare provider.    THINGS TO KNOW    BATHING OR SHOWERING    If directed by your healthcare professional, you may occasionally and briefly wet your incision or wound that was treated with exofin Fusion in the shower or bath. Do not soak or scrub your incision or wound. Do  not swim or soak your incision or wound in water. After showering or bathing, gently blot your incision or wound dry with a soft towel. If a dry protective dressing is being used over exofin Fusion, it should be replaced with a fresh, dry protective dressing after showering or bathing as directed by your healthcare practitioner. Care should also be taken so that any tape that may be part of the dry protective dressing does not come into contact with exofin Fusion because when the tape is removed, it may also remove exofin Fusion.    WOUND HEALING  If you experience any redness, swelling, discomfort, warmth or pus, contact your healthcare professional and he or she will determine how your incision or wound is healing and take the necessary steps to address any issues.    EXERCISE  Do not engage in strenuous exercise that may cause additional stress on your incision or wound. Follow your healthcare professional's guidance about when you can return to your normal activities.    OINTMENTS OR LIQUIDS  Topical ointments, liquids or any other products (other than dry bandages) should not be applied to the incision while exofin Fusion is in place. These may loosen exofin Fusion from the skin before it has completely healed.    REMOVING exofin Fusion  Your healthcare professional will determine when the healing process of your incision or wound has been completed and exofin Fusion is ready to be removed, which is usually between 7 to 14 days. When healing is complete, your healthcare professional will carefully peel off the exofin Fusion.    Prior to removal, do not scratch, rub or pick at the mesh. This may loosen the adhesive and mesh before the skin is healed.  In the event that you notice the exofin Fusion is beginning to loosen and may be coming off or comes off with the skin/wound, contract your healthcare professional.    For complete directions on the use of exofin Fusion, please refer to instructions for Use (IFU)  included in the product packaging.  IF YOU HAVE ANY QUESTIONS OR CONCERNS ABOUT exofin Fusion PLEASE CONTACT YOUR HEALTHCARE PROFESSIONAL.EXOFIN CARING FOR YOUR WOUND    AFTER exofin Fusion SKIN CLOSURE SYSTEM HAS BEEN APPLIED    YOUR HEALTHCARE PROFESSIONAL HAS CHOSEN TO USE exofin Fusion SKIN CLOSURE SYSTEM TO CLOSE YOUR WOUND.    exocin Fusion is the combination of a mesh and liquid adhesive that allows the incision or wound to be held together during the healing process.    exocin Fusion should remain in place until your healthcare professional; has determined that adequate healing has occurred, which is usually anywhere between 7 to 14 days. In most cases, exofin Fusion is easily removed with little or no discomfort.    In the event that you notice that exofin Fusion is beginning to loosen or may be coming off, contact your healthcare professional.    The following information is provided to help you understand how to care for your incision and is based on the FDA-cleared product labeling.    You should always follow the instructions of your healthcare provider.    THINGS TO KNOW    BATHING OR SHOWERING    If directed by your healthcare professional, you may occasionally and briefly wet your incision or wound that was treated with exofin Fusion in the shower or bath. Do not soak or scrub your incision or wound. Do not swim or soak your incision or wound in water. After showering or bathing, gently blot your incision or wound dry with a soft towel. If a dry protective dressing is being used over exofin Fusion, it should be replaced with a fresh, dry protective dressing after showering or bathing as directed by your healthcare practitioner. Care should also be taken so that any tape that may be part of the dry protective dressing does not come into contact with exofin Fusion because when the tape is removed, it may also remove exofin Fusion.    WOUND HEALING  If you experience any redness, swelling, discomfort,  warmth or pus, contact your healthcare professional and he or she will determine how your incision or wound is healing and take the necessary steps to address any issues.    EXERCISE  Do not engage in strenuous exercise that may cause additional stress on your incision or wound. Follow your healthcare professional's guidance about when you can return to your normal activities.    OINTMENTS OR LIQUIDS  Topical ointments, liquids or any other products (other than dry bandages) should not be applied to the incision while exofin Fusion is in place. These may loosen exofin Fusion from the skin before it has completely healed.    REMOVING exofin Fusion  Your healthcare professional will determine when the healing process of your incision or wound has been completed and exofin Fusion is ready to be removed, which is usually between 7 to 14 days. When healing is complete, your healthcare professional will carefully peel off the exofin Fusion.    Prior to removal, do not scratch, rub or pick at the mesh. This may loosen the adhesive and mesh before the skin is healed.  In the event that you notice the exofin Fusion is beginning to loosen and may be coming off or comes off with the skin/wound, contract your healthcare professional.    For complete directions on the use of exofin Fusion, please refer to instructions for Use (IFU) included in the product packaging.  IF YOU HAVE ANY QUESTIONS OR CONCERNS ABOUT exofin Fusion PLEASE CONTACT YOUR HEALTHCARE PROFESSIONAL.[facility] COLD THERAPY - PATIENT INSTRUCTION SHEET    Cold Compression Therapy for your comfort and rehabilitation  Your caregivers want you to be productive in your rehab and comfortable during your stay. In keeping with those goals, you will be receiving an SMI Cold Therapy Wrap to help ease post-operative pain and swelling that might keep you from getting back on track! Your SMI Cold Therapy Wrap is effective and simple-to-use, and you will be encouraged to apply it  throughout your hospital stay and at home through the duration of your recovery.    When you are ready to go home  Be sure to take your SMI Cold Therapy Wrap and both sets of Gel Bags with you for continued comfort and use throughout your rehabilitation. If you don't already have them, ask your nurse or aide to retrieve your SMI Gel Bags from the patient freezer.    Home use precautions  Always follow your medical professional's application instructions upon discharge. Your SMI Cold Therapy Wrap and Gel Bags are designed to last for months following your surgery. Never heat the Gel Bags unless specified by your healthcare provider. Supervision is advised when using this product on children or geriatric patients. To avoid danger of suffocation, please keep the outer plastic packaging away from children & pets.    Cold Therapy Instructions  Place Gel Bags in a freezer set ¾ of the way to max temperature for at least (4) hours. For best results, lay the Gel Bags flat and [id number] in the freezer. Once frozen, slide Gel Bags into the gel pouch and secure your wrap to the affected area with the straps.  Gel wraps that have been stored in a freezer for an extended period of time may require a (10) minute period of softening up in a room temperature environment before application.  The gel pouch acts as a protective barrier. NEVER place frozen bags directly onto skin, as this may cause frostbite injury.  The SMI Cold Therapy Wrap is designed to be able to be worm while ambulating. The compression straps can be secured well enough so that the Wrap won't fall off while moving.  Wrap Application Videos can be viewed at [email or web address].  An additional protective barrier such as clothing, a washcloth, hand-towel or pillowcase may be used during prolonged treatment applications.  The Gel-Pouch and Wrap are both Latex-Free and the Gel Bag ingredients are non toxic.    SMI Wrap care instructions  The [facility] Cold Therapy  Wrap may be hand washed and hung to dry when needed.    [facility] re-order information  Additional [facility] body specific wraps and/or Gel Bags can be re-ordered from [email or web address] or call [email or web address] ([phone])

## 2024-09-06 NOTE — PLAN OF CARE
Problem: Adult Inpatient Plan of Care  Goal: Plan of Care Review  Outcome: Met  Goal: Patient-Specific Goal (Individualized)  Outcome: Met  Goal: Absence of Hospital-Acquired Illness or Injury  Outcome: Met  Intervention: Identify and Manage Fall Risk  Recent Flowsheet Documentation  Taken 9/6/2024 1429 by Yolanda Martínez, RN  Safety Promotion/Fall Prevention:   activity supervised   assistive device/personal items within reach   clutter free environment maintained   fall prevention program maintained   gait belt   toileting scheduled   safety round/check completed   room organization consistent   nonskid shoes/slippers when out of bed  Taken 9/6/2024 1215 by Yolanda Martínez, RN  Safety Promotion/Fall Prevention:   activity supervised   assistive device/personal items within reach   clutter free environment maintained   fall prevention program maintained   gait belt   toileting scheduled   safety round/check completed   room organization consistent   nonskid shoes/slippers when out of bed  Taken 9/6/2024 1040 by Yolanda Martínez, RN  Safety Promotion/Fall Prevention:   activity supervised   assistive device/personal items within reach   clutter free environment maintained   fall prevention program maintained   gait belt   toileting scheduled   safety round/check completed   room organization consistent   nonskid shoes/slippers when out of bed  Taken 9/6/2024 0730 by Yolanda Martínez, RN  Safety Promotion/Fall Prevention:   activity supervised   assistive device/personal items within reach   clutter free environment maintained   fall prevention program maintained   gait belt   toileting scheduled   safety round/check completed   room organization consistent   nonskid shoes/slippers when out of bed  Intervention: Prevent Skin Injury  Recent Flowsheet Documentation  Taken 9/6/2024 1429 by Yolanda Martínez, RN  Body Position: (up in chair) other (see comments)  Taken 9/6/2024 1215 by Yolanda Martínez, RN  Body  Position: (up in chair) other (see comments)  Taken 9/6/2024 1040 by Yolanda Martínez RN  Body Position: (up in chair) other (see comments)  Taken 9/6/2024 0730 by Yolanda Martínez RN  Body Position: dangle, side of bed  Intervention: Prevent and Manage VTE (Venous Thromboembolism) Risk  Recent Flowsheet Documentation  Taken 9/6/2024 1429 by Yolanda Martínez RN  VTE Prevention/Management:   bilateral   sequential compression devices off  Taken 9/6/2024 1215 by Yolanda Martínez RN  VTE Prevention/Management:   bilateral   sequential compression devices on  Taken 9/6/2024 1040 by Yolanda Martínez RN  VTE Prevention/Management:   bilateral   sequential compression devices on  Taken 9/6/2024 0730 by Yolanda Martínez RN  Activity Management: ambulated to bathroom  VTE Prevention/Management:   bilateral   sequential compression devices on  Intervention: Prevent Infection  Recent Flowsheet Documentation  Taken 9/6/2024 1429 by Yolanda Martínez RN  Infection Prevention: environmental surveillance performed  Taken 9/6/2024 1215 by Yolanda Martínez RN  Infection Prevention: environmental surveillance performed  Taken 9/6/2024 1040 by Yolanda Martínez RN  Infection Prevention: environmental surveillance performed  Taken 9/6/2024 0730 by Yolanda Martínez RN  Infection Prevention: environmental surveillance performed  Goal: Optimal Comfort and Wellbeing  Outcome: Met  Intervention: Provide Person-Centered Care  Recent Flowsheet Documentation  Taken 9/6/2024 1429 by Yolanda Martínez RN  Trust Relationship/Rapport: care explained  Taken 9/6/2024 1215 by Yolanda Martínez RN  Trust Relationship/Rapport: care explained  Taken 9/6/2024 1040 by Yolanda Martínez RN  Trust Relationship/Rapport:   care explained   choices provided  Taken 9/6/2024 0730 by Yolanda Martínez RN  Trust Relationship/Rapport:   care explained   choices provided   questions encouraged   questions answered   reassurance provided    thoughts/feelings acknowledged  Goal: Readiness for Transition of Care  Outcome: Met     Problem: Pain Acute  Goal: Acceptable Pain Control and Functional Ability  Outcome: Met  Intervention: Prevent or Manage Pain  Recent Flowsheet Documentation  Taken 9/6/2024 0730 by Yolanda Martínez RN  Medication Review/Management: medications reviewed  Intervention: Optimize Psychosocial Wellbeing  Recent Flowsheet Documentation  Taken 9/6/2024 1429 by Yolanda Martínez, RN  Diversional Activities: smartphone  Taken 9/6/2024 1215 by Yolanda Martínez RN  Diversional Activities: television  Taken 9/6/2024 1040 by Yolanda Martínez, RN  Diversional Activities: television  Taken 9/6/2024 0730 by Yolanda Martínez, RN  Diversional Activities: television     Problem: Mobility Impairment  Goal: Optimal Mobility  Outcome: Met  Intervention: Optimize Mobility  Recent Flowsheet Documentation  Taken 9/6/2024 1429 by Yolanda Martínez RN  Positioning/Transfer Devices:   pillows   in use  Taken 9/6/2024 1215 by Yolanda Martínez RN  Positioning/Transfer Devices:   pillows   in use  Taken 9/6/2024 1040 by Yolanda Martínez RN  Positioning/Transfer Devices:   pillows   in use  Taken 9/6/2024 0730 by Yolanda Martínez, RN  Activity Management: ambulated to bathroom  Assistive Device Utilized:   gait belt   walker  Positioning/Transfer Devices:   pillows   in use     Problem: Fall Injury Risk  Goal: Absence of Fall and Fall-Related Injury  Outcome: Met  Intervention: Identify and Manage Contributors  Recent Flowsheet Documentation  Taken 9/6/2024 0730 by Yolanda Martínez, RN  Medication Review/Management: medications reviewed  Intervention: Promote Injury-Free Environment  Recent Flowsheet Documentation  Taken 9/6/2024 1429 by Yolanda Martínez, RN  Safety Promotion/Fall Prevention:   activity supervised   assistive device/personal items within reach   clutter free environment maintained   fall prevention program maintained   gait  belt   toileting scheduled   safety round/check completed   room organization consistent   nonskid shoes/slippers when out of bed  Taken 9/6/2024 1215 by Yolanda Martínez, RN  Safety Promotion/Fall Prevention:   activity supervised   assistive device/personal items within reach   clutter free environment maintained   fall prevention program maintained   gait belt   toileting scheduled   safety round/check completed   room organization consistent   nonskid shoes/slippers when out of bed  Taken 9/6/2024 1040 by Yolanda Martínez, RN  Safety Promotion/Fall Prevention:   activity supervised   assistive device/personal items within reach   clutter free environment maintained   fall prevention program maintained   gait belt   toileting scheduled   safety round/check completed   room organization consistent   nonskid shoes/slippers when out of bed  Taken 9/6/2024 0730 by Yolanda Martínez, RN  Safety Promotion/Fall Prevention:   activity supervised   assistive device/personal items within reach   clutter free environment maintained   fall prevention program maintained   gait belt   toileting scheduled   safety round/check completed   room organization consistent   nonskid shoes/slippers when out of bed     Problem: Adjustment to Surgery (Hip Arthroplasty)  Goal: Optimal Coping  Outcome: Met     Problem: Bleeding (Hip Arthroplasty)  Goal: Absence of Bleeding  Outcome: Met     Problem: Bowel Motility Impaired (Hip Arthroplasty)  Goal: Effective Bowel Elimination  Outcome: Met     Problem: Fluid and Electrolyte Imbalance (Hip Arthroplasty)  Goal: Fluid and Electrolyte Balance  Outcome: Met     Problem: Functional Ability Impaired (Hip Arthroplasty)  Goal: Optimal Functional Ability  Outcome: Met  Intervention: Promote Optimal Functional Status  Recent Flowsheet Documentation  Taken 9/6/2024 0730 by Yolanda Martínez, RN  Activity Management: ambulated to bathroom  Assistive Device Utilized:   gait belt   walker     Problem:  Infection (Hip Arthroplasty)  Goal: Absence of Infection Signs and Symptoms  Outcome: Met     Problem: Neurovascular Compromise (Hip Arthroplasty)  Goal: Intact Neurovascular Status  Outcome: Met     Problem: Ongoing Anesthesia Effects (Hip Arthroplasty)  Goal: Anesthesia/Sedation Recovery  Outcome: Met  Intervention: Optimize Anesthesia Recovery  Recent Flowsheet Documentation  Taken 9/6/2024 1429 by Yolanda Martínez, RN  Safety Promotion/Fall Prevention:   activity supervised   assistive device/personal items within reach   clutter free environment maintained   fall prevention program maintained   gait belt   toileting scheduled   safety round/check completed   room organization consistent   nonskid shoes/slippers when out of bed  Taken 9/6/2024 1215 by Yolanda Martínez, RN  Safety Promotion/Fall Prevention:   activity supervised   assistive device/personal items within reach   clutter free environment maintained   fall prevention program maintained   gait belt   toileting scheduled   safety round/check completed   room organization consistent   nonskid shoes/slippers when out of bed  Taken 9/6/2024 1040 by Yolanda Martínez, RN  Safety Promotion/Fall Prevention:   activity supervised   assistive device/personal items within reach   clutter free environment maintained   fall prevention program maintained   gait belt   toileting scheduled   safety round/check completed   room organization consistent   nonskid shoes/slippers when out of bed  Taken 9/6/2024 0730 by Yolanda Martínez, RN  Safety Promotion/Fall Prevention:   activity supervised   assistive device/personal items within reach   clutter free environment maintained   fall prevention program maintained   gait belt   toileting scheduled   safety round/check completed   room organization consistent   nonskid shoes/slippers when out of bed     Problem: Pain (Hip Arthroplasty)  Goal: Acceptable Pain Control  Outcome: Met  Intervention: Prevent or Manage  Pain  Recent Flowsheet Documentation  Taken 9/6/2024 1429 by Yolanda Martínez RN  Diversional Activities: smartphone  Taken 9/6/2024 1215 by Yolanda Martínez RN  Diversional Activities: television  Taken 9/6/2024 1040 by Yolanda Martínez RN  Diversional Activities: television  Taken 9/6/2024 0730 by Yolanda Martínez, RN  Diversional Activities: television     Problem: Postoperative Nausea and Vomiting (Hip Arthroplasty)  Goal: Nausea and Vomiting Relief  Outcome: Met     Problem: Postoperative Urinary Retention (Hip Arthroplasty)  Goal: Effective Urinary Elimination  Outcome: Met     Problem: Respiratory Compromise (Hip Arthroplasty)  Goal: Effective Oxygenation and Ventilation  Outcome: Met  Intervention: Optimize Oxygenation and Ventilation  Recent Flowsheet Documentation  Taken 9/6/2024 1429 by Yolanda Martínez RN  Head of Bed (HOB) Positioning: HOB at 60 degrees  Taken 9/6/2024 1215 by Yolanda Martínez RN  Head of Bed (HOB) Positioning: HOB at 60 degrees  Taken 9/6/2024 1040 by Yolanda Martínez RN  Head of Bed (HOB) Positioning: HOB at 60 degrees  Taken 9/6/2024 0730 by Yolanda Martínez RN  Head of Bed (HOB) Positioning: HOB at 45 degrees     Problem: Acute Confusion (Hospitalized Older Adult)  Goal: Optimal Cognitive Function  Outcome: Met     Problem: Bowel Elimination Impaired (Hospitalized Older Adult)  Goal: Effective Bowel Elimination  Outcome: Met     Problem: Depression (Hospitalized Older Adult)  Goal: Depressive Symptoms Identified and Managed  Outcome: Met  Intervention: Monitor and Manage Depressive Symptoms  Recent Flowsheet Documentation  Taken 9/6/2024 1429 by Yolanda Martínez RN  Family/Support System Care: support provided  Taken 9/6/2024 1215 by Yolanda Martínez RN  Family/Support System Care: support provided  Taken 9/6/2024 1040 by Yolanda Martínez, RN  Family/Support System Care: support provided  Taken 9/6/2024 0730 by Yolanda Martínez, RN  Family/Support System Care:    self-care encouraged   support provided     Problem: Fluid Imbalance (Hospitalized Older Adult)  Goal: Fluid Balance  Outcome: Met     Problem: Functional Ability Impaired (Hospitalized Older Adult)  Goal: Optimal Functional Ability  Outcome: Met  Intervention: Promote Functional Ability  Recent Flowsheet Documentation  Taken 9/6/2024 0933 by Yolanda Martínez, RN  Activity Assistance Provided: assistance, stand-by     Problem: Oral Intake Inadequate (Hospitalized Older Adult)  Goal: Optimal Nutrition Intake  Outcome: Met     Problem: Sleep Disturbance (Hospitalized Older Adult)  Goal: Adequate Sleep/Rest  Outcome: Met     Problem: Urinary Incontinence (Hospitalized Older Adult)  Goal: Urinary Incontinence Managed  Outcome: Met   Goal Outcome Evaluation:

## 2024-09-06 NOTE — CASE MANAGEMENT/SOCIAL WORK
Discharge Planning Assessment  [facility]     Patient Name: Mar Joseph  MRN: [id number]  Today's Date: 9/6/2024    Admit Date: 9/5/2024    Plan: home/   Discharge Needs Assessment    No documentation.                  Discharge Plan       Row Name 09/06/24 1338       Plan    Plan home/    Patient/Family in Agreement with Plan yes    Plan Comments Outpatient status: I spoke with patient and  at the , patient lives in [place] with spouse. Independent, no HH, no home 02 and has walkers and elevated TS in the home if needed. OP PT set up thru MD office with [facility] off [address]. Plan is home at discharge and has transportation.    Final Discharge Disposition Code 01 - home or self-care                  Continued Care and Services - Admitted Since 9/5/2024    No active coordination exists for this encounter.       Expected Discharge Date and Time       Expected Discharge Date Expected Discharge Time    Sep 7, 2024            Demographic Summary    No documentation.                  Functional Status    No documentation.                  Psychosocial    No documentation.                  Abuse/Neglect    No documentation.                  Legal    No documentation.                  Substance Abuse    No documentation.                  Patient Forms    No documentation.                     Amber Jaquez RN
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
## 2024-09-06 NOTE — PLAN OF CARE
Goal Outcome Evaluation:  Plan of Care Reviewed With: patient, spouse        Progress: no change  Outcome Evaluation: Pt presents with impaired balance, ADLs, and mobility warranting skilled OT services. Issued and provided education on AE for LB ADLs. Pt demo'd understanding with all tasks. Rec home with assist at dc. No further OT services warranted at this time. OT will dc.      Anticipated Discharge Disposition (OT): home with assist

## 2024-09-06 NOTE — PLAN OF CARE
Goal Outcome Evaluation:           Progress: improving     VSS, on RA. Voiding well. Slept some. Ambulated in hernández and room. Dressing is CDI. Pain controlled with PO meds.

## 2024-09-06 NOTE — PROGRESS NOTES
[facility] Progress Note    Subjective      LOS: 0 days   Patient Care Team:  Anand Pineda DO as PCP - General (Family Medicine)  Isiah Garcia DO as Consulting Physician (Cardiology)  Kirk Ocasio III, MD as Cardiologist (Cardiology)  Artur Figueredo PA as Physician Assistant (Cardiology)    CC: Left hip pain    Interval History:   Patient up walking to the bathroom on the walker, doing well.  No complaints.    Objective      Vital Signs  Temp (24hrs), Av.6 °F (36.4 °C), Min:96.5 °F (35.8 °C), Max:98.6 °F (37 °C)      /76   Pulse 77   Temp 98.6 °F (37 °C) (Oral)   Resp 18   LMP  (LMP Unknown)   SpO2 95%     Examination:   Examination of the left hip: The wound is clean, dry, and intact.  Knee flexion, knee extension, ankle dorsiflexion, ankle plantar flexion, and EHL are intact.  Sensation intact in the foot to light touch.   Thigh is soft and nontender.      Labs:        Radiology:  Imaging Results (Last 24 Hours)       Procedure Component Value Units Date/Time    XR Hip With or Without Pelvis 2 - 3 View Left [[id number]] Collected: 24     Updated: 24    Narrative:      XR HIP W OR WO PELVIS 2-3 VIEW LEFT    Date of Exam: 2024 3:40 PM EDT    Indication: post-op left JAMI    Comparison: None available.    Findings: 3 films. There is a left hip arthroplasty in normal anatomic alignment. There are no fractures or hardware complications. The right hip is included on one of the AP views. There is moderately severe right hip joint narrowing with osteophytosis   at the base of the right femoral head.      Impression:      Impression:  Normal-appearing left hip arthroplasty. Moderately severe degenerative change noted of the right hip.      Electronically Signed: Miya Rene MD    2024 3:55 PM EDT    Workstation ID: [id number]    FL C Arm During Surgery [[id number]] Resulted: 24     Updated: 24    Narrative:      This  procedure was auto-finalized with no dictation required.            PT:  Physical Therapy - Plan of Care Review - Outcome Summary:  Outcome Evaluation: PATIENT DEMONSTRATED GOOD CONTROL OF WALKER IN HALLWAY.  RECOMMEND SKILLED PT TOMORROW AND HOME WITH OUTPATIENT PT. (09/05/24 [id number])]       Results Review:     I reviewed the patient's new clinical results.    Assessment and Plan     Assessment:   1 Day Post-Op status post left total hip arthroplasty      Status post total replacement of left hip    Paroxysmal atrial fibrillation    Anxiety    Chronic anticoagulation    Degenerative arthritis of hip      Plan for disposition: Plan for discharge home most likely today, as long as she is cleared medically and by physical therapy.  Follow-up in 3 weeks as planned.      Future Appointments   Date Time Provider Department Center   9/23/2024 11:00 AM Anand Pineda DO [facility]   9/25/2024 11:20 AM Maryjane Martins PA-C [facility]   1/29/2025 11:15 AM Kirk Ocasio III, MD [facility]   3/24/2025  1:15 PM Isiah Garcia DO [facility]           Tommie Metcalf MD  09/06/24  09:12 EDT

## 2024-09-06 NOTE — PLAN OF CARE
Goal Outcome Evaluation:  Plan of Care Reviewed With: patient        Progress: improving  Outcome Evaluation: Patient able to ambulate safely with walker on rueda. She demonstrated safe mobility on stairs. She is going home with her spouse and will recieve outpatient PT      Anticipated Discharge Disposition (PT): home with assist, home with outpatient therapy services
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
## 2024-09-25 ENCOUNTER — OFFICE VISIT (OUTPATIENT)
Dept: ORTHOPEDIC SURGERY | Facility: CLINIC | Age: 74
End: 2024-09-25
Payer: MEDICARE

## 2024-09-25 VITALS — TEMPERATURE: 97.3 F

## 2024-09-25 DIAGNOSIS — Z96.642 S/P TOTAL LEFT HIP ARTHROPLASTY: Primary | ICD-10-CM

## 2024-09-25 PROCEDURE — 1160F RVW MEDS BY RX/DR IN RCRD: CPT | Performed by: PHYSICIAN ASSISTANT

## 2024-09-25 PROCEDURE — 1159F MED LIST DOCD IN RCRD: CPT | Performed by: PHYSICIAN ASSISTANT

## 2024-09-25 PROCEDURE — 99024 POSTOP FOLLOW-UP VISIT: CPT | Performed by: PHYSICIAN ASSISTANT

## 2024-09-25 RX ORDER — SENNOSIDES 8.6 MG
650 CAPSULE ORAL EVERY 6 HOURS PRN
COMMUNITY

## 2024-09-25 RX ORDER — TRAMADOL HYDROCHLORIDE 50 MG/1
TABLET ORAL
COMMUNITY
Start: 2024-09-23

## 2024-10-07 ENCOUNTER — OFFICE VISIT (OUTPATIENT)
Dept: FAMILY MEDICINE CLINIC | Facility: CLINIC | Age: 74
End: 2024-10-07
Payer: MEDICARE

## 2024-10-07 VITALS
DIASTOLIC BLOOD PRESSURE: 60 MMHG | RESPIRATION RATE: 17 BRPM | SYSTOLIC BLOOD PRESSURE: 112 MMHG | WEIGHT: 116 LBS | BODY MASS INDEX: 19.81 KG/M2 | HEART RATE: 72 BPM | HEIGHT: 64 IN | OXYGEN SATURATION: 98 %

## 2024-10-07 DIAGNOSIS — Z23 IMMUNIZATION DUE: ICD-10-CM

## 2024-10-07 DIAGNOSIS — Z12.31 ENCOUNTER FOR SCREENING MAMMOGRAM FOR MALIGNANT NEOPLASM OF BREAST: ICD-10-CM

## 2024-10-07 DIAGNOSIS — F32.A DEPRESSION, UNSPECIFIED DEPRESSION TYPE: ICD-10-CM

## 2024-10-07 DIAGNOSIS — Z13.820 SCREENING FOR OSTEOPOROSIS: ICD-10-CM

## 2024-10-07 DIAGNOSIS — Z00.00 MEDICARE ANNUAL WELLNESS VISIT, SUBSEQUENT: Primary | ICD-10-CM

## 2024-10-07 DIAGNOSIS — Z78.0 ASYMPTOMATIC AGE-RELATED POSTMENOPAUSAL STATE: ICD-10-CM

## 2024-10-07 PROCEDURE — 90677 PCV20 VACCINE IM: CPT | Performed by: FAMILY MEDICINE

## 2024-10-07 PROCEDURE — G0009 ADMIN PNEUMOCOCCAL VACCINE: HCPCS | Performed by: FAMILY MEDICINE

## 2024-10-07 PROCEDURE — G0008 ADMIN INFLUENZA VIRUS VAC: HCPCS | Performed by: FAMILY MEDICINE

## 2024-10-07 PROCEDURE — 90662 IIV NO PRSV INCREASED AG IM: CPT | Performed by: FAMILY MEDICINE

## 2024-10-07 PROCEDURE — G0439 PPPS, SUBSEQ VISIT: HCPCS | Performed by: FAMILY MEDICINE

## 2024-10-07 PROCEDURE — 1125F AMNT PAIN NOTED PAIN PRSNT: CPT | Performed by: FAMILY MEDICINE

## 2024-10-07 RX ORDER — FLUOXETINE 40 MG/1
40 CAPSULE ORAL DAILY
Qty: 90 CAPSULE | Refills: 3 | Status: SHIPPED | OUTPATIENT
Start: 2024-10-07

## 2024-10-07 NOTE — LETTER
[facility]  Vaccine Consent Form    Patient Name:  Mar Joseph  Patient :  1950     Vaccine(s) Ordered    Fluzone High-Dose 65+yrs ([id number])  Pneumococcal Conjugate Vaccine 20-Valent All        Screening Checklist  The following questions should be completed prior to vaccination. If you answer “yes” to any question, it does not necessarily mean you should not be vaccinated. It just means we may need to clarify or ask more questions. If a question is unclear, please ask your healthcare provider to explain it.    Yes No   Any fever or moderate to severe illness today (mild illness and/or antibiotic treatment are not contraindications)?     Do you have a history of a serious reaction to any previous vaccinations, such as anaphylaxis, encephalopathy within 7 days, Guillain-[place] syndrome within 6 weeks, seizure?     Have you received any live vaccine(s) (e.g MMR, JANE) or any other vaccines in the last month (to ensure duplicate doses aren't given)?     Do you have an anaphylactic allergy to latex (DTaP, DTaP-IPV, Hep A, Hep B, MenB, RV, Td, Tdap), baker’s yeast (Hep B, HPV), polysorbates (RSV, nirsevimab, PCV 20, Rotavirrus, Tdap, Shingrix), or gelatin (JANE, MMR)?     Do you have an anaphylactic allergy to neomycin (Rabies, JANE, MMR, IPV, Hep A), polymyxin B (IPV), or streptomycin (IPV)?      Any cancer, leukemia, AIDS, or other immune system disorder? (JANE, MMR, RV)     Do you have a parent, brother, or sister with an immune system problem (if immune competence of vaccine recipient clinically verified, can proceed)? (MMR, JANE)     Any recent steroid treatments for >2 weeks, chemotherapy, or radiation treatment? (JANE, MMR)     Have you received antibody-containing blood transfusions or IVIG in the past 11 months (recommended interval is dependent on product)? (MMR, JANE)     Have you taken antiviral drugs (acyclovir, famciclovir, valacyclovir for JANE) in the last 24 or 48 hours, respectively?      Are  "you pregnant or planning to become pregnant within 1 month? (JANE, MMR, HPV, IPV, MenB, Abrexvy; For Hep B- refer to Engerix-B; For RSV - Abrysvo is indicated for 32-36 weeks of pregnancy from September to January)     For infants, have you ever been told your child has had intussusception or a medical emergency involving obstruction of the intestine (Rotavirus)? If not for an infant, can skip this question.         *Ordering Physicians/APC should be consulted if \"yes\" is checked by the patient or guardian above.  I have received, read, and understand the Vaccine Information Statement (VIS) for each vaccine ordered.  I have considered my or my child's health status as well as the health status of my close contacts.  I have taken the opportunity to discuss my vaccine questions with my or my child's health care provider.   I have requested that the ordered vaccine(s) be given to me or my child.  I understand the benefits and risks of the vaccines.  I understand that I should remain in the clinic for 15 minutes after receiving the vaccine(s).  _________________________________________________________  Signature of Patient or Parent/Legal Guardian ____________________  Date     "

## 2024-10-07 NOTE — PATIENT INSTRUCTIONS
Advance Care Planning and Advance Directives     You make decisions on a daily basis - decisions about where you want to live, your career, your home, your life. Perhaps one of the most important decisions you face is your choice for future medical care. Take time to talk with your family and your healthcare team and start planning today.  Advance Care Planning is a process that can help you:  Understand possible future healthcare decisions in light of your own experiences  Reflect on those decision in light of your goals and values  Discuss your decisions with those closest to you and the healthcare professionals that care for you  Make a plan by creating a document that reflects your wishes    Surrogate Decision Maker  In the event of a medical emergency, which has left you unable to communicate or to make your own decisions, you would need someone to make decisions for you.  It is important to discuss your preferences for medical treatment with this person while you are in good health.     Qualities of a surrogate decision maker:  Willing to take on this role and responsibility  Knows what you want for future medical care  Willing to follow your wishes even if they don't agree with them  Able to make difficult medical decisions under stressful circumstances    Advance Directives  These are legal documents you can create that will guide your healthcare team and decision maker(s) when needed. These documents can be stored in the electronic medical record.    Living Will - a legal document to guide your care if you have a terminal condition or a serious illness and are unable to communicate. States vary by statute in document names/types, but most forms may include one or more of the following:        -  Directions regarding life-prolonging treatments        -  Directions regarding artificially provided nutrition/hydration        -  Choosing a healthcare decision maker        -  Direction regarding organ/tissue  donation    Durable Power of  for Healthcare - this document names an -in-fact to make medical decisions for you, but it may also allow this person to make personal and financial decisions for you. Please seek the advice of an  if you need this type of document.    **Advance Directives are not required and no one may discriminate against you if you do not sign one.    Medical Orders  Many states allow specific forms/orders signed by your physician to record your wishes for medical treatment in your current state of health. This form, signed in personal communication with your physician, addresses resuscitation and other medical interventions that you may or may not want.      For more information or to schedule a time with a [facility] Advance Care Planning Facilitator contact: [facility] or call [phone] and someone will contact you directly.  You are due for Shingrix vaccination series ( the newest shingles vaccine).  It is a two shot series spaced 2-6 months apart. Please get this vaccine series started at your earliest convenience at your local pharmacy to help avoid shingles outbreak. It is more effective than the old Zostavax vaccine and is recommended even if you have had the Zostavax vaccine in the past.  Once the Shingrix series is completed, it does not need to be repeated.   For more information, please look at the website below:  https://www.cdc.gov/vaccines/vpd/shingles/public/shingrix/index.html    You are due for a Covid 19 vaccination. (provides protection against Covid 19 Viral Infection) Please  talk to your pharmacist and get the immunization at your local pharmacy at your earliest convenience. Please click on the link for more information about this vaccine.   https://www.cdc.gov/coronavirus/2019-ncov/vaccines/stay-up-to-date.html        Medicare Wellness  Personal Prevention Plan of Service     Date of Office Visit:    Encounter Provider:  Anand Jamil  DO Edwin  Place of Service:  [facility] PRIMARY CARE  Patient Name: Mar Joseph  :  1950    As part of the Medicare Wellness portion of your visit today, we are providing you with this personalized preventive plan of services (PPPS). This plan is based upon recommendations of the [place] Preventive Services Task Force (USPSTF) and the Advisory Committee on Immunization Practices (ACIP).    This lists the preventive care services that should be considered, and provides dates of when you are due. Items listed as completed are up-to-date and do not require any further intervention.    Health Maintenance   Topic Date Due   • DXA SCAN  Never done   • ZOSTER VACCINE (1 of 2) Never done   • Pneumococcal Vaccine 65+ (2 of 2 - PCV) 2021   • MAMMOGRAM  11/10/2022   • INFLUENZA VACCINE  2024   • COVID-19 Vaccine ( season) 2024   • ANNUAL WELLNESS VISIT  2024   • LIPID PANEL  2024   • TDAP/TD VACCINES (2 - Td or Tdap) 2025   • BMI FOLLOWUP  2025   • COLORECTAL CANCER SCREENING  08/10/2033   • HEPATITIS C SCREENING  Completed       Orders Placed This Encounter   Procedures   • Mammo Screening Digital Tomosynthesis Bilateral With CAD     Standing Status:   Future     Standing Expiration Date:   10/7/2025     Order Specific Question:   Reason for Exam:     Answer:   screening mammogram     Order Specific Question:   Release to patient     Answer:   Routine Release [[id number]]   • DEXA Bone Density Axial     Standing Status:   Future     Standing Expiration Date:   10/7/2025     Order Specific Question:   Is patient taking or have taken long term Glucocorticoid (steroids)?     Answer:   No     Order Specific Question:   Does the patient have rheumatoid arthritis?     Answer:   No     Order Specific Question:   Reason for Exam:     Answer:   osteoporosis screening     Order Specific Question:   Release to patient     Answer:   Routine Release  [[id number]]     Order Specific Question:   Does the patient have secondary osteoporosis?     Answer:   No   • Fluzone High-Dose 65+yrs ([id number])   • Pneumococcal Conjugate Vaccine 20-Valent All       No follow-ups on file.

## 2024-10-07 NOTE — PROGRESS NOTES
Subjective   The ABCs of the Annual Wellness Visit  Medicare Wellness Visit      Mar Joseph is a 74 y.o. patient who presents for a Medicare Wellness Visit.    The following portions of the patient's history were reviewed and   updated as appropriate: allergies, current medications, past family history, past medical history, past social history, past surgical history, and problem list.    Compared to one year ago, the patient's physical   health is the same.  Compared to one year ago, the patient's mental   health is the same.    Recent Hospitalizations:  She was not admitted to the hospital during the last year.     Current Medical Providers:  Patient Care Team:  Anand Pineda DO as PCP - General (Family Medicine)  Isiah Garcia DO as Consulting Physician (Cardiology)  Kirk Ocasio III, MD as Cardiologist (Cardiology)  Artur Figueredo PA as Physician Assistant (Cardiology)  Maryjane Martins PA-C as Physician Assistant (Physician Assistant)    Outpatient Medications Prior to Visit   Medication Sig Dispense Refill    acetaminophen (Tylenol 8 Hour) 650 MG 8 hr tablet Take 1 tablet by mouth Every 6 (Six) Hours As Needed for Mild Pain.      albuterol sulfate  (90 Base) MCG/ACT inhaler Inhale 2 puffs 2 (Two) Times a Day. 8 g 1    apixaban (Eliquis) 5 MG tablet tablet Take 1 tablet by mouth 2 (Two) Times a Day. 180 tablet 3    gabapentin (NEURONTIN) 400 MG capsule Take  by mouth 2 (Two) Times a Day.      multivitamin with minerals (MULTIVITAMIN ADULTS 50+ PO) Take 1 tablet by mouth 2 (Two) Times a Day.      sotalol (Betapace) 80 MG tablet Take 1 tablet by mouth 2 (Two) Times a Day. 180 tablet 3    traMADol (ULTRAM) 50 MG tablet       travoprost, BAK free, (TRAVATAN) 0.004 % solution ophthalmic solution travoprost 0.004 % eye drops   Instill by ophthalmic route.      FLUoxetine (PROzac) 40 MG capsule Take 1 capsule by mouth Daily. 90 capsule 0     No facility-administered medications prior  "to visit.     Opioid medication/s are on active medication list.  and I have evaluated her active treatment plan and pain score trends (see table).  Vitals:    10/07/24 1340   PainSc:   5   PainLoc: Back     I have reviewed the chart for potential of high risk medication and harmful drug interactions in the elderly.        Aspirin is not on active medication list.  Aspirin use is not indicated based on review of current medical condition/s. Risk of harm outweighs potential benefits.  .    Patient Active Problem List   Diagnosis    Paroxysmal atrial fibrillation    Tobacco use    Chronic colitis    Mixed hyperlipidemia    Anxiety    Chronic anticoagulation    Long term current use of antiarrhythmic drug    Spinal stenosis of lumbar region    Degenerative arthritis of hip    Status post total replacement of left hip     Advance Care Planning Advance Directive is not on file.  ACP discussion was held with the patient during this visit. Patient has an advance directive (not in EMR), copy requested.            Objective   Vitals:    10/07/24 1340   BP: 112/60   BP Location: Left arm   Patient Position: Sitting   Cuff Size: Adult   Pulse: 72   Resp: 17   SpO2: 98%   Weight: 52.6 kg (116 lb)   Height: 162.6 cm (64\")   PainSc:   5   PainLoc: Back       Estimated body mass index is 19.91 kg/m² as calculated from the following:    Height as of this encounter: 162.6 cm (64\").    Weight as of this encounter: 52.6 kg (116 lb).    BMI is within normal parameters. No other follow-up for BMI required.       Does the patient have evidence of cognitive impairment? No  Lab Results   Component Value Date    HGBA1C 5.00 08/22/2024                                                                                               Health  Risk Assessment    Smoking Status:  Social History     Tobacco Use   Smoking Status Former    Current packs/day: 0.25    Average packs/day: 0.3 packs/day for 67.9 years (17.0 ttl pk-yrs)    Types: Cigarettes "    Start date: 1/1/1972    Passive exposure: Never   Smokeless Tobacco Never     Alcohol Consumption:  Social History     Substance and Sexual Activity   Alcohol Use Yes    Alcohol/week: 2.0 standard drinks of alcohol    Types: 1 Glasses of wine, 1 Cans of beer per week    Comment: WEEKLY       Fall Risk Screen  SALVADOR Fall Risk Assessment was completed, and patient is at LOW risk for falls.Assessment completed on:10/7/2024    Depression Screening:      10/7/2024     1:39 PM   PHQ-2/PHQ-9 Depression Screening   Retired Little Interest or Pleasure in Doing Things 0-->not at all   Retired Feeling Down, Depressed or Hopeless 0-->not at all   Retired PHQ-9: Brief Depression Severity Measure Score 0     Health Habits and Functional and Cognitive Screening:      10/7/2024     1:39 PM   Functional & Cognitive Status   Do you have difficulty preparing food and eating? No   Do you have difficulty bathing yourself, getting dressed or grooming yourself? No   Do you have difficulty using the toilet? No   Do you have difficulty moving around from place to place? No   Do you have trouble with steps or getting out of a bed or a chair? No   Current Diet Well Balanced Diet   Dental Exam Up to date   Eye Exam Up to date   Exercise (times per week) 7 times per week   Current Exercises Include Light Weights   Do you need help using the phone?  No   Are you deaf or do you have serious difficulty hearing?  No   Do you need help to go to places out of walking distance? No   Do you need help shopping? No   Do you need help preparing meals?  No   Do you need help with housework?  No   Do you need help with laundry? No   Do you need help taking your medications? No   Do you need help managing money? No   Do you ever drive or ride in a car without wearing a seat belt? No   Have you felt unusual stress, anger or loneliness in the last month? No   Who do you live with? Spouse   If you need help, do you have trouble finding someone available to  you? No   Have you been bothered in the last four weeks by sexual problems? No   Do you have difficulty concentrating, remembering or making decisions? No           Age-appropriate Screening Schedule:  Refer to the list below for future screening recommendations based on patient's age, sex and/or medical conditions. Orders for these recommended tests are listed in the plan section. The patient has been provided with a written plan.    Health Maintenance List  Health Maintenance   Topic Date Due    DXA SCAN  Never done    ZOSTER VACCINE (1 of 2) Never done    MAMMOGRAM  11/10/2022    COVID-19 Vaccine (6 - 2023-24 season) 09/01/2024    ANNUAL WELLNESS VISIT  09/21/2024    LIPID PANEL  09/21/2024    TDAP/TD VACCINES (2 - Td or Tdap) 02/02/2025    COLORECTAL CANCER SCREENING  08/10/2033    HEPATITIS C SCREENING  Completed    INFLUENZA VACCINE  Completed    Pneumococcal Vaccine 65+  Completed                                                                                                                                                CMS Preventative Services Quick Reference  Risk Factors Identified During Encounter  None Identified    The above risks/problems have been discussed with the patient.  Pertinent information has been shared with the patient in the After Visit Summary.  An After Visit Summary and PPPS were made available to the patient.    Follow Up:   Next Medicare Wellness visit to be scheduled in 1 year.     Assessment & Plan  Medicare annual wellness visit, subsequent    Encounter for screening mammogram for malignant neoplasm of breast    Asymptomatic age-related postmenopausal state    Screening for osteoporosis    Depression, unspecified depression type    Immunization due      Orders Placed This Encounter   Procedures    Mammo Screening Digital Tomosynthesis Bilateral With CAD    DEXA Bone Density Axial    Fluzone High-Dose 65+yrs (2088-4409)    Pneumococcal Conjugate Vaccine 20-Valent All     New  Medications Ordered This Visit   Medications    FLUoxetine (PROzac) 40 MG capsule     Sig: Take 1 capsule by mouth Daily.     Dispense:  90 capsule     Refill:  3        Problem List Items Addressed This Visit    None  Visit Diagnoses       Medicare annual wellness visit, subsequent    -  Primary    Encounter for screening mammogram for malignant neoplasm of breast        Relevant Orders    Mammo Screening Digital Tomosynthesis Bilateral With CAD    Asymptomatic age-related postmenopausal state        Relevant Orders    DEXA Bone Density Axial    Screening for osteoporosis        Relevant Orders    DEXA Bone Density Axial    Depression, unspecified depression type        Relevant Medications    FLUoxetine (PROzac) 40 MG capsule    Immunization due        Relevant Orders    Fluzone High-Dose 65+yrs (2495-2455) (Completed)    Pneumococcal Conjugate Vaccine 20-Valent All (Completed)            The wellness exam has been reviewed in detail.  The patient has been fully counseled on preventative guidelines for vaccines, cancer screenings, and other health maintenance needs.  Functional testing has been performed to assess capacity for independent living and need for other medical interventions.   The patient was counseled on maintaining a lifestyle to promote good health and to minimize chronic diseases.  The patient has been assisted with scheduling healthcare procedures for the coming year and given a written document outlining these recommendations.    Return in about 1 year (around 10/7/2025) for Medicare Wellness.      Follow Up:   Return in about 1 year (around 10/7/2025) for Medicare Wellness.

## 2024-10-17 ENCOUNTER — TELEPHONE (OUTPATIENT)
Dept: FAMILY MEDICINE CLINIC | Facility: CLINIC | Age: 74
End: 2024-10-17

## 2024-10-17 NOTE — TELEPHONE ENCOUNTER
Caller: Mar Joseph    Relationship: Self    Best call back number: [phone]     Requested Prescriptions:   ANTIBIOTIC        Pharmacy where request should be sent: [facility] [id number] [place] [id number] - [phone]  - [phone] FX     Last office visit with prescribing clinician: 10/7/2024   Last telemedicine visit with prescribing clinician: Visit date not found   Next office visit with prescribing clinician: 10/9/2025     Additional details provided by patient: PATIENT IS HAVING HER TEETH CLEANED ON 10/23/24 AND NEEDS AN ANTIBIOTIC SENT TO THE PHARMACY. PATIENT IS ON BLOOD THINNER MEDICATION     Does the patient have less than a 3 day supply:  [x] Yes      Would you like a call back once the refill request has been completed: [] Yes [x] No    If the office needs to give you a call back, can they leave a voicemail: [] Yes [x] No    Nicolasa Kulkarni MA   10/17/24 10:09 EDT

## 2024-11-04 ENCOUNTER — OFFICE VISIT (OUTPATIENT)
Dept: ORTHOPEDIC SURGERY | Facility: CLINIC | Age: 74
End: 2024-11-04
Payer: MEDICARE

## 2024-11-04 DIAGNOSIS — Z96.642 S/P TOTAL LEFT HIP ARTHROPLASTY: Primary | ICD-10-CM

## 2024-11-04 DIAGNOSIS — Z47.89 ORTHOPEDIC AFTERCARE: ICD-10-CM

## 2024-11-04 PROCEDURE — 1159F MED LIST DOCD IN RCRD: CPT | Performed by: ORTHOPAEDIC SURGERY

## 2024-11-04 PROCEDURE — 1160F RVW MEDS BY RX/DR IN RCRD: CPT | Performed by: ORTHOPAEDIC SURGERY

## 2024-11-04 PROCEDURE — 99024 POSTOP FOLLOW-UP VISIT: CPT | Performed by: ORTHOPAEDIC SURGERY
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
## 2024-11-28 ENCOUNTER — PATIENT MESSAGE (OUTPATIENT)
Dept: ORTHOPEDIC SURGERY | Facility: CLINIC | Age: 74
End: 2024-11-28
Payer: MEDICARE

## 2024-12-11 ENCOUNTER — OFFICE VISIT (OUTPATIENT)
Dept: ORTHOPEDIC SURGERY | Facility: CLINIC | Age: 74
End: 2024-12-11
Payer: MEDICARE

## 2024-12-11 VITALS
HEIGHT: 64 IN | WEIGHT: 115 LBS | BODY MASS INDEX: 19.63 KG/M2 | DIASTOLIC BLOOD PRESSURE: 66 MMHG | SYSTOLIC BLOOD PRESSURE: 130 MMHG

## 2024-12-11 DIAGNOSIS — Z09 POSTOPERATIVE EXAMINATION: ICD-10-CM

## 2024-12-11 DIAGNOSIS — M70.62 TROCHANTERIC BURSITIS OF LEFT HIP: ICD-10-CM

## 2024-12-11 DIAGNOSIS — Z96.642 S/P TOTAL LEFT HIP ARTHROPLASTY: Primary | ICD-10-CM

## 2024-12-11 NOTE — PROGRESS NOTES
[facility] [place] [facility] Note    Subjective     Chief Complaint   Patient presents with    Follow-up     1 month follow up -- 3 months S/P Total Hip Arthroplasty Anterior Modified Left (DOS: 9/5/24)        HPI    It has been 1  month(s) since Ms. Joseph's last visit. She returns to clinic today for postoperative follow-up of left hip arthroplasty. The issue has been ongoing for 3 month(s). She rates her pain a 4/10 on the pain scale. Previous/current treatments: physical therapy. Current symptoms: pain. The pain is worse with walking; resting improve the pain. Overall, she is doing worse.  No fevers, chills or night sweats.  Still 100% improved compared to her preoperative symptoms.  Pain is located on the lateral aspect of the hip.    I have reviewed the following portions of the patient's history and agree with: History of Present Illness and Review of Systems    Patient Active Problem List   Diagnosis    Paroxysmal atrial fibrillation    Tobacco use    Chronic colitis    Mixed hyperlipidemia    Anxiety    Chronic anticoagulation    Long term current use of antiarrhythmic drug    Spinal stenosis of lumbar region    Degenerative arthritis of hip    Status post total replacement of left hip     Past Medical History:   Diagnosis Date    A-fib     Abnormal ECG     Allergic 48151    Arrhythmia     Arthritis     Arthritis of back     Back problem     Bronchitis     Cervical disc disorder     Chronic alcohol use     Chronic colitis     Fracture of hip     Fracture, femur     Glaucoma Years ago    Heart disease     Hip arthrosis     History of cardioversion     History of transesophageal echocardiography (YOLANDA)     Inflammatory bowel disease Years ago    Low back pain Years ago    Low back strain     Lumbosacral disc disease     Menopause     Mixed hyperlipidemia 10/11/2019    Palpitation     Benign palpitations    Scoliosis     Tibia/fibula fracture     Fall with right tibia/fibula fracture, status post tibia  IM harish, September 2013.    Tobacco use     Visual impairment Years ago    Wears glasses       Past Surgical History:   Procedure Laterality Date    CARDIAC ELECTROPHYSIOLOGY PROCEDURE N/A 06/26/2020    Procedure: PVA (paroxysmal), hold Sotalol 3 days, DNS Eliquis, Rhythmia (BSC) only using direct sense;  Surgeon: Isiah Garcia DO;  Location:  [facility];  Service: Cardiovascular;  Laterality: N/A;    CARDIOVERSION      COLONOSCOPY  2015    HAND SURGERY      HIP SURGERY      JOINT REPLACEMENT  9-5-24    LEG SURGERY Right     states five breaks, steal harish in R leg    TOTAL HIP ARTHROPLASTY Left 09/05/2024    Procedure: TOTAL HIP ARTHROPLASTY ANTERIOR MODIFIED LEFT;  Surgeon: Tommie Metcalf MD;  Location:  [facility];  Service: Orthopedics;  Laterality: Left;    TRIGGER POINT INJECTION        Family History   Problem Relation Age of Onset    Asthma Mother     Arthritis Mother     No Known Problems Father     Heart disease Sister     Diabetes Sister     Cancer Brother     No Known Problems Maternal Grandmother     No Known Problems Maternal Grandfather     No Known Problems Paternal Grandmother     No Known Problems Paternal Grandfather     Severe sprains Brother     Heart disease Other     Heart attack Other     Diabetes Other      Social History     Socioeconomic History    Marital status:    Tobacco Use    Smoking status: Former     Current packs/day: 0.25     Average packs/day: 0.3 packs/day for 68.0 years (17.0 ttl pk-yrs)     Types: Cigarettes     Start date: 1/1/1972     Passive exposure: Never    Smokeless tobacco: Never   Vaping Use    Vaping status: Never Used   Substance and Sexual Activity    Alcohol use: Yes     Alcohol/week: 2.0 standard drinks of alcohol     Types: 1 Glasses of wine, 1 Cans of beer per week     Comment: WEEKLY    Drug use: Never    Sexual activity: Not Currently     Partners: Male     Birth control/protection: None      Current Outpatient Medications on File Prior  to Visit   Medication Sig Dispense Refill    acetaminophen (Tylenol 8 Hour) 650 MG 8 hr tablet Take 1 tablet by mouth Every 6 (Six) Hours As Needed for Mild Pain.      albuterol sulfate  (90 Base) MCG/ACT inhaler Inhale 2 puffs 2 (Two) Times a Day. 8 g 1    apixaban (Eliquis) 5 MG tablet tablet Take 1 tablet by mouth 2 (Two) Times a Day. 180 tablet 3    FLUoxetine (PROzac) 40 MG capsule Take 1 capsule by mouth Daily. 90 capsule 3    gabapentin (NEURONTIN) 400 MG capsule Take  by mouth 2 (Two) Times a Day.      multivitamin with minerals (MULTIVITAMIN ADULTS 50+ PO) Take 1 tablet by mouth 2 (Two) Times a Day.      sotalol (Betapace) 80 MG tablet Take 1 tablet by mouth 2 (Two) Times a Day. 180 tablet 3    traMADol (ULTRAM) 50 MG tablet       travoprost, BAK free, (TRAVATAN) 0.004 % solution ophthalmic solution travoprost 0.004 % eye drops   Instill by ophthalmic route.       No current facility-administered medications on file prior to visit.      Allergies   Allergen Reactions    Ibuprofen GI Bleeding     Patient does not take due to being on blood thinners    Moxifloxacin Unknown (See Comments)    Penicillins Hives    Sulfa Antibiotics Nausea Only     nausea        Review of Systems   Constitutional:  Negative for activity change, appetite change, chills, diaphoresis, fatigue, fever and unexpected weight change.   HENT:  Negative for congestion, dental problem, drooling, ear discharge, ear pain, facial swelling, hearing loss, mouth sores, nosebleeds, postnasal drip, rhinorrhea, sinus pressure, sneezing, sore throat, tinnitus, trouble swallowing and voice change.    Eyes:  Negative for photophobia, pain, discharge, redness, itching and visual disturbance.   Respiratory:  Negative for apnea, cough, choking, chest tightness, shortness of breath, wheezing and stridor.    Cardiovascular:  Negative for chest pain, palpitations and leg swelling.   Gastrointestinal:  Negative for abdominal distention, abdominal pain,  "anal bleeding, blood in stool, constipation, diarrhea, nausea, rectal pain and vomiting.   Endocrine: Negative for cold intolerance, heat intolerance, polydipsia, polyphagia and polyuria.   Genitourinary:  Negative for decreased urine volume, difficulty urinating, dysuria, enuresis, flank pain, frequency, genital sores, hematuria and urgency.   Musculoskeletal:  Positive for arthralgias. Negative for back pain, gait problem, joint swelling, myalgias, neck pain and neck stiffness.   Skin:  Negative for color change, pallor, rash and wound.   Allergic/Immunologic: Negative for environmental allergies, food allergies and immunocompromised state.   Neurological:  Negative for dizziness, tremors, seizures, syncope, facial asymmetry, speech difficulty, weakness, light-headedness, numbness and headaches.   Hematological:  Negative for adenopathy. Does not bruise/bleed easily.   Psychiatric/Behavioral:  Negative for agitation, behavioral problems, confusion, decreased concentration, dysphoric mood, hallucinations, self-injury, sleep disturbance and suicidal ideas. The patient is not nervous/anxious and is not hyperactive.         Objective      Physical Exam  /66   Ht 162.6 cm (64\")   Wt 52.2 kg (115 lb)   LMP  (LMP Unknown)   BMI 19.74 kg/m²     Body mass index is 19.74 kg/m².  BMI is within normal parameters. No other follow-up for BMI required.      General:   Mental Status:  Alert   Appearance: Cooperative, in no acute distress   Build and Nutrition: Well-nourished well-developed female   Orientation: Alert and oriented to person, place and time   Posture: Normal   Gait: Nonantalgic with a cane    Integument:   Left hip: Wound is well-healed with no signs of infection    Lower Extremity:   Left Hip:    Tenderness:  Mild lateral tenderness    Swelling:  None    Crepitus:  None    Range of motion:  External Rotation: 30°       Internal Rotation: 30°       Flexion:  100°       Extension:  0°    Deformities: "  None  Functional testing: Negative Stinchfield    Short on the left compared to the right, scoliosis contributing      Imaging/Studies  Imaging Results (Last 24 Hours)       Procedure Component Value Units Date/Time    XR Hip With or Without Pelvis 2 - 3 View Left [[id number]] Resulted: 12/11/24 1016     Updated: 12/11/24 1016    Narrative:      Left Hip Radiographs  Indication: status-post left total hip arthroplasty  Views: low AP pelvis and lateral of the left hip    Comparison: no change compared to prior study, 9/25/2024    Findings:   The components are well aligned, with no signs of loosening or failure.                 Assessment and Plan     Diagnoses and all orders for this visit:    1. S/P total left hip arthroplasty (Primary)  -     XR Hip With or Without Pelvis 2 - 3 View Left    2. Postoperative examination    3. Trochanteric bursitis of left hip        1. S/P total left hip arthroplasty    2. Postoperative examination    3. Trochanteric bursitis of left hip          I reviewed my findings with the patient.  She is experiencing trochanteric bursitis and tendinitis, and I recommended conservative treatment.  She will try some diclofenac gel in addition to the physical therapy.  I will see her back in 6 weeks to ensure improvement.  Continue with physical therapy.  Follow-up with me sooner for any problems.    Return in about 6 weeks (around 1/22/2025).      Tommie Metcalf MD  12/11/24  10:52 EST    Dictated Utilizing Dragon Dictation

## 2025-01-23 ENCOUNTER — TELEPHONE (OUTPATIENT)
Dept: FAMILY MEDICINE CLINIC | Facility: CLINIC | Age: 75
End: 2025-01-23
Payer: MEDICARE

## 2025-01-23 DIAGNOSIS — K40.90 INGUINAL HERNIA OF RIGHT SIDE WITHOUT OBSTRUCTION OR GANGRENE: Primary | ICD-10-CM

## 2025-01-23 NOTE — TELEPHONE ENCOUNTER
Caller: Tam Joseph    Relationship: Emergency Contact    Best call back number:     What is the medical concern/diagnosis: HERNIA THAT IS GETTING BIGGER THAT IS PUSHING THROUGH.    What specialty or service is being requested: GENERAL SURGEON     What is the provider, practice or medical service name: [facility]    What is the office location: [place]    What is the office phone number:     Any additional details: DR MOSES WILL KNOW THE LAST NAME OF DR GONZALEZ  GENERAL SURGEON

## 2025-01-29 ENCOUNTER — OFFICE VISIT (OUTPATIENT)
Dept: CARDIOLOGY | Facility: CLINIC | Age: 75
End: 2025-01-29
Payer: MEDICARE

## 2025-01-29 VITALS
HEIGHT: 63 IN | BODY MASS INDEX: 20.84 KG/M2 | HEART RATE: 68 BPM | DIASTOLIC BLOOD PRESSURE: 60 MMHG | SYSTOLIC BLOOD PRESSURE: 138 MMHG | OXYGEN SATURATION: 94 % | WEIGHT: 117.6 LBS

## 2025-01-29 DIAGNOSIS — E78.2 MIXED HYPERLIPIDEMIA: ICD-10-CM

## 2025-01-29 DIAGNOSIS — I48.0 PAROXYSMAL ATRIAL FIBRILLATION: Primary | ICD-10-CM

## 2025-01-29 RX ORDER — TIMOLOL MALEATE 2.5 MG/ML
1 SOLUTION/ DROPS OPHTHALMIC 2 TIMES DAILY
COMMUNITY

## 2025-01-29 NOTE — PROGRESS NOTES
"[facility] at [facility]  Office visit  Mar Joseph  1950    There is no work phone number on file.    VISIT DATE:  1/29/2025    PCP: Anand Pineda DO  [id number] [facility] [address]    CC:  Chief Complaint   Patient presents with   • Paroxysmal atrial fibrillation     1-Yr F/U       Previous cardiac studies and procedures:  October 2019  Transesophageal echocardiogram with successful cardioversion  Left ventricular systolic function is normal. Estimated EF = 60%.  Mild to moderate biatrial enlargement.  No evidence of intracardiac thrombus or mass, clear left atrial appendage.  No significant valvular disease.    June 2020 A. fib ablation.    ASSESSMENT:   Diagnosis Plan   1. Paroxysmal atrial fibrillation        2. Mixed hyperlipidemia              PLAN:  Continue Eliquis 5 mg p.o. twice daily  Continue sotalol 40 mg p.o. daily    Hyperlipidemia: Excellent HDL level, mildly elevated LDL level.  Nonobstructive calcific atherosclerosis visualized in the descending aorta and proximal iliacs.  Continue heart healthy diet and regular exercise.  Not recommending pharmacologic therapy at this time.      Subjective  History of paroxysmal atrial fibrillation, initial episode in 2013.  Maintaining active lifestyle, exercising at the [employer] [facility] on a regular basis.  No recurrent symptoms concerning for atrial fibrillation since her ablation.     PHYSICAL EXAMINATION:  Vitals:    01/29/25 1055   BP: 138/60   BP Location: Left arm   Patient Position: Sitting   Cuff Size: Adult   Pulse: 68   SpO2: 94%   Weight: 53.3 kg (117 lb 9.6 oz)   Height: 160 cm (63\")     General Appearance:    Alert, cooperative, no distress, appears stated age   Head:    Normocephalic, without obvious abnormality, atraumatic   Eyes:    conjunctiva/corneas clear   Nose:   Nares normal, septum midline, mucosa normal, no drainage   Throat:   Lips, teeth and gums normal   Neck:   Supple, symmetrical, trachea " midline, no carotid    bruit or JVD   Lungs:     Clear to auscultation bilaterally, respirations unlabored   Chest Wall:    No tenderness or deformity    Heart:   Irregularly irregular, S1 and S2 normal, no murmur, rub   or gallop, normal carotid impulse bilaterally without bruit.   Abdomen:     Soft, non-tender   Extremities:   Extremities normal, atraumatic, no cyanosis or edema   Pulses:   2+ and symmetric all extremities   Skin:   Skin color, texture, turgor normal, no rashes or lesions       Diagnostic Data:    ECG 12 Lead    Date/Time: 1/29/2025 11:06 AM  Performed by: Kirk Ocasio III, MD    Authorized by: Kirk Ocasio III, MD  Comparison: compared with previous ECG from 5/16/2024  Comparison to previous ECG: Repolarization abnormalities have normalized  Rhythm: sinus rhythm    Clinical impression: normal ECG    Lab Results   Component Value Date    CHLPL 268 (H) 09/21/2023    TRIG 82 09/21/2023     (H) 09/21/2023     Lab Results   Component Value Date    GLUCOSE 123 (H) 09/06/2024    BUN 5 (L) 09/06/2024    CREATININE 0.40 (L) 09/06/2024     (L) 09/06/2024    K 4.2 09/06/2024     09/06/2024    CO2 25.0 09/06/2024     Lab Results   Component Value Date    HGBA1C 5.00 08/22/2024     Lab Results   Component Value Date    WBC 10.49 09/06/2024    HGB 10.2 (L) 09/06/2024    HCT 30.3 (L) 09/06/2024     09/06/2024       Allergies  Allergies   Allergen Reactions   • Ibuprofen GI Bleeding     Patient does not take due to being on blood thinners   • Moxifloxacin Unknown (See Comments)   • Penicillins Hives   • Sulfa Antibiotics Nausea Only     nausea       Current Medications    Current Outpatient Medications:   •  acetaminophen (Tylenol 8 Hour) 650 MG 8 hr tablet, Take 1 tablet by mouth Every 6 (Six) Hours As Needed for Mild Pain., Disp: , Rfl:   •  albuterol sulfate  (90 Base) MCG/ACT inhaler, Inhale 2 puffs 2 (Two) Times a Day. (Patient taking differently: Inhale 2 puffs As  Needed.), Disp: 8 g, Rfl: 1  •  apixaban (Eliquis) 5 MG tablet tablet, Take 1 tablet by mouth 2 (Two) Times a Day., Disp: 180 tablet, Rfl: 3  •  FLUoxetine (PROzac) 40 MG capsule, Take 1 capsule by mouth Daily., Disp: 90 capsule, Rfl: 3  •  gabapentin (NEURONTIN) 400 MG capsule, Take  by mouth 2 (Two) Times a Day., Disp: , Rfl:   •  multivitamin with minerals (MULTIVITAMIN ADULTS 50+ PO), Take 1 tablet by mouth 2 (Two) Times a Day., Disp: , Rfl:   •  sotalol (Betapace) 80 MG tablet, Take 1 tablet by mouth 2 (Two) Times a Day., Disp: 180 tablet, Rfl: 3  •  timolol (TIMOPTIC) 0.25 % ophthalmic solution, 1 drop 2 (Two) Times a Day. Per eye doctor, Disp: , Rfl:   •  traMADol (ULTRAM) 50 MG tablet, , Disp: , Rfl:   •  travoprost, REBEKA free, (TRAVATAN) 0.004 % solution ophthalmic solution, travoprost 0.004 % eye drops  Instill by ophthalmic route., Disp: , Rfl:           ROS  Review of Systems   Constitutional: Positive for malaise/fatigue.   Cardiovascular:  Positive for irregular heartbeat and palpitations.   Respiratory:  Negative for cough and shortness of breath.        SOCIAL HX  Social History     Socioeconomic History   • Marital status:    Tobacco Use   • Smoking status: Former     Current packs/day: 0.00     Average packs/day: 0.3 packs/day for 67.3 years (16.8 ttl pk-yrs)     Types: Cigarettes     Start date: 1972     Quit date: 2024     Years since quittin.8     Passive exposure: Never   • Smokeless tobacco: Never   Vaping Use   • Vaping status: Never Used   Substance and Sexual Activity   • Alcohol use: Yes     Alcohol/week: 2.0 standard drinks of alcohol     Types: 1 Glasses of wine, 1 Cans of beer per week     Comment: WEEKLY   • Drug use: Never   • Sexual activity: Not Currently     Partners: Male     Birth control/protection: None       FAMILY HX  Family History   Problem Relation Age of Onset   • Asthma Mother    • Arthritis Mother    • No Known Problems Father    • Heart disease Sister     • Diabetes Sister    • Cancer Brother    • Severe sprains Brother    • No Known Problems Maternal Grandmother    • No Known Problems Maternal Grandfather    • No Known Problems Paternal Grandmother    • No Known Problems Paternal Grandfather    • Heart disease Other    • Heart attack Other    • Diabetes Other              Kirk Ocasio III, MD, [facility]

## 2025-01-31 ENCOUNTER — TELEPHONE (OUTPATIENT)
Dept: FAMILY MEDICINE CLINIC | Facility: CLINIC | Age: 75
End: 2025-01-31
Payer: MEDICARE

## 2025-01-31 NOTE — TELEPHONE ENCOUNTER
SPOKE WITH PATIENT AND LET HER KNOW:   [facility] CALLED TO LET YOU KNOW THAT YOU HAVE AN APPOINTMENT THERE FOR MARCH 4TH AT 1:45PM, PLEASE ARRIVE AT 1:15 FOR REGISTRATION

## 2025-02-12 ENCOUNTER — PATIENT ROUNDING (BHMG ONLY) (OUTPATIENT)
Dept: URGENT CARE | Facility: CLINIC | Age: 75
End: 2025-02-12
Payer: MEDICARE

## 2025-02-12 NOTE — ED NOTES
Thank you for letting us care for you in your recent visit to our urgent care center. We would love to hear about your experience with us. Was this the first time you have visited our location?    We’re always looking for ways to make our patients’ experiences even better. Do you have any recommendations on ways we may improve?     I appreciate you taking the time to respond. Please be on the lookout for a survey about your recent visit from [employer] via text or email. We would greatly appreciate if you could fill that out and turn it back in. We want your voice to be heard and we value your feedback.   Thank you for choosing [facility] for your healthcare needs.

## 2025-03-04 RX ORDER — CLINDAMYCIN HYDROCHLORIDE 150 MG/1
CAPSULE ORAL
Qty: 4 CAPSULE | Refills: 1 | Status: SHIPPED | OUTPATIENT
Start: 2025-03-04

## 2025-03-04 NOTE — TELEPHONE ENCOUNTER
Patient needs a refill of her antibiotics for her dentist appointment coming up.   Patient would also like to know if she will need to take an antibiotic before a hernia procedure coming up.       Please advise.

## 2025-03-20 NOTE — PROGRESS NOTES
Cardiac Electrophysiology Outpatient Follow Up Note            [facility]    Follow Up Office Visit      Mar Joseph  [id number]  03/24/2025  [unfilled]  [unfilled]    Primary Care Physician: Anand Pineda DO    Referred By: No ref. provider found    Subjective     CC: paroxysmal atrial fibrillation    PROBLEM LIST:        Patient Active Problem List     Diagnosis Date Noted    Paroxysmal atrial fibrillation 01/13/2017       Priority: High       Note Last Updated: 3/18/2024       Diagnosed 2013  Recurrence with YOLANDA/ECV 10/16/2019 / LVEF 60% / LA moderately dilated   EPs and PVI 6/26/2020 Dr. Garcia  Sotalol restarted secondary to recurrent palpitations       Mixed hyperlipidemia 10/11/2019       Priority: Medium    Chronic anticoagulation 07/11/2022       Priority: Low    Long term current use of antiarrhythmic drug 07/11/2022       Priority: Low    Tobacco use 01/13/2017       Priority: Low    Spinal stenosis of lumbar region 10/13/2022    Anxiety 11/18/2019    Chronic colitis 01/13/2017       History of Present Illness:   Mar Joseph is a 74 y.o. female who presents to my electrophysiology clinic for follow up of paroxysmal atrial fibrillation.  She is chronically maintained on sotalol for arrhythmia suppression and Eliquis 5mg PO BID for stroke prevention    Since her last visit  she denies chest pain, chest pressure, chest heaviness, shortness of breath, palpitations, edema.     Past Medical History:   Diagnosis Date    A-fib     Abnormal ECG     Allergic 73466    Arrhythmia     Arthritis     Arthritis of back     Back problem     Bronchitis     Cervical disc disorder     Chronic alcohol use     Chronic colitis     Fracture of hip     Fracture, femur     Glaucoma Years ago    Heart disease     Hip arthrosis     History of cardioversion     History of transesophageal echocardiography (YOLANDA)     Inflammatory bowel disease Years ago    Low back  pain Years ago    Low back strain     Lumbosacral disc disease     Menopause     Mixed hyperlipidemia 10/11/2019    Palpitation     Benign palpitations    Scoliosis     Tibia/fibula fracture     Fall with right tibia/fibula fracture, status post tibia IM harish, 2013.    Tobacco use     Visual impairment Years ago    Wears glasses        Past Surgical History:   Procedure Laterality Date    CARDIAC ELECTROPHYSIOLOGY PROCEDURE N/A 2020    Procedure: PVA (paroxysmal), hold Sotalol 3 days, DNS Eliquis, Rhythmia (BSC) only using direct sense;  Surgeon: Isiah Garcia DO;  Location:  [facility] INVASIVE LOCATION;  Service: Cardiovascular;  Laterality: N/A;    CARDIOVERSION      COLONOSCOPY      HAND SURGERY      HIP SURGERY      JOINT REPLACEMENT  24    LEG SURGERY Right     states five breaks, steal harish in R leg    TOTAL HIP ARTHROPLASTY Left 2024    Procedure: TOTAL HIP ARTHROPLASTY ANTERIOR MODIFIED LEFT;  Surgeon: Tommie Metcalf MD;  Location:  [facility] OR;  Service: Orthopedics;  Laterality: Left;    TRIGGER POINT INJECTION         Family History   Problem Relation Age of Onset    Asthma Mother     Arthritis Mother     No Known Problems Father     Heart disease Sister     Diabetes Sister     Cancer Brother     Severe sprains Brother     No Known Problems Maternal Grandmother     No Known Problems Maternal Grandfather     No Known Problems Paternal Grandmother     No Known Problems Paternal Grandfather     Heart disease Other     Heart attack Other     Diabetes Other        Social History     Socioeconomic History    Marital status:    Tobacco Use    Smoking status: Former     Current packs/day: 0.00     Average packs/day: 0.3 packs/day for 67.3 years (16.8 ttl pk-yrs)     Types: Cigarettes     Start date: 1972     Quit date: 2024     Years since quittin.9     Passive exposure: Never    Smokeless tobacco: Never   Vaping Use    Vaping status: Never Used   Substance and  Sexual Activity    Alcohol use: Yes     Alcohol/week: 2.0 standard drinks of alcohol     Types: 1 Glasses of wine, 1 Cans of beer per week     Comment: WEEKLY    Drug use: Never    Sexual activity: Not Currently     Partners: Male     Birth control/protection: None         Current Outpatient Medications:     acetaminophen (Tylenol 8 Hour) 650 MG 8 hr tablet, Take 1 tablet by mouth Every 6 (Six) Hours As Needed for Mild Pain., Disp: , Rfl:     albuterol sulfate  (90 Base) MCG/ACT inhaler, Inhale 2 puffs 2 (Two) Times a Day. (Patient taking differently: Inhale 2 puffs As Needed.), Disp: 8 g, Rfl: 1    apixaban (Eliquis) 5 MG tablet tablet, Take 1 tablet by mouth 2 (Two) Times a Day., Disp: 180 tablet, Rfl: 3    clindamycin (Cleocin) 150 MG capsule, Patient is to take 4 caps 1 hour prior to dental procedure/cleaning, Disp: 4 capsule, Rfl: 1    FLUoxetine (PROzac) 40 MG capsule, Take 1 capsule by mouth Daily., Disp: 90 capsule, Rfl: 3    gabapentin (NEURONTIN) 400 MG capsule, Take  by mouth 2 (Two) Times a Day., Disp: , Rfl:     multivitamin with minerals (MULTIVITAMIN ADULTS 50+ PO), Take 1 tablet by mouth 2 (Two) Times a Day., Disp: , Rfl:     sotalol (Betapace) 80 MG tablet, Take 1 tablet by mouth 2 (Two) Times a Day., Disp: 180 tablet, Rfl: 3    timolol (TIMOPTIC) 0.25 % ophthalmic solution, 1 drop 2 (Two) Times a Day. Per eye doctor, Disp: , Rfl:     traMADol (ULTRAM) 50 MG tablet, , Disp: , Rfl:     travoprost, BAK free, (TRAVATAN) 0.004 % solution ophthalmic solution, travoprost 0.004 % eye drops  Instill by ophthalmic route., Disp: , Rfl:     Allergies   Allergen Reactions    Ibuprofen GI Bleeding     Patient does not take due to being on blood thinners    Moxifloxacin Unknown (See Comments)    Penicillins Hives    Sulfa Antibiotics Nausea Only     nausea       Objective   There were no vitals filed for this visit.  There is no height or weight on file to calculate BMI.    Constitutional:        Appearance: Normal and healthy appearance. Not in distress.   Eyes:      Pupils: Pupils are equal, round, and reactive to light.   HENT:      Head: Normocephalic and atraumatic.   Pulmonary:      Effort: Pulmonary effort is normal.      Breath sounds: Normal breath sounds.   Cardiovascular:      PMI at left midclavicular line. Normal rate. Regular rhythm. Normal S1. Normal S2.       Murmurs: There is no murmur.      No gallop.  No click. No rub.   Pulses:     Intact distal pulses.   Edema:     Peripheral edema absent.   Abdominal:      General: Abdomen is flat. Bowel sounds are normal. There is no distension.      Palpations: Abdomen is soft.   Skin:     General: Skin is warm and dry.   Neurological:      General: No focal deficit present.      Mental Status: Alert, oriented to person, place, and time and oriented to person, place and time.   Psychiatric:         Attention and Perception: Attention normal.         Mood and Affect: Mood normal.         Behavior: Behavior is cooperative.         Lab Results   Component Value Date    GLUCOSE 123 (H) 09/06/2024    CALCIUM 9.2 09/06/2024     (L) 09/06/2024    K 4.2 09/06/2024    CO2 25.0 09/06/2024     09/06/2024    BUN 5 (L) 09/06/2024    CREATININE 0.40 (L) 09/06/2024    EGFRIFAFRI 119 07/21/2020    EGFRIFNONA 125 07/28/2021    BCR 12.5 09/06/2024    ANIONGAP 10.0 09/06/2024     Lab Results   Component Value Date    WBC 10.49 09/06/2024    HGB 10.2 (L) 09/06/2024    HCT 30.3 (L) 09/06/2024    .3 (H) 09/06/2024     09/06/2024     Lab Results   Component Value Date    INR 1.23 (H) 08/22/2024    PROTIME 15.6 (H) 08/22/2024     Lab Results   Component Value Date    TSH 1.550 09/21/2023    R7ZZNHO 5.75 10/09/2019       Cardiac Testing:     I personally viewed and interpreted the patient's EKG/Telemetry/lab data.    Procedures    Tobacco Cessation: N/A  Obstructive Sleep Apnea Screening: N/A    Advance Care Planning   ACP discussion was held with  the patient during this visit. Patient has an advance directive (not in EMR), copy requested.       Assessment & Plan      Assessment & Plan  AF (paroxysmal atrial fibrillation)  Remains in normal rhythm today  She remains on Sotalol and Eliquis                  Follow Up:   No follow-ups on file.    Thank you for allowing me to participate in the care of your patient. Please to not hesitate to contact me with additional questions or concerns.      ALDO Paris  [facility] / [facility]

## 2025-03-24 ENCOUNTER — OFFICE VISIT (OUTPATIENT)
Dept: CARDIOLOGY | Facility: CLINIC | Age: 75
End: 2025-03-24
Payer: MEDICARE

## 2025-03-24 VITALS
WEIGHT: 117.2 LBS | DIASTOLIC BLOOD PRESSURE: 66 MMHG | HEART RATE: 71 BPM | SYSTOLIC BLOOD PRESSURE: 120 MMHG | HEIGHT: 63 IN | OXYGEN SATURATION: 99 % | BODY MASS INDEX: 20.77 KG/M2

## 2025-03-24 DIAGNOSIS — I48.0 AF (PAROXYSMAL ATRIAL FIBRILLATION): Primary | ICD-10-CM

## 2025-03-24 PROCEDURE — 99214 OFFICE O/P EST MOD 30 MIN: CPT | Performed by: NURSE PRACTITIONER

## 2025-03-24 RX ORDER — SOTALOL HYDROCHLORIDE 80 MG/1
80 TABLET ORAL 2 TIMES DAILY
Qty: 180 TABLET | Refills: 3 | Status: SHIPPED | OUTPATIENT
Start: 2025-03-24

## 2025-04-18 ENCOUNTER — OFFICE VISIT (OUTPATIENT)
Dept: FAMILY MEDICINE CLINIC | Facility: CLINIC | Age: 75
End: 2025-04-18
Payer: MEDICARE

## 2025-04-18 VITALS
TEMPERATURE: 98.8 F | DIASTOLIC BLOOD PRESSURE: 74 MMHG | BODY MASS INDEX: 20.62 KG/M2 | OXYGEN SATURATION: 97 % | SYSTOLIC BLOOD PRESSURE: 124 MMHG | HEART RATE: 74 BPM | HEIGHT: 63 IN | WEIGHT: 116.4 LBS

## 2025-04-18 DIAGNOSIS — U07.1 COVID-19: Primary | ICD-10-CM

## 2025-04-18 RX ORDER — BENZOCAINE AND MENTHOL, UNSPECIFIED FORM 15; 2.3 MG/1; MG/1
1 LOZENGE ORAL EVERY 4 HOURS PRN
Qty: 100 LOZENGE | Refills: 0 | Status: SHIPPED | OUTPATIENT
Start: 2025-04-18

## 2025-04-18 RX ORDER — METHYLPREDNISOLONE 4 MG/1
TABLET ORAL
Qty: 21 EACH | Refills: 0 | Status: ON HOLD | OUTPATIENT
Start: 2025-04-18 | End: 2025-05-12

## 2025-04-18 NOTE — PATIENT INSTRUCTIONS
Wait at least 2 weeks before engaging in physical activity (especially that which raises your heart rate over 140-150).  After that, SLOWLY return to usual activities over the course of 4 weeks  In general, expect to take about 6 weeks to return to your previous level of physical fitness and activity. While long-COVID cannot predictably be prevented in all cases, there are things we can do to improve your chances of full recovery without long-term effects.  Your body loses extra fluids when you are sick through increased breathing rate, sweating/fever, as well as increased mucus production.  Coupled with a decrease and overall fluid/food intake, it is imperative that you increase your fluid intake while sick.  An easy way to do this is drinking 1/2-1 bottle of Pedialyte, liquid IV, or Pedialyte popsicles (provided you have not been instructed otherwise to restrict your salt intake)  If you have a pulse oximeter at home, monitor your oxygen if you feel short of breath.  Your goal level is over 92%.  More recent COVID variants have been causing eye irritation and earaches.  Use warm compresses or hot water bottle respectively for these, and follow-up if needed with your primary care doctor.  Walt's Vapo Rub or Walt's Steam Shower tabs may help relieve cough and congestion. OTC cough drops will also probably help. You may feel better sleeping on your front instead of your back. If you have a percussion massager, using this on your posterior chest wall/back can help break up congestion.  Zinc lozenges may help reduce the adhesion of viruses in the mouth and prevent transmission, and are generally low risk. Zinc supplements are generally not helpful unless you have a specific deficiency.  Nasal steroid sprays like Flonase help to rinse the nasal passages and reduce inflammation, and in some studies can shorten the duration of COVID/flu symptoms.

## 2025-05-07 NOTE — PROGRESS NOTES
Established Patient Office Visit      Patient Name: Mar Joseph  : 1950   MRN: [id number]   Care Team: Patient Care Team:  Anand Pineda DO as PCP - General (Family Medicine)  Isiah Garcia DO as Consulting Physician (Cardiology)  Kirk Ocasio III, MD as Cardiologist (Cardiology)  Artur Figueredo PA as Physician Assistant (Cardiology)  Maryjane Martins PA-C as Physician Assistant (Physician Assistant)  Tommie Metcalf MD as Consulting Physician (Orthopedic Surgery)    Chief Complaint:    Chief Complaint   Patient presents with    Covid 19     Pt was diagnosed with covid on 4/15 and she states her throat is sore, and she can barely talk       History of Present Illness: Mar Joseph is a 74 y.o. female who is here today for chief complaint.    Sore Throat  Chronicity:  New  Onset:  In the past 7 days  Progression since onset:  Unchanged  Pain worse on:  Both  Fever:  No fever  Pain - numeric:  7/10  Associated symptoms: congestion, drainage, ear pain, headaches and hoarse voice    Associated symptoms: no abdominal pain, no cough, no diarrhea, no drooling, no neck pain, no vomiting, no swollen glands, no trouble swallowing and no shortness of breath    Exposure to: exposure from contact with known COVID-19 case        Slowing some symptoms from COVID, diagnosed on 4/15    This patient is accompanied by their self who contributes to the history of their care.    The following portions of the patient's history were reviewed and updated as appropriate: allergies, current medications, past family history, past medical history, past social history, past surgical history and problem list.    Subjective      Review of Systems:   Review of Systems   HENT:  Positive for congestion, ear pain, hoarse voice and sore throat. Negative for drooling, swollen glands and trouble swallowing.    Respiratory:  Negative for cough and shortness of breath.    Gastrointestinal:  Negative for abdominal pain,  diarrhea and vomiting.   Musculoskeletal:  Negative for neck pain.    - See HPI    Past Medical History:   Past Medical History:   Diagnosis Date    A-fib     Abnormal ECG     Allergic [id number]    Arrhythmia     Arthritis     Arthritis of back     Back problem     Bronchitis     Cervical disc disorder     Chronic alcohol use     Chronic colitis     Fracture of hip     Fracture, femur     Glaucoma Years ago    Heart disease     Hip arthrosis     History of cardioversion     History of transesophageal echocardiography (YOLANDA)     Inflammatory bowel disease Years ago    Low back pain Years ago    Low back strain     Lumbosacral disc disease     Menopause     Mixed hyperlipidemia 10/11/2019    Palpitation     Benign palpitations    Scoliosis     Tibia/fibula fracture     Fall with right tibia/fibula fracture, status post tibia IM harish, September 2013.    Tobacco use     Visual impairment Years ago    Wears glasses        Past Surgical History:   Past Surgical History:   Procedure Laterality Date    CARDIAC ELECTROPHYSIOLOGY PROCEDURE N/A 06/26/2020    Procedure: PVA (paroxysmal), hold Sotalol 3 days, DNS Eliquis, Rhythmia (BSC) only using direct sense;  Surgeon: Isiah Garcia DO;  Location: [facility] EP INVASIVE LOCATION;  Service: Cardiovascular;  Laterality: N/A;    CARDIOVERSION      COLONOSCOPY  2015    HAND SURGERY      HIP SURGERY      JOINT REPLACEMENT  9-5-24    LEG SURGERY Right     states five breaks, steal harish in R leg    TOTAL HIP ARTHROPLASTY Left 09/05/2024    Procedure: TOTAL HIP ARTHROPLASTY ANTERIOR MODIFIED LEFT;  Surgeon: Tommie Metcalf MD;  Location: [facility] OR;  Service: Orthopedics;  Laterality: Left;    TRIGGER POINT INJECTION         Family History:   Family History   Problem Relation Age of Onset    Asthma Mother     Arthritis Mother     No Known Problems Father     Heart disease Sister     Diabetes Sister     Cancer Brother     Severe sprains Brother     No Known Problems Maternal Grandmother     No  Known Problems Maternal Grandfather     No Known Problems Paternal Grandmother     No Known Problems Paternal Grandfather     Heart disease Other     Heart attack Other     Diabetes Other        Social History:   Social History     Socioeconomic History    Marital status:    Tobacco Use    Smoking status: Former     Current packs/day: 0.00     Average packs/day: 0.3 packs/day for 78.6 years (20.2 ttl pk-yrs)     Types: Cigarettes     Start date: 1972     Quit date: 2024     Years since quittin.0     Passive exposure: Never    Smokeless tobacco: Never   Vaping Use    Vaping status: Never Used   Substance and Sexual Activity    Alcohol use: Yes     Alcohol/week: 2.0 standard drinks of alcohol     Types: 1 Glasses of wine, 1 Cans of beer per week     Comment: WEEKLY    Drug use: Never    Sexual activity: Not Currently     Partners: Male     Birth control/protection: None       Tobacco History:   Social History     Tobacco Use   Smoking Status Former    Current packs/day: 0.00    Average packs/day: 0.3 packs/day for 78.6 years (20.2 ttl pk-yrs)    Types: Cigarettes    Start date: 1972    Quit date: 2024    Years since quittin.0    Passive exposure: Never   Smokeless Tobacco Never       Medications:   Outpatient Medications Prior to Visit   Medication Sig Dispense Refill    acetaminophen (Tylenol 8 Hour) 650 MG 8 hr tablet Take 1 tablet by mouth Every 6 (Six) Hours As Needed for Mild Pain.      apixaban (Eliquis) 5 MG tablet tablet Take 1 tablet by mouth 2 (Two) Times a Day. 180 tablet 3    clindamycin (Cleocin) 150 MG capsule Patient is to take 4 caps 1 hour prior to dental procedure/cleaning 4 capsule 1    FLUoxetine (PROzac) 40 MG capsule Take 1 capsule by mouth Daily. 90 capsule 3    gabapentin (NEURONTIN) 400 MG capsule Take  by mouth 2 (Two) Times a Day.      multivitamin with minerals (MULTIVITAMIN ADULTS 50+ PO) Take 1 tablet by mouth 2 (Two) Times a Day.      sotalol (Betapace) 80  "MG tablet Take 1 tablet by mouth 2 (Two) Times a Day. 180 tablet 3    timolol (TIMOPTIC) 0.25 % ophthalmic solution 1 drop 2 (Two) Times a Day. Per eye doctor      traMADol (ULTRAM) 50 MG tablet       travoprost, BAK free, (TRAVATAN) 0.004 % solution ophthalmic solution travoprost 0.004 % eye drops   Instill by ophthalmic route.       No facility-administered medications prior to visit.        Allergies:   Allergies   Allergen Reactions    Ibuprofen GI Bleeding     Patient does not take due to being on blood thinners    Moxifloxacin Unknown (See Comments)    Penicillins Hives    Sulfa Antibiotics Nausea Only     nausea       Objective   Objective     Physical Exam:  Vital Signs:   Vitals:    04/18/25 1414   BP: 124/74   Pulse: 74   Temp: 98.8 °F (37.1 °C)   TempSrc: Oral   SpO2: 97%   Weight: 52.8 kg (116 lb 6.4 oz)   Height: 160 cm (62.99\")     Body mass index is 20.62 kg/m².     Physical Exam  Nursing note reviewed  Const: NAD, A&Ox4, Pleasant, Cooperative  Eyes: EOMI, no conjunctivitis  ENT: No nasal discharge present, neck supple  Cardiac: Regular rate and rhythm, no cyanosis  Resp: Respiratory rate within normal limits, no increased work of breathing, no audible wheezing or retractions noted  GI: No distention or ascites  MSK: Motor and sensation grossly intact in bilateral upper extremities  Neurologic: CN II-XII grossly intact  Psych: Appropriate mood and behavior.  Skin: Warm, dry  Procedures/Radiology     Procedures  No radiology results for the last 7 days     Assessment & Plan   Assessment / Plan      Assessment/Plan:   Problems Addressed This Visit  Diagnoses and all orders for this visit:    1. COVID-19 (Primary)  -     Sodium Chloride-Sodium Bicarb (Neti Pot Sinus Wash) 2300-700 MG kit; Use once daily as directed with distilled water  Dispense: 30 each; Refill: 0  -     Benzocaine-Menthol (Cepacol) 15-2.3 MG lozenge; Dissolve 1 each in the mouth Every 4 (Four) Hours As Needed (sore throat).  Dispense: " 100 lozenge; Refill: 0  -     methylPREDNISolone (MEDROL) 4 MG dose pack; Take as directed on package instructions.  Dispense: 21 each; Refill: 0      Problem List Items Addressed This Visit    None  Visit Diagnoses         COVID-19    -  Primary    Relevant Medications    Sodium Chloride-Sodium Bicarb (Neti Pot Sinus Wash) 2300-700 MG kit    Benzocaine-Menthol (Cepacol) 15-2.3 MG lozenge    methylPREDNISolone (MEDROL) 4 MG dose pack            New Medications Ordered This Visit   Medications    Sodium Chloride-Sodium Bicarb (Neti Pot Sinus Wash) 2300-700 MG kit     Sig: Use once daily as directed with distilled water     Dispense:  30 each     Refill:  0    Benzocaine-Menthol (Cepacol) 15-2.3 MG lozenge     Sig: Dissolve 1 each in the mouth Every 4 (Four) Hours As Needed (sore throat).     Dispense:  100 lozenge     Refill:  0    methylPREDNISolone (MEDROL) 4 MG dose pack     Sig: Take as directed on package instructions.     Dispense:  21 each     Refill:  0        Patient Instructions   Wait at least 2 weeks before engaging in physical activity (especially that which raises your heart rate over 140-150).  After that, SLOWLY return to usual activities over the course of 4 weeks  In general, expect to take about 6 weeks to return to your previous level of physical fitness and activity. While long-COVID cannot predictably be prevented in all cases, there are things we can do to improve your chances of full recovery without long-term effects.  Your body loses extra fluids when you are sick through increased breathing rate, sweating/fever, as well as increased mucus production.  Coupled with a decrease and overall fluid/food intake, it is imperative that you increase your fluid intake while sick.  An easy way to do this is drinking 1/2-1 bottle of Pedialyte, liquid IV, or Pedialyte popsicles (provided you have not been instructed otherwise to restrict your salt intake)  If you have a pulse oximeter at home, monitor  your oxygen if you feel short of breath.  Your goal level is over 92%.  More recent COVID variants have been causing eye irritation and earaches.  Use warm compresses or hot water bottle respectively for these, and follow-up if needed with your primary care doctor.  Walt's Vapo Rub or Walt's Steam Shower tabs may help relieve cough and congestion. OTC cough drops will also probably help. You may feel better sleeping on your front instead of your back. If you have a percussion massager, using this on your posterior chest wall/back can help break up congestion.  Zinc lozenges may help reduce the adhesion of viruses in the mouth and prevent transmission, and are generally low risk. Zinc supplements are generally not helpful unless you have a specific deficiency.  Nasal steroid sprays like Flonase help to rinse the nasal passages and reduce inflammation, and in some studies can shorten the duration of COVID/flu symptoms.    Follow Up:   No follow-ups on file.        DO LASHON Gilmore RD  [facility]  [address] [facility] [address]  Fax [phone]  Phone [phone]    Disclaimer to patients: The 21st Century Cares Act makes medical notes like these available to patients in the interest of transparency. However, please be advised that this is still a medical document. It is intended as bzob-sy-qyqv communication. Many sections may include medical language or jargon, abbreviations, and additional verbiage that are unfamiliar or confusing. In some ways it may come across as blunt, direct, or may be summarized in order to clearly and concisely communicate the most crucial information to medical professionals. It may also include mentions of conditions that are unlikely but considered as part of the differential diagnosis, including serious disorders. These are not always discussed at length at the time of appointment because their  likelihood is so low, but may be included in a medical note to make it clear what has been considered and/or ruled out as part of a work-up. Medical documents are intended to carry relevant information, facts as evident, and the personal clinical opinion of the physician. If you have any questions regarding this medical document, please bring them to the attention of the physician at your next scheduled appointment.

## 2025-05-08 ENCOUNTER — TRANSCRIBE ORDERS (OUTPATIENT)
Dept: LAB | Facility: HOSPITAL | Age: 75
End: 2025-05-08
Payer: MEDICARE

## 2025-05-08 ENCOUNTER — LAB (OUTPATIENT)
Dept: LAB | Facility: HOSPITAL | Age: 75
End: 2025-05-08
Payer: MEDICARE

## 2025-05-08 DIAGNOSIS — K40.90 INGUINAL HERNIA WITHOUT OBSTRUCTION OR GANGRENE, RECURRENCE NOT SPECIFIED, UNSPECIFIED LATERALITY: ICD-10-CM

## 2025-05-08 DIAGNOSIS — K40.90 INGUINAL HERNIA WITHOUT OBSTRUCTION OR GANGRENE, RECURRENCE NOT SPECIFIED, UNSPECIFIED LATERALITY: Primary | ICD-10-CM

## 2025-05-08 LAB
ANION GAP SERPL CALCULATED.3IONS-SCNC: 9.5 MMOL/L (ref 5–15)
BASOPHILS # BLD AUTO: 0.07 10*3/MM3 (ref 0–0.2)
BASOPHILS NFR BLD AUTO: 1.4 % (ref 0–1.5)
BUN SERPL-MCNC: 12 MG/DL (ref 8–23)
BUN/CREAT SERPL: 21.8 (ref 7–25)
CALCIUM SPEC-SCNC: 9.7 MG/DL (ref 8.6–10.5)
CHLORIDE SERPL-SCNC: 100 MMOL/L (ref 98–107)
CO2 SERPL-SCNC: 25.5 MMOL/L (ref 22–29)
CREAT SERPL-MCNC: 0.55 MG/DL (ref 0.57–1)
DEPRECATED RDW RBC AUTO: 38.7 FL (ref 37–54)
EGFRCR SERPLBLD CKD-EPI 2021: 96.9 ML/MIN/1.73
EOSINOPHIL # BLD AUTO: 0.2 10*3/MM3 (ref 0–0.4)
EOSINOPHIL NFR BLD AUTO: 3.9 % (ref 0.3–6.2)
ERYTHROCYTE [DISTWIDTH] IN BLOOD BY AUTOMATED COUNT: 11.3 % (ref 12.3–15.4)
GLUCOSE SERPL-MCNC: 99 MG/DL (ref 65–99)
HCT VFR BLD AUTO: 33.7 % (ref 34–46.6)
HGB BLD-MCNC: 11.4 G/DL (ref 12–15.9)
IMM GRANULOCYTES # BLD AUTO: 0.01 10*3/MM3 (ref 0–0.05)
IMM GRANULOCYTES NFR BLD AUTO: 0.2 % (ref 0–0.5)
LYMPHOCYTES # BLD AUTO: 1.67 10*3/MM3 (ref 0.7–3.1)
LYMPHOCYTES NFR BLD AUTO: 32.9 % (ref 19.6–45.3)
MCH RBC QN AUTO: 32 PG (ref 26.6–33)
MCHC RBC AUTO-ENTMCNC: 33.8 G/DL (ref 31.5–35.7)
MCV RBC AUTO: 94.7 FL (ref 79–97)
MONOCYTES # BLD AUTO: 0.55 10*3/MM3 (ref 0.1–0.9)
MONOCYTES NFR BLD AUTO: 10.8 % (ref 5–12)
NEUTROPHILS NFR BLD AUTO: 2.57 10*3/MM3 (ref 1.7–7)
NEUTROPHILS NFR BLD AUTO: 50.8 % (ref 42.7–76)
NRBC BLD AUTO-RTO: 0 /100 WBC (ref 0–0.2)
PLATELET # BLD AUTO: 328 10*3/MM3 (ref 140–450)
PMV BLD AUTO: 11.4 FL (ref 6–12)
POTASSIUM SERPL-SCNC: 5.4 MMOL/L (ref 3.5–5.2)
RBC # BLD AUTO: 3.56 10*6/MM3 (ref 3.77–5.28)
SODIUM SERPL-SCNC: 135 MMOL/L (ref 136–145)
WBC NRBC COR # BLD AUTO: 5.07 10*3/MM3 (ref 3.4–10.8)

## 2025-05-08 PROCEDURE — 36415 COLL VENOUS BLD VENIPUNCTURE: CPT

## 2025-05-08 PROCEDURE — 80048 BASIC METABOLIC PNL TOTAL CA: CPT

## 2025-05-08 PROCEDURE — 85025 COMPLETE CBC W/AUTO DIFF WBC: CPT

## 2025-05-11 ENCOUNTER — ANESTHESIA EVENT (OUTPATIENT)
Dept: PERIOP | Facility: HOSPITAL | Age: 75
End: 2025-05-11
Payer: MEDICARE

## 2025-05-11 RX ORDER — FAMOTIDINE 10 MG/ML
20 INJECTION, SOLUTION INTRAVENOUS ONCE
Status: CANCELLED | OUTPATIENT
Start: 2025-05-11 | End: 2025-05-11

## 2025-05-12 ENCOUNTER — HOSPITAL ENCOUNTER (OUTPATIENT)
Facility: HOSPITAL | Age: 75
Setting detail: HOSPITAL OUTPATIENT SURGERY
Discharge: HOME | End: 2025-05-12
Attending: SURGERY | Admitting: SURGERY
Payer: MEDICARE

## 2025-05-12 ENCOUNTER — ANESTHESIA EVENT CONVERTED (OUTPATIENT)
Dept: ANESTHESIOLOGY | Facility: HOSPITAL | Age: 75
End: 2025-05-12
Payer: MEDICARE

## 2025-05-12 ENCOUNTER — ANESTHESIA (OUTPATIENT)
Dept: PERIOP | Facility: HOSPITAL | Age: 75
End: 2025-05-12
Payer: MEDICARE

## 2025-05-12 VITALS
HEIGHT: 63 IN | RESPIRATION RATE: 15 BRPM | WEIGHT: 115 LBS | OXYGEN SATURATION: 92 % | BODY MASS INDEX: 20.38 KG/M2 | HEART RATE: 83 BPM | SYSTOLIC BLOOD PRESSURE: 142 MMHG | TEMPERATURE: 97.8 F | DIASTOLIC BLOOD PRESSURE: 70 MMHG

## 2025-05-12 LAB — POTASSIUM SERPL-SCNC: 4.3 MMOL/L (ref 3.5–5.2)

## 2025-05-12 PROCEDURE — 84132 ASSAY OF SERUM POTASSIUM: CPT | Performed by: ANESTHESIOLOGY

## 2025-05-12 PROCEDURE — 25010000002 SUGAMMADEX 200 MG/2ML SOLUTION

## 2025-05-12 PROCEDURE — 25010000002 CEFAZOLIN PER 500 MG: Performed by: SURGERY

## 2025-05-12 PROCEDURE — 25810000003 LACTATED RINGERS PER 1000 ML: Performed by: ANESTHESIOLOGY

## 2025-05-12 PROCEDURE — C1781 MESH (IMPLANTABLE): HCPCS | Performed by: SURGERY

## 2025-05-12 PROCEDURE — 25010000002 DROPERIDOL PER 5 MG

## 2025-05-12 PROCEDURE — 25010000002 FENTANYL CITRATE (PF) 50 MCG/ML SOLUTION

## 2025-05-12 PROCEDURE — 25010000002 FENTANYL CITRATE (PF) 100 MCG/2ML SOLUTION

## 2025-05-12 PROCEDURE — 25010000002 PHENYLEPHRINE HCL-NACL 1000-0.9 MCG/10ML-% SOLUTION PREFILLED SYRINGE

## 2025-05-12 PROCEDURE — 25010000002 ONDANSETRON PER 1 MG

## 2025-05-12 PROCEDURE — 25010000002 BUPIVACAINE (PF) 0.25 % SOLUTION

## 2025-05-12 PROCEDURE — 25010000002 LIDOCAINE PF 1% 1 % SOLUTION: Performed by: ANESTHESIOLOGY

## 2025-05-12 PROCEDURE — 25010000002 LIDOCAINE PF 1% 1 % SOLUTION

## 2025-05-12 PROCEDURE — 25010000002 DEXAMETHASONE SODIUM PHOSPHATE 10 MG/ML SOLUTION

## 2025-05-12 PROCEDURE — 25010000002 PROPOFOL 10 MG/ML EMULSION

## 2025-05-12 PROCEDURE — 25010000002 HYDROMORPHONE 1 MG/ML SOLUTION

## 2025-05-12 DEVICE — DEV WND/CLS TISS STRATAFIX SPIRALPDS CT2 PLS ABS 2/0 23CM: Type: IMPLANTABLE DEVICE | Site: ABDOMEN | Status: FUNCTIONAL

## 2025-05-12 DEVICE — ARISTA AH ABSORBABLE HEMOSTATIC PARTICLES, 5G BELLOWS CONTAINER
Type: IMPLANTABLE DEVICE | Site: ABDOMEN | Status: FUNCTIONAL
Brand: ARISTA

## 2025-05-12 DEVICE — 3DMAX MID ANATOMICAL MESH, 10 CM X 16 CM (4" X 6"), LARGE, LEFT
Type: IMPLANTABLE DEVICE | Site: ABDOMEN | Status: FUNCTIONAL
Brand: 3DMAX

## 2025-05-12 DEVICE — 3DMAX MID ANATOMICAL MESH, 10 CM X 16 CM (4" X 6"), LARGE, RIGHT
Type: IMPLANTABLE DEVICE | Site: ABDOMEN | Status: FUNCTIONAL
Brand: 3DMAX

## 2025-05-12 RX ORDER — SODIUM CHLORIDE 0.9 % (FLUSH) 0.9 %
3-10 SYRINGE (ML) INJECTION AS NEEDED
Status: DISCONTINUED | OUTPATIENT
Start: 2025-05-12 | End: 2025-05-12 | Stop reason: HOSPADM

## 2025-05-12 RX ORDER — FAMOTIDINE 20 MG/1
20 TABLET, FILM COATED ORAL ONCE
Status: COMPLETED | OUTPATIENT
Start: 2025-05-12 | End: 2025-05-12

## 2025-05-12 RX ORDER — PROMETHAZINE HYDROCHLORIDE 25 MG/1
25 TABLET ORAL ONCE AS NEEDED
Status: DISCONTINUED | OUTPATIENT
Start: 2025-05-12 | End: 2025-05-12 | Stop reason: HOSPADM

## 2025-05-12 RX ORDER — LABETALOL HYDROCHLORIDE 5 MG/ML
5 INJECTION, SOLUTION INTRAVENOUS
Status: DISCONTINUED | OUTPATIENT
Start: 2025-05-12 | End: 2025-05-12 | Stop reason: HOSPADM

## 2025-05-12 RX ORDER — ONDANSETRON 2 MG/ML
4 INJECTION INTRAMUSCULAR; INTRAVENOUS ONCE AS NEEDED
Status: DISCONTINUED | OUTPATIENT
Start: 2025-05-12 | End: 2025-05-12 | Stop reason: HOSPADM

## 2025-05-12 RX ORDER — IPRATROPIUM BROMIDE AND ALBUTEROL SULFATE 2.5; .5 MG/3ML; MG/3ML
3 SOLUTION RESPIRATORY (INHALATION) ONCE AS NEEDED
Status: DISCONTINUED | OUTPATIENT
Start: 2025-05-12 | End: 2025-05-12 | Stop reason: HOSPADM

## 2025-05-12 RX ORDER — LABETALOL HYDROCHLORIDE 5 MG/ML
INJECTION, SOLUTION INTRAVENOUS AS NEEDED
Status: DISCONTINUED | OUTPATIENT
Start: 2025-05-12 | End: 2025-05-12 | Stop reason: SURG

## 2025-05-12 RX ORDER — DEXAMETHASONE SODIUM PHOSPHATE 10 MG/ML
INJECTION, SOLUTION INTRAMUSCULAR; INTRAVENOUS AS NEEDED
Status: DISCONTINUED | OUTPATIENT
Start: 2025-05-12 | End: 2025-05-12 | Stop reason: SURG

## 2025-05-12 RX ORDER — ROCURONIUM BROMIDE 10 MG/ML
INJECTION, SOLUTION INTRAVENOUS AS NEEDED
Status: DISCONTINUED | OUTPATIENT
Start: 2025-05-12 | End: 2025-05-12 | Stop reason: SURG

## 2025-05-12 RX ORDER — SODIUM CHLORIDE, SODIUM LACTATE, POTASSIUM CHLORIDE, CALCIUM CHLORIDE 600; 310; 30; 20 MG/100ML; MG/100ML; MG/100ML; MG/100ML
9 INJECTION, SOLUTION INTRAVENOUS CONTINUOUS
Status: DISCONTINUED | OUTPATIENT
Start: 2025-05-13 | End: 2025-05-12 | Stop reason: HOSPADM

## 2025-05-12 RX ORDER — ONDANSETRON 2 MG/ML
INJECTION INTRAMUSCULAR; INTRAVENOUS AS NEEDED
Status: DISCONTINUED | OUTPATIENT
Start: 2025-05-12 | End: 2025-05-12 | Stop reason: SURG

## 2025-05-12 RX ORDER — NALOXONE HCL 0.4 MG/ML
0.4 VIAL (ML) INJECTION AS NEEDED
Status: DISCONTINUED | OUTPATIENT
Start: 2025-05-12 | End: 2025-05-12 | Stop reason: HOSPADM

## 2025-05-12 RX ORDER — OXYCODONE AND ACETAMINOPHEN 5; 325 MG/1; MG/1
1 TABLET ORAL EVERY 4 HOURS PRN
Qty: 12 TABLET | Refills: 0 | Status: SHIPPED | OUTPATIENT
Start: 2025-05-12

## 2025-05-12 RX ORDER — HYDROCODONE BITARTRATE AND ACETAMINOPHEN 7.5; 325 MG/1; MG/1
1 TABLET ORAL EVERY 4 HOURS PRN
Status: DISCONTINUED | OUTPATIENT
Start: 2025-05-12 | End: 2025-05-12 | Stop reason: HOSPADM

## 2025-05-12 RX ORDER — SODIUM CHLORIDE, SODIUM LACTATE, POTASSIUM CHLORIDE, CALCIUM CHLORIDE 600; 310; 30; 20 MG/100ML; MG/100ML; MG/100ML; MG/100ML
9 INJECTION, SOLUTION INTRAVENOUS CONTINUOUS
Status: DISCONTINUED | OUTPATIENT
Start: 2025-05-12 | End: 2025-05-12 | Stop reason: HOSPADM

## 2025-05-12 RX ORDER — LIDOCAINE HYDROCHLORIDE 10 MG/ML
INJECTION, SOLUTION EPIDURAL; INFILTRATION; INTRACAUDAL; PERINEURAL AS NEEDED
Status: DISCONTINUED | OUTPATIENT
Start: 2025-05-12 | End: 2025-05-12 | Stop reason: SURG

## 2025-05-12 RX ORDER — SODIUM CHLORIDE 9 MG/ML
9 INJECTION, SOLUTION INTRAVENOUS AS NEEDED
Status: DISCONTINUED | OUTPATIENT
Start: 2025-05-12 | End: 2025-05-12 | Stop reason: HOSPADM

## 2025-05-12 RX ORDER — DEXAMETHASONE SODIUM PHOSPHATE 10 MG/ML
INJECTION, SOLUTION INTRAMUSCULAR; INTRAVENOUS
Status: COMPLETED | OUTPATIENT
Start: 2025-05-12 | End: 2025-05-12

## 2025-05-12 RX ORDER — PROPOFOL 10 MG/ML
VIAL (ML) INTRAVENOUS AS NEEDED
Status: DISCONTINUED | OUTPATIENT
Start: 2025-05-12 | End: 2025-05-12 | Stop reason: SURG

## 2025-05-12 RX ORDER — DROPERIDOL 2.5 MG/ML
0.62 INJECTION, SOLUTION INTRAMUSCULAR; INTRAVENOUS
Status: DISCONTINUED | OUTPATIENT
Start: 2025-05-12 | End: 2025-05-12 | Stop reason: HOSPADM

## 2025-05-12 RX ORDER — LIDOCAINE HYDROCHLORIDE 10 MG/ML
0.5 INJECTION, SOLUTION EPIDURAL; INFILTRATION; INTRACAUDAL; PERINEURAL ONCE AS NEEDED
Status: COMPLETED | OUTPATIENT
Start: 2025-05-12 | End: 2025-05-12

## 2025-05-12 RX ORDER — SODIUM CHLORIDE 0.9 % (FLUSH) 0.9 %
10 SYRINGE (ML) INJECTION AS NEEDED
Status: DISCONTINUED | OUTPATIENT
Start: 2025-05-12 | End: 2025-05-12 | Stop reason: HOSPADM

## 2025-05-12 RX ORDER — FENTANYL CITRATE 50 UG/ML
INJECTION, SOLUTION INTRAMUSCULAR; INTRAVENOUS AS NEEDED
Status: DISCONTINUED | OUTPATIENT
Start: 2025-05-12 | End: 2025-05-12 | Stop reason: SURG

## 2025-05-12 RX ORDER — HYDROCODONE BITARTRATE AND ACETAMINOPHEN 5; 325 MG/1; MG/1
1 TABLET ORAL ONCE AS NEEDED
Status: DISCONTINUED | OUTPATIENT
Start: 2025-05-12 | End: 2025-05-12 | Stop reason: HOSPADM

## 2025-05-12 RX ORDER — PROMETHAZINE HYDROCHLORIDE 25 MG/1
25 SUPPOSITORY RECTAL ONCE AS NEEDED
Status: DISCONTINUED | OUTPATIENT
Start: 2025-05-12 | End: 2025-05-12 | Stop reason: HOSPADM

## 2025-05-12 RX ORDER — FENTANYL CITRATE 50 UG/ML
50 INJECTION, SOLUTION INTRAMUSCULAR; INTRAVENOUS
Status: DISCONTINUED | OUTPATIENT
Start: 2025-05-12 | End: 2025-05-12 | Stop reason: HOSPADM

## 2025-05-12 RX ORDER — MIDAZOLAM HYDROCHLORIDE 1 MG/ML
0.5 INJECTION, SOLUTION INTRAMUSCULAR; INTRAVENOUS
Status: DISCONTINUED | OUTPATIENT
Start: 2025-05-12 | End: 2025-05-12 | Stop reason: HOSPADM

## 2025-05-12 RX ORDER — ONDANSETRON 4 MG/1
4 TABLET, FILM COATED ORAL EVERY 8 HOURS PRN
Qty: 12 TABLET | Refills: 0 | Status: SHIPPED | OUTPATIENT
Start: 2025-05-12 | End: 2026-05-12

## 2025-05-12 RX ORDER — HYDROCODONE BITARTRATE AND ACETAMINOPHEN 5; 325 MG/1; MG/1
TABLET ORAL
Status: COMPLETED
Start: 2025-05-12 | End: 2025-05-12

## 2025-05-12 RX ORDER — FENTANYL CITRATE 50 UG/ML
INJECTION, SOLUTION INTRAMUSCULAR; INTRAVENOUS
Status: COMPLETED
Start: 2025-05-12 | End: 2025-05-12

## 2025-05-12 RX ORDER — SODIUM CHLORIDE 0.9 % (FLUSH) 0.9 %
10 SYRINGE (ML) INJECTION EVERY 12 HOURS SCHEDULED
Status: DISCONTINUED | OUTPATIENT
Start: 2025-05-12 | End: 2025-05-12 | Stop reason: HOSPADM

## 2025-05-12 RX ORDER — DROPERIDOL 2.5 MG/ML
0.62 INJECTION, SOLUTION INTRAMUSCULAR; INTRAVENOUS ONCE AS NEEDED
Status: DISCONTINUED | OUTPATIENT
Start: 2025-05-12 | End: 2025-05-12 | Stop reason: HOSPADM

## 2025-05-12 RX ORDER — HYDROMORPHONE HYDROCHLORIDE 1 MG/ML
0.5 INJECTION, SOLUTION INTRAMUSCULAR; INTRAVENOUS; SUBCUTANEOUS
Status: COMPLETED | OUTPATIENT
Start: 2025-05-12 | End: 2025-05-12

## 2025-05-12 RX ORDER — PHENYLEPHRINE HCL IN 0.9% NACL 1 MG/10 ML
SYRINGE (ML) INTRAVENOUS AS NEEDED
Status: DISCONTINUED | OUTPATIENT
Start: 2025-05-12 | End: 2025-05-12 | Stop reason: SURG

## 2025-05-12 RX ORDER — HYDROCODONE BITARTRATE AND ACETAMINOPHEN 7.5; 325 MG/1; MG/1
TABLET ORAL
Status: COMPLETED
Start: 2025-05-12 | End: 2025-05-12

## 2025-05-12 RX ORDER — DOCUSATE SODIUM 100 MG/1
100 CAPSULE, LIQUID FILLED ORAL 2 TIMES DAILY
Qty: 30 CAPSULE | Refills: 1 | Status: SHIPPED | OUTPATIENT
Start: 2025-05-12 | End: 2026-05-12

## 2025-05-12 RX ORDER — HYDRALAZINE HYDROCHLORIDE 20 MG/ML
5 INJECTION INTRAMUSCULAR; INTRAVENOUS
Status: DISCONTINUED | OUTPATIENT
Start: 2025-05-12 | End: 2025-05-12 | Stop reason: HOSPADM

## 2025-05-12 RX ORDER — SODIUM CHLORIDE 0.9 % (FLUSH) 0.9 %
3 SYRINGE (ML) INJECTION EVERY 12 HOURS SCHEDULED
Status: DISCONTINUED | OUTPATIENT
Start: 2025-05-12 | End: 2025-05-12 | Stop reason: HOSPADM

## 2025-05-12 RX ORDER — BUPIVACAINE HYDROCHLORIDE 2.5 MG/ML
INJECTION, SOLUTION EPIDURAL; INFILTRATION; INTRACAUDAL; PERINEURAL
Status: COMPLETED | OUTPATIENT
Start: 2025-05-12 | End: 2025-05-12

## 2025-05-12 RX ADMIN — Medication 200 MCG: at 09:58

## 2025-05-12 RX ADMIN — DEXAMETHASONE SODIUM PHOSPHATE 4 MG: 10 INJECTION, SOLUTION INTRAMUSCULAR; INTRAVENOUS at 09:57

## 2025-05-12 RX ADMIN — FENTANYL CITRATE 50 MCG: 50 INJECTION, SOLUTION INTRAMUSCULAR; INTRAVENOUS at 09:52

## 2025-05-12 RX ADMIN — FENTANYL CITRATE 50 MCG: 50 INJECTION, SOLUTION INTRAMUSCULAR; INTRAVENOUS at 12:18

## 2025-05-12 RX ADMIN — FENTANYL CITRATE 50 MCG: 50 INJECTION, SOLUTION INTRAMUSCULAR; INTRAVENOUS at 12:27

## 2025-05-12 RX ADMIN — Medication 100 MCG: at 10:07

## 2025-05-12 RX ADMIN — HYDROCODONE BITARTRATE AND ACETAMINOPHEN 1 TABLET: 5; 325 TABLET ORAL at 16:37

## 2025-05-12 RX ADMIN — SODIUM CHLORIDE, POTASSIUM CHLORIDE, SODIUM LACTATE AND CALCIUM CHLORIDE: 600; 310; 30; 20 INJECTION, SOLUTION INTRAVENOUS at 11:10

## 2025-05-12 RX ADMIN — HYDROMORPHONE HYDROCHLORIDE 0.5 MG: 1 INJECTION, SOLUTION INTRAMUSCULAR; INTRAVENOUS; SUBCUTANEOUS at 11:45

## 2025-05-12 RX ADMIN — FENTANYL CITRATE 50 MCG: 50 INJECTION, SOLUTION INTRAMUSCULAR; INTRAVENOUS at 12:34

## 2025-05-12 RX ADMIN — FAMOTIDINE 20 MG: 20 TABLET, FILM COATED ORAL at 09:00

## 2025-05-12 RX ADMIN — ROCURONIUM BROMIDE 50 MG: 10 INJECTION INTRAVENOUS at 09:55

## 2025-05-12 RX ADMIN — PROPOFOL INJECTABLE EMULSION 200 MG: 10 INJECTION, EMULSION INTRAVENOUS at 09:55

## 2025-05-12 RX ADMIN — SODIUM CHLORIDE 2000 MG: 900 INJECTION INTRAVENOUS at 09:59

## 2025-05-12 RX ADMIN — HYDROMORPHONE HYDROCHLORIDE 0.5 MG: 1 INJECTION, SOLUTION INTRAMUSCULAR; INTRAVENOUS; SUBCUTANEOUS at 11:35

## 2025-05-12 RX ADMIN — HYDROMORPHONE HYDROCHLORIDE 0.5 MG: 1 INJECTION, SOLUTION INTRAMUSCULAR; INTRAVENOUS; SUBCUTANEOUS at 12:10

## 2025-05-12 RX ADMIN — HYDROCODONE BITARTRATE AND ACETAMINOPHEN 1 TABLET: 7.5; 325 TABLET ORAL at 11:35

## 2025-05-12 RX ADMIN — LIDOCAINE HYDROCHLORIDE 50 MG: 10 INJECTION, SOLUTION EPIDURAL; INFILTRATION; INTRACAUDAL; PERINEURAL at 09:55

## 2025-05-12 RX ADMIN — DROPERIDOL 0.62 MG: 2.5 INJECTION, SOLUTION INTRAMUSCULAR; INTRAVENOUS at 16:23

## 2025-05-12 RX ADMIN — LIDOCAINE HYDROCHLORIDE 0.5 ML: 10 INJECTION, SOLUTION EPIDURAL; INFILTRATION; INTRACAUDAL; PERINEURAL at 08:59

## 2025-05-12 RX ADMIN — SUGAMMADEX 200 MG: 100 INJECTION, SOLUTION INTRAVENOUS at 11:15

## 2025-05-12 RX ADMIN — HYDROMORPHONE HYDROCHLORIDE 0.5 MG: 1 INJECTION, SOLUTION INTRAMUSCULAR; INTRAVENOUS; SUBCUTANEOUS at 12:00

## 2025-05-12 RX ADMIN — SODIUM CHLORIDE, POTASSIUM CHLORIDE, SODIUM LACTATE AND CALCIUM CHLORIDE 9 ML/HR: 600; 310; 30; 20 INJECTION, SOLUTION INTRAVENOUS at 08:59

## 2025-05-12 RX ADMIN — Medication 5 MG: at 11:19

## 2025-05-12 RX ADMIN — FENTANYL CITRATE 50 MCG: 50 INJECTION, SOLUTION INTRAMUSCULAR; INTRAVENOUS at 11:15

## 2025-05-12 RX ADMIN — ROCURONIUM BROMIDE 10 MG: 10 INJECTION INTRAVENOUS at 11:02

## 2025-05-12 RX ADMIN — ONDANSETRON 4 MG: 2 INJECTION INTRAMUSCULAR; INTRAVENOUS at 11:10

## 2025-05-12 RX ADMIN — MUPIROCIN 1 APPLICATION: 20 OINTMENT TOPICAL at 09:00

## 2025-05-12 RX ADMIN — BUPIVACAINE HYDROCHLORIDE 50 ML: 2.5 INJECTION, SOLUTION EPIDURAL; INFILTRATION; INTRACAUDAL; PERINEURAL at 09:57

## 2025-05-12 RX ADMIN — PROPOFOL INJECTABLE EMULSION 30 MG: 10 INJECTION, EMULSION INTRAVENOUS at 10:27

## 2025-05-12 RX ADMIN — DEXAMETHASONE SODIUM PHOSPHATE 6 MG: 10 INJECTION, SOLUTION INTRAMUSCULAR; INTRAVENOUS at 09:59

## 2025-05-12 RX ADMIN — PROPOFOL INJECTABLE EMULSION 20 MG: 10 INJECTION, EMULSION INTRAVENOUS at 11:11

## 2025-05-12 NOTE — ANESTHESIA PREPROCEDURE EVALUATION
Anesthesia Evaluation     Patient summary reviewed and Nursing notes reviewed                Airway   Mallampati: II  TM distance: >3 FB  Neck ROM: full  No difficulty expected  Dental - normal exam     Pulmonary - normal exam   (+) a smoker (4/24) Former,  Cardiovascular - normal exam    (+) dysrhythmias (eliquis tx; last dose = 3 days ago) Paroxysmal Atrial Fib, hyperlipidemia    ROS comment:   Echo 10/19: normal EF, no significant valve disease     Neuro/Psych- negative ROS  GI/Hepatic/Renal/Endo - negative ROS     Musculoskeletal (-) negative ROS    Abdominal  - normal exam    Bowel sounds: normal.   Substance History      OB/GYN negative ob/gyn ROS         Other                      Anesthesia Plan    ASA 3     general with block     intravenous induction     Anesthetic plan, risks, benefits, and alternatives have been provided, discussed and informed consent has been obtained with: patient.    Plan discussed with CRNA.    CODE STATUS:

## 2025-05-12 NOTE — H&P
Pre-Op H&P  Mar Joseph  [id number]  1950      Chief complaint: Right groin discomfort      Subjective:  Patient is a 74 y.o.female presents for scheduled surgery by Dr. Corraels. She anticipates a ROBOTIC ASSISTED LAPAROSCOPIC RIGHT INGUINAL HERNIA REPAIR WITH MESH, POSSIBLE BILATERAL today.  She has right inguinal hernia.  She has had right groin discomfort for several months.  She had CT scan last year that showed moderate right inguinal hernia containing small bowel and possible small fat-containing left inguinal hernia.      Review of Systems:  Constitutional-- No fever, chills or sweats. No fatigue.  CV-- No chest pain, palpitation or syncope. +afib  +cardiac clearance  Resp-- No SOB, cough, hemoptysis  Skin--No rashes or lesions      Allergies:   Allergies   Allergen Reactions    Ibuprofen GI Bleeding     Patient does not take due to being on blood thinners    Moxifloxacin Unknown (See Comments)    Penicillins Hives     Beta lactam allergy details  Antibiotic reaction: hives  Age at reaction: child  Dose to reaction time: unknown  Reason for antibiotic: unknown  Epinephrine required for reaction?: unknown  Tolerated antibiotics: unknown       Sulfa Antibiotics Nausea Only     nausea         Home Meds:  Medications Prior to Admission   Medication Sig Dispense Refill Last Dose/Taking    acetaminophen (Tylenol 8 Hour) 650 MG 8 hr tablet Take 1 tablet by mouth Every 6 (Six) Hours As Needed for Mild Pain.   5/11/2025    Benzocaine-Menthol (Cepacol) 15-2.3 MG lozenge Dissolve 1 each in the mouth Every 4 (Four) Hours As Needed (sore throat). 100 lozenge 0 Past Month    clindamycin (Cleocin) 150 MG capsule Patient is to take 4 caps 1 hour prior to dental procedure/cleaning 4 capsule 1 Past Month    FLUoxetine (PROzac) 40 MG capsule Take 1 capsule by mouth Daily. 90 capsule 3 5/11/2025    gabapentin (NEURONTIN) 400 MG capsule Take  by mouth 2 (Two) Times a Day.   5/11/2025    multivitamin with minerals  (MULTIVITAMIN ADULTS 50+ PO) Take 1 tablet by mouth 2 (Two) Times a Day.   5/11/2025    sotalol (Betapace) 80 MG tablet Take 1 tablet by mouth 2 (Two) Times a Day. 180 tablet 3 5/11/2025 at  9:00 PM    timolol (TIMOPTIC) 0.25 % ophthalmic solution 1 drop 2 (Two) Times a Day. Per eye doctor   5/12/2025 Morning    traMADol (ULTRAM) 50 MG tablet    5/11/2025    travoprost, REBEKA free, (TRAVATAN) 0.004 % solution ophthalmic solution travoprost 0.004 % eye drops   Instill by ophthalmic route.   5/11/2025    apixaban (Eliquis) 5 MG tablet tablet Take 1 tablet by mouth 2 (Two) Times a Day. 180 tablet 3 5/9/2025         PMH:   Past Medical History:   Diagnosis Date    A-fib     Abnormal ECG     Allergic [id number]    Arrhythmia     Arthritis     Arthritis of back     Back problem     Bronchitis     Cervical disc disorder     Chronic alcohol use     Chronic colitis     Fracture of hip     Fracture, femur     Glaucoma Years ago    Heart disease     Hip arthrosis     History of cardioversion     History of transesophageal echocardiography (YOLANDA)     Inflammatory bowel disease Years ago    Low back pain Years ago    Low back strain     Lumbosacral disc disease     Menopause     Mixed hyperlipidemia 10/11/2019    Palpitation     Benign palpitations    Scoliosis     Tibia/fibula fracture     Fall with right tibia/fibula fracture, status post tibia IM harish, September 2013.    Tobacco use     Visual impairment Years ago    Wears glasses      PSH:    Past Surgical History:   Procedure Laterality Date    CARDIAC ELECTROPHYSIOLOGY PROCEDURE N/A 06/26/2020    Procedure: PVA (paroxysmal), hold Sotalol 3 days, DNS Eliquis, Rhythmia (BSC) only using direct sense;  Surgeon: Isiah Garcia DO;  Location: [facility] INVASIVE LOCATION;  Service: Cardiovascular;  Laterality: N/A;    CARDIOVERSION      COLONOSCOPY  2015    HAND SURGERY      HIP SURGERY      JOINT REPLACEMENT  9-5-24    LEG SURGERY Right     states five breaks, steal harish in R leg     "TOTAL HIP ARTHROPLASTY Left 2024    Procedure: TOTAL HIP ARTHROPLASTY ANTERIOR MODIFIED LEFT;  Surgeon: Tommie Metcalf MD;  Location: [facility];  Service: Orthopedics;  Laterality: Left;    TRIGGER POINT INJECTION       Social History:   Tobacco:   Social History     Tobacco Use   Smoking Status Former    Current packs/day: 0.00    Average packs/day: 0.3 packs/day for 78.6 years (20.2 ttl pk-yrs)    Types: Cigarettes    Start date: 1972    Quit date: 2024    Years since quittin.1    Passive exposure: Never   Smokeless Tobacco Never      Alcohol:     Social History     Substance and Sexual Activity   Alcohol Use Yes    Alcohol/week: 2.0 standard drinks of alcohol    Types: 1 Glasses of wine, 1 Cans of beer per week    Comment: WEEKLY         Physical Exam:/62 (BP Location: Right arm, Patient Position: Lying)   Pulse 64   Temp 98 °F (36.7 °C) (Tympanic)   Resp 18   Ht 160 cm (63\")   Wt 52.2 kg (115 lb)   LMP  (LMP Unknown)   SpO2 95%   BMI 20.37 kg/m²       General Appearance:    Alert, cooperative, no distress, appears stated age   Head:    Normocephalic, without obvious abnormality, atraumatic   Lungs:     Clear to auscultation bilaterally, respirations unlabored    Heart:   Regular rate and rhythm, S1 and S2 normal    Abdomen:    Soft without tenderness   Extremities:   Extremities normal, atraumatic, no cyanosis or edema   Skin:   Skin color, texture, turgor normal, no rashes or lesions   Neurologic:   Grossly intact     Results Review:     LABS:  Lab Results   Component Value Date    WBC 5.07 2025    HGB 11.4 (L) 2025    HCT 33.7 (L) 2025    MCV 94.7 2025     2025    NEUTROABS 2.57 2025    GLUCOSE 99 2025    BUN 12 2025    CREATININE 0.55 (L) 2025    EGFRIFNONA 125 2021    EGFRIFAFRI 119 2020     (L) 2025    K 5.4 (H) 2025     2025    CO2 25.5 2025    MG 2.1 05/15/2024    " CALCIUM 9.7 05/08/2025    ALBUMIN 4.2 05/16/2024    AST 21 05/16/2024    ALT 23 05/16/2024    BILITOT 0.4 05/16/2024       RADIOLOGY:  Imaging Results (Last 72 Hours)       ** No results found for the last 72 hours. **            I reviewed the patient's new clinical results.    Cancer Staging (if applicable)  Cancer Patient: __ yes __no __unknown; If yes, clinical stage T:__ N:__M:__, stage group or __N/A      Impression: Unilateral or inguinal hernia without obstruction or gangrene      Plan: ROBOTIC ASSISTED LAPAROSCOPIC RIGHT INGUINAL HERNIA REPAIR WITH MESH, POSSIBLE BILATERAL       Nathalia Hutton, ALDO   5/12/2025   08:33 EDT

## 2025-05-12 NOTE — ANESTHESIA POSTPROCEDURE EVALUATION
Patient: Mar Joseph    Procedure Summary       Date: 05/12/25 Room / Location:  [facility]  [facility]    Anesthesia Start: 0949 Anesthesia Stop: 1128    Procedure: ROBOTIC ASSISTED LAPAROSCOPIC BILATERAL INGUINAL HERNIA REPAIR WITH MESH (Bilateral: Abdomen) Diagnosis:       Right inguinal hernia      (Right inguinal hernia [K40.90])    Surgeons: Alfred Corrales MD Provider: Jeff Gunderson MD    Anesthesia Type: general with block ASA Status: 3            Anesthesia Type: general with block    Vitals  Vitals Value Taken Time   /75 05/12/25 11:28   Temp     Pulse 70 05/12/25 11:28   Resp 16 05/12/25 11:28   SpO2 98 % 05/12/25 11:28           Post Anesthesia Care and Evaluation    Patient location during evaluation: PACU  Patient participation: complete - patient participated  Level of consciousness: awake and alert  Pain management: adequate    Airway patency: patent  Anesthetic complications: No anesthetic complications  PONV Status: none  Cardiovascular status: hemodynamically stable and acceptable  Respiratory status: nonlabored ventilation, acceptable, nasal cannula and spontaneous ventilation  Hydration status: acceptable  No anesthesia care post op

## 2025-05-12 NOTE — ANESTHESIA PROCEDURE NOTES
"Peripheral Block      Patient reassessed immediately prior to procedure    Patient location during procedure: OR  Start time: 5/12/2025 9:55 AM  Stop time: 5/12/2025 9:57 AM  Reason for block: at surgeon's request and post-op pain management  Performed by  CRNA/CAA: Mechelle Barth CRNA  Preanesthetic Checklist  Completed: patient identified, IV checked, site marked, risks and benefits discussed, surgical consent, monitors and equipment checked, pre-op evaluation and timeout performed  Prep:  Pt Position: supine  Sterile barriers:cap, gloves, mask and washed/disinfected hands  Prep: ChloraPrep  Patient monitoring: blood pressure monitoring, continuous pulse oximetry and EKG  Procedure    Sedation: yes  Performed under: general  Guidance:ultrasound guided  Images:still images obtained, printed/placed on chart    Laterality:Bilateral  Block Type:TAP  Injection Technique:single-shot  Needle Type:short-bevel and echogenic  Needle Gauge:20 G  Resistance on Injection: none    Medications Used: dexamethasone sodium phosphate injection - Injection   4 mg - 5/12/2025 9:57:00 AM  bupivacaine PF (MARCAINE) 0.25 % injection - Injection   50 mL - 5/12/2025 9:57:00 AM      Medications  Comment:Block Injection:  LA dose divided between Right and Left block        Post Assessment  Injection Assessment: negative aspiration for heme, incremental injection and no paresthesia on injection  Patient Tolerance:comfortable throughout block  Complications:no  Additional Notes    Mid-Axillary/Lateral TAPs    A high-frequency linear transducer, with sterile cover, was placed in the midaxillary line between the ASIS and costal margin. The External Oblique Muscle (EOM), Internal Oblique Muscle (IOM), Transverse Abdominus Muscle (NAIDU), and Peritoneum were identified. The insertion site was prepped in sterile fashion and then localized with 2-5 ml of 1% Lidocaine. Using ultrasound-guidance, a 20-gauge B-Gillespie 4\" Ultraplex 360 non-stimulating " echogenic needle was advanced in plane, from medial to lateral, until the tip of the needle was in the fascial plane between the IOM and NAIDU. 1-3ml of preservative free normal saline was used to hydro-dissect the fascial planes. After the fascial plane was verified, the local anesthetic (LA) was injected. The procedure was repeated on the opposite side for bilateral coverage. Aspiration every 5 ml to prevent intravascular injection. Injection was completed with negative aspiration of blood and negative intravascular injection. Injection pressures were normal with minimal resistance. Midaxillary TAPs should reach intercostal nerves T10- T11 and the subcostal nerve T12.    Performed by: Mechelle Barth CRNA

## 2025-05-12 NOTE — OP NOTE
Operative Report    Patient Name:  Mar Joseph  YOB: 1950  [id number]    5/12/2025      PREOPERATIVE DIAGNOSIS: Reducible bilateral inguinal Hernias       POSTOPERATIVE DIAGNOSIS: Same        PROCEDURE PERFORMED:     Robotic Assisted Laparoscopic Transabdominal Bilateral Inguinal Hernia Repair with Mesh       SURGEON: Alfred Corrales MD      ASSISTANT: RONAK Ugalde    Assistant responsibilities: They assisted with dissection, retraction, exchange of instruments and cameras, and closure.  Their assistance was necessary and required for successful completion of the case.     SPECIMENS: None       ESTIMATED BLOOD LOSS: 10 mL     ANESTHESIA: General with TAP blocks.        FINDINGS:  1. A large right indirect inguinal hernia was encountered on the right side. This was completely reduced and repaired with a Large Bard 3D Max Mid Mesh in the preperitoneal space  2. A small indirect inguinal hernia was encountered on the left side. This was completely reduced and repaired with a Large Bard 3D Max Mid Mesh in the preperitoneal space       INDICATIONS:      This patient is a 74 y.o. female who presented to my clinic with complaints of right inguinal bulge and pain. A history and physical exam was performed and found to be consistent with a reducible bilateral inguinal hernias. Pre-operative imaging including CT scan confirmed the diagnosis. The risks, benefits, and alternatives of the procedure were discussed with the patient and their family preoperatively and they agreed to proceed.          DESCRIPTION OF PROCEDURE:      After obtaining informed consent, the patient was taken to the operating room and placed in the supine position on the operating room table. General anesthesia was initiated. A Smith catheter was placed. The abdomen was prepped and draped in the usual sterile fashion. A time-out was properly performed. Antibiotics were given preoperatively. SCDs were properly placed on the  patient and turned on. A block was performed by the Anesthesia service prior to the start if the case.     Using an 11 blade scalpel, a small stab incision was made in the patient's left upper quadrant at Clemens's point.  A Veress needle was then inserted into the abdominal cavity with aspiration and water drop test reassuring.  Pneumoperitoneum was established to 15 mmHg.  Next utilizing a 5 mm 0 degree laparoscope as well as an 8 mm robotic Optiview trocar, the abdomen was entered in the midline epigastrium under direct laparoscopic visualization utilizing an Optiview technique.  The abdomen was surveyed with special attention to the viscera underlying the insertion site as well as underlying the Veress needle site which were both found to be free of injury.  The Veress needle was then removed. Next two additional 8 mm robotic trocars were placed laterally to the periumbilical trocar, one on the left and one on the right side. The abdomen was surveyed and laparoscopic exam demonstrated evidence of a inguinal hernia on the right side and large inguinal hernia on the left side.  A large Bard 3D Max Mid right and left-sided mesh was then inserted into the peritoneal cavity.  The da Israel Xi robotic system was then docked at the patient's bedside and targeted in the pelvis.    A peritoneal incision was then created using cautery on the under-surface of the abdominal wall starting at the midline and proceeding laterally towards the ASIS on the right side. The peritoneum was then retracted downwards and the rectus muscle was swept upwards to develop the preperitoneal space.  The right preperitoneal space was dissected from the ASIS laterally to the pubic tubercle medially.  There was a large indirect hernia sac which was completely reduced and  from the round ligament.  The round ligament was divided with electrocautery.  The peritoneum was delivered posteriorly so that it would lie flat..  With the dissection  complete, the large Bard 3D max right-sided mesh was delivered into the preperitoneal space. The inferior aspect of the mesh was placed inferior to Louis's ligament and covering the sites of direct, indirect, and femoral potential defects.  The mesh was secured medially to Louis's ligament and laterally to the abdominal wall using interrupted 2-0 Vicryl suture.  Rika was applied to the wound bed.  With this completed, the peritoneal flap was then elevated with great care to ensure proper apposition of the mesh between the peritoneum and rectus muscle without folding. The peritoneal flap was then closed using an absorbable 2-0 Stratafix suture.     A peritoneal incision was then created using cautery on the under-surface of the abdominal wall starting at the midline and proceeding laterally towards the ASIS on the left side. The peritoneum was then retracted downwards and the rectus muscle was swept upwards to develop the preperitoneal space.  The left preperitoneal space was dissected from the ASIS laterally to the pubic tubercle medially.  There was a small indirect hernia sac which was completely reduced.  This was  from the round ligament and the round ligament was divided with electrocautery.  The peritoneum was delivered posteriorly so that it would lie flat.. With the dissection complete, the large Bard 3D max left-sided mesh was delivered into the preperitoneal space. The inferior aspect of the mesh was placed inferior to Louis's ligament and covering the sites of direct, indirect, and femoral potential defects.  The mesh was secured medially to Louis's ligament and laterally to the abdominal wall using interrupted 2-0 Vicryl suture.  Rika was applied within the wound bed.  With this completed, the peritoneal flap was then elevated with great care to ensure proper apposition of the mesh between the peritoneum and rectus muscle without folding. The peritoneal flap was then closed using an  absorbable 2-0 V Stratafix suture.  The da [place] XI robotic system was then undocked from the patient's bedside.          The abdomen was then carefully surveyed and hemostasis confirmed. The fascia of the midline trocar site was closed under direct laparoscopic visualization utilizing a Adryan-Barrera device with an 0 Vicryl suture.  The 8 mm trocars were removed under direct laparoscopic visualization and pneumoperitoneum was released. Hemostasis of all wound sites was confirmed and each wound site was irrigated. All skin incisions were then closed with 4-0 Monocryl in the subcuticular layer. Mastisol and Steri-Strips were then applied overlying each incision site.      After the procedure, the patient was awakened, extubated, and taken to the postoperative anesthesia care unit for recovery. All needle, instrument, and lap counts were correct. I was personally present and performed all portions of the procedure. There were no immediate complications         Alfred Corrales MD  5/12/2025  11:14 EDT

## 2025-05-12 NOTE — ANESTHESIA PROCEDURE NOTES
Airway  Reason: elective    Date/Time: 5/12/2025 9:58 AM  Airway not difficult    General Information and Staff    Patient location during procedure: OR  CRNA/CAA: Jeff Gunderson MD    Indications and Patient Condition  Indications for airway management: airway protection    Preoxygenated: yes  MILS not maintained throughout    Mask difficulty assessment: 1 - vent by mask    Final Airway Details    Final airway type: endotracheal airway      Successful airway: ETT  Cuffed: yes   Successful intubation technique: video laryngoscopy  Endotracheal tube insertion site: oral  Blade: Fall  Blade size: 3  ETT size (mm): 7.0  Cormack-Lehane Classification: grade I - full view of glottis  Placement verified by: chest auscultation and capnometry   Cuff volume (mL): 7  Measured from: lips  ETT/EBT  to lips (cm): 20  Number of attempts at approach: 1  Assessment: lips, teeth, and gum same as pre-op and atraumatic intubation    Additional Comments  Negative epigastric sounds, Breath sound equal bilaterally with symmetric chest rise and fall

## 2025-05-13 ENCOUNTER — TELEPHONE (OUTPATIENT)
Dept: ORTHOPEDIC SURGERY | Facility: CLINIC | Age: 75
End: 2025-05-13
Payer: MEDICARE

## 2025-05-13 NOTE — TELEPHONE ENCOUNTER
[facility] received a fax that requires your attention. The document has been indexed to the patient’s chart for your review.      Reason for sending: RECEIVED DISCHARGE NOTE FROM [facility] THAT NEEDS TO BE SIGNED BY THE PROVIDER    Documents Description: DISCHARGE NOTE-[facility]-4/2/2025    Name of Sender: [facility]    Date Indexed: 5/13/2025

## (undated) DEVICE — SEAL

## (undated) DEVICE — SUT MNCRYL PLS ANTIB UD 4/0 PS2 18IN

## (undated) DEVICE — PULLOVER TOGA, 2X LARGE: Brand: FLYTE, SURGICOOL

## (undated) DEVICE — ENDOPATH PNEUMONEEDLE INSUFFLATION NEEDLES WITH LUER LOCK CONNECTORS 120MM: Brand: ENDOPATH

## (undated) DEVICE — INTRO SHEATH FAST/CATH LG/LUM 11F .038IN 12CM

## (undated) DEVICE — BLANKT WARM UPPR/BDY ARM/OUT 57X196CM

## (undated) DEVICE — GLV SURG PREMIERPRO MIC LTX PF SZ8.5 BRN

## (undated) DEVICE — DRAPE,TOP,102X53,STERILE: Brand: MEDLINE

## (undated) DEVICE — PROB S-CATH TEMP ESOPH 10F

## (undated) DEVICE — GLV SURG SENSICARE PI MIC PF SZ8 LF STRL

## (undated) DEVICE — INTRO SHEATH ART/FEM ENGAGE .038 6F12CM

## (undated) DEVICE — TBG ABL COOL PT 2.6M 100042049

## (undated) DEVICE — Device

## (undated) DEVICE — SNAP KOVER: Brand: UNBRANDED

## (undated) DEVICE — TRY EPID SFTY 18G 3.5IN 1T7680

## (undated) DEVICE — TRAP FLD MINIVAC MEGADYNE 100ML

## (undated) DEVICE — SOL NACL 0.9PCT 1000ML

## (undated) DEVICE — Device: Brand: MEDEX

## (undated) DEVICE — ST INF PRI SMRTSTE 20DRP 2VLV 24ML 117

## (undated) DEVICE — GW TRNSEP SAFESEPT LT/ATRIAL RO 135CM .014IN

## (undated) DEVICE — ST EXT IV SMARTSITE 2VLV SP M LL 5ML IV1

## (undated) DEVICE — GLV SURG SENSICARE PI MIC PF SZ8.5 LF STRL

## (undated) DEVICE — KT ELECTRD ENSITE PRECISION SURF

## (undated) DEVICE — SYS CLS SKIN PREMIERPRO EXOFINFUSION 22CM

## (undated) DEVICE — ABLATION CATHETER: Brand: INTELLANAV MIFI™ OPEN-IRRIGATED

## (undated) DEVICE — DRAPE,REIN 53X77,STERILE: Brand: MEDLINE

## (undated) DEVICE — C-ARM DRAPE: Brand: DEROYAL

## (undated) DEVICE — C-ARM: Brand: UNBRANDED

## (undated) DEVICE — GOWN SURG ORBIS LVL3 2XL STRL

## (undated) DEVICE — PRESSURE MONITORING SET: Brand: TRUWAVE

## (undated) DEVICE — BNDG ELAS CO-FLEX SLF ADHR 6IN 5YD LF STRL

## (undated) DEVICE — ELECTRD BLD EZ CLN STD 4IN

## (undated) DEVICE — PENCL SMOKE/EVAC MEGADYNE TELESCP 10FT

## (undated) DEVICE — PK LAP LASR CHOLE 10

## (undated) DEVICE — SET PRIMARY GRVTY 10DP MALE LL 104IN

## (undated) DEVICE — DRAPE,T,LIMB,BILATERAL,STERILE: Brand: MEDLINE

## (undated) DEVICE — LAPAROVUE VISIBILITY SYSTEM LAPAROSCOPIC SOLUTIONS: Brand: LAPAROVUE

## (undated) DEVICE — DECANT BG O JET

## (undated) DEVICE — PK HIP TOTL UNIV 10

## (undated) DEVICE — 3M™ MEDIPORE™ H SOFT CLOTH SURGICAL TAPE, 2863, 3 IN X 10 YD, 12/CASE: Brand: 3M™ MEDIPORE™

## (undated) DEVICE — SOL ANTISTICK CAUTRY ELECTROLUBE LF

## (undated) DEVICE — TOWEL,OR,DSP,ST,BLUE,STD,4/PK,20PK/CS: Brand: MEDLINE

## (undated) DEVICE — PATIENT RETURN ELECTRODE, SINGLE-USE, CONTACT QUALITY MONITORING, ADULT, WITH 9FT CORD, FOR PATIENTS WEIGING OVER 33LBS. (15KG): Brand: MEGADYNE

## (undated) DEVICE — RETR ESOPH ESOSURE W/TEMP/CONTRL NITNL STY 27F

## (undated) DEVICE — VIOLET BRAIDED (POLYGLACTIN 910), SYNTHETIC ABSORBABLE SUTURE: Brand: COATED VICRYL

## (undated) DEVICE — INTRO STEER AGILIS NXT MED/CURL 8.5F

## (undated) DEVICE — DRSNG SURESITE123 4X4.8IN

## (undated) DEVICE — ANTIBACTERIAL VIOLET BRAIDED (POLYGLACTIN 910), SYNTHETIC ABSORBABLE SUTURE: Brand: COATED VICRYL

## (undated) DEVICE — COLUMN DRAPE

## (undated) DEVICE — DIAGNOSTIC ELECTRODE CATHETER: Brand: WOVEN

## (undated) DEVICE — 1LYRTR 16FR10ML100%SIL UMS SNP: Brand: MEDLINE INDUSTRIES, INC.

## (undated) DEVICE — SYS TRNSEP ACC BRK ACROSS A/ 71CM

## (undated) DEVICE — CATH ULTRASND ECHOCARDIOGRAPHY ACUNAV

## (undated) DEVICE — STOCKINETTE,IMPERVIOUS,12X48,STERILE: Brand: MEDLINE

## (undated) DEVICE — OPEN-IRRIGATION TUBING SET: Brand: METRIQ™ IRRIGATION TUBING SET

## (undated) DEVICE — BLADELESS OBTURATOR: Brand: WECK VISTA

## (undated) DEVICE — ST EXT IV LG BORE NDLESS FLTR LL 17IN

## (undated) DEVICE — TIP COVER ACCESSORY

## (undated) DEVICE — SYRINGE,PISTON,IRRIGATION,60ML,STERILE: Brand: MEDLINE

## (undated) DEVICE — ANTIBACTERIAL UNDYED BRAIDED (POLYGLACTIN 910), SYNTHETIC ABSORBABLE SUTURE: Brand: COATED VICRYL

## (undated) DEVICE — STERILE (15.2 TAPERED TO 7.6 X 183CM) POLYETHYLENE ACCORDION-FOLDED COVER FOR USE WITH SIEMENS ACUNAV ULTRASOUND CATHETER FAMILY CONNECTOR: Brand: SWIFTLINK TRANSDUCER COVER

## (undated) DEVICE — KT MANIFLD EP

## (undated) DEVICE — TAPE,CLOTH/SILK,CURAD,3"X10YD,LF,40/CS: Brand: CURAD

## (undated) DEVICE — LEX ELECTRO PHYSIOLOGY: Brand: MEDLINE INDUSTRIES, INC.

## (undated) DEVICE — DOME MONITORING W BONDED STPCK BIOTRANS2

## (undated) DEVICE — GLOVE,SURG,SENSICARE,ALOE,LF,PF,9: Brand: MEDLINE

## (undated) DEVICE — Device: Brand: WEBSTER CS

## (undated) DEVICE — INTRO SHEATH TRNSEP .032 SL0 8.5F 63CM

## (undated) DEVICE — APPL HEMO SURG ARISTA/AH/FLEXITIP XL 38CM

## (undated) DEVICE — SYS TRNSEP ACC BRK ACROSS A/ 18G 98CM

## (undated) DEVICE — ADULT, W/LG. BACK PAD, RADIOTRANSPARENT ELEMENT AND LEAD WIRE: Brand: DEFIBRILLATION ELECTRODES

## (undated) DEVICE — SKIN PREP TRAY W/CHG: Brand: MEDLINE INDUSTRIES, INC.

## (undated) DEVICE — STRYKER PERFORMANCE SERIES SAGITTAL BLADE: Brand: STRYKER PERFORMANCE SERIES

## (undated) DEVICE — INTRO SHEATH ENGAGE W/50 GW .038 9F12

## (undated) DEVICE — SPNG GZ WOVN 4X4IN 12PLY 10/BX STRL

## (undated) DEVICE — ARM DRAPE